# Patient Record
Sex: MALE | Race: BLACK OR AFRICAN AMERICAN | NOT HISPANIC OR LATINO | URBAN - METROPOLITAN AREA
[De-identification: names, ages, dates, MRNs, and addresses within clinical notes are randomized per-mention and may not be internally consistent; named-entity substitution may affect disease eponyms.]

---

## 2024-10-29 ENCOUNTER — HOSPITAL ENCOUNTER (INPATIENT)
Facility: HOSPITAL | Age: 69
LOS: 17 days | Discharge: NURSING, REHAB OR LONG-TERM CARE | DRG: 308 | End: 2024-11-15
Attending: SPECIALIST | Admitting: SPECIALIST
Payer: MEDICARE

## 2024-10-29 DIAGNOSIS — I48.91 ATRIAL FIBRILLATION WITH RVR: Primary | ICD-10-CM

## 2024-10-29 DIAGNOSIS — N17.9 AKI (ACUTE KIDNEY INJURY): ICD-10-CM

## 2024-10-29 DIAGNOSIS — I48.91 A-FIB: ICD-10-CM

## 2024-10-29 DIAGNOSIS — I73.9 PAD (PERIPHERAL ARTERY DISEASE): ICD-10-CM

## 2024-10-29 DIAGNOSIS — I50.20 HFREF (HEART FAILURE WITH REDUCED EJECTION FRACTION): ICD-10-CM

## 2024-10-29 DIAGNOSIS — E87.5 HYPERKALEMIA: ICD-10-CM

## 2024-10-29 DIAGNOSIS — R00.1 BRADYCARDIA: ICD-10-CM

## 2024-10-29 PROBLEM — I95.9 HYPOTENSION: Status: ACTIVE | Noted: 2024-10-29

## 2024-10-29 PROBLEM — J43.1 PANLOBULAR EMPHYSEMA: Status: ACTIVE | Noted: 2024-10-29

## 2024-10-29 LAB
ANION GAP SERPL CALC-SCNC: 14 MMOL/L (ref 8–16)
BASOPHILS # BLD AUTO: 0.02 K/UL (ref 0–0.2)
BASOPHILS NFR BLD: 0.2 % (ref 0–1.9)
BUN SERPL-MCNC: 151 MG/DL (ref 8–23)
CALCIUM SERPL-MCNC: 10.5 MG/DL (ref 8.7–10.5)
CHLORIDE SERPL-SCNC: 100 MMOL/L (ref 95–110)
CO2 SERPL-SCNC: 22 MMOL/L (ref 23–29)
CREAT SERPL-MCNC: 5.6 MG/DL (ref 0.5–1.4)
DIFFERENTIAL METHOD BLD: ABNORMAL
EOSINOPHIL # BLD AUTO: 0.1 K/UL (ref 0–0.5)
EOSINOPHIL NFR BLD: 0.6 % (ref 0–8)
ERYTHROCYTE [DISTWIDTH] IN BLOOD BY AUTOMATED COUNT: 13.6 % (ref 11.5–14.5)
EST. GFR  (NO RACE VARIABLE): 10 ML/MIN/1.73 M^2
GLUCOSE SERPL-MCNC: 118 MG/DL (ref 70–110)
HCT VFR BLD AUTO: 40.8 % (ref 40–54)
HGB BLD-MCNC: 13.8 G/DL (ref 14–18)
IMM GRANULOCYTES # BLD AUTO: 0.03 K/UL (ref 0–0.04)
IMM GRANULOCYTES NFR BLD AUTO: 0.3 % (ref 0–0.5)
LACTATE SERPL-SCNC: 1 MMOL/L (ref 0.5–2.2)
LYMPHOCYTES # BLD AUTO: 0.9 K/UL (ref 1–4.8)
LYMPHOCYTES NFR BLD: 9.6 % (ref 18–48)
MAGNESIUM SERPL-MCNC: 2.2 MG/DL (ref 1.6–2.6)
MCH RBC QN AUTO: 28 PG (ref 27–31)
MCHC RBC AUTO-ENTMCNC: 33.8 G/DL (ref 32–36)
MCV RBC AUTO: 83 FL (ref 82–98)
MONOCYTES # BLD AUTO: 0.5 K/UL (ref 0.3–1)
MONOCYTES NFR BLD: 5.9 % (ref 4–15)
NEUTROPHILS # BLD AUTO: 7.4 K/UL (ref 1.8–7.7)
NEUTROPHILS NFR BLD: 83.4 % (ref 38–73)
NRBC BLD-RTO: 0 /100 WBC
PHOSPHATE SERPL-MCNC: 5.3 MG/DL (ref 2.7–4.5)
PLATELET # BLD AUTO: 173 K/UL (ref 150–450)
PMV BLD AUTO: 9.3 FL (ref 9.2–12.9)
POCT GLUCOSE: 146 MG/DL (ref 70–110)
POTASSIUM SERPL-SCNC: 6 MMOL/L (ref 3.5–5.1)
RBC # BLD AUTO: 4.92 M/UL (ref 4.6–6.2)
SODIUM SERPL-SCNC: 136 MMOL/L (ref 136–145)
TROPONIN I SERPL DL<=0.01 NG/ML-MCNC: 0.04 NG/ML (ref 0–0.03)
WBC # BLD AUTO: 8.86 K/UL (ref 3.9–12.7)

## 2024-10-29 PROCEDURE — 84484 ASSAY OF TROPONIN QUANT: CPT | Performed by: NURSE PRACTITIONER

## 2024-10-29 PROCEDURE — 80048 BASIC METABOLIC PNL TOTAL CA: CPT | Performed by: NURSE PRACTITIONER

## 2024-10-29 PROCEDURE — 83605 ASSAY OF LACTIC ACID: CPT | Performed by: NURSE PRACTITIONER

## 2024-10-29 PROCEDURE — 93010 ELECTROCARDIOGRAM REPORT: CPT | Mod: ,,, | Performed by: INTERNAL MEDICINE

## 2024-10-29 PROCEDURE — 85025 COMPLETE CBC W/AUTO DIFF WBC: CPT | Performed by: NURSE PRACTITIONER

## 2024-10-29 PROCEDURE — 84100 ASSAY OF PHOSPHORUS: CPT | Performed by: NURSE PRACTITIONER

## 2024-10-29 PROCEDURE — 63600175 PHARM REV CODE 636 W HCPCS: Performed by: NURSE PRACTITIONER

## 2024-10-29 PROCEDURE — 85730 THROMBOPLASTIN TIME PARTIAL: CPT | Performed by: NURSE PRACTITIONER

## 2024-10-29 PROCEDURE — 20000000 HC ICU ROOM

## 2024-10-29 PROCEDURE — 83735 ASSAY OF MAGNESIUM: CPT | Performed by: NURSE PRACTITIONER

## 2024-10-29 PROCEDURE — 85610 PROTHROMBIN TIME: CPT | Performed by: NURSE PRACTITIONER

## 2024-10-29 PROCEDURE — 25000003 PHARM REV CODE 250: Performed by: NURSE PRACTITIONER

## 2024-10-29 PROCEDURE — 93005 ELECTROCARDIOGRAM TRACING: CPT

## 2024-10-29 RX ORDER — HEPARIN SODIUM,PORCINE/D5W 25000/250
0-40 INTRAVENOUS SOLUTION INTRAVENOUS CONTINUOUS
Status: DISCONTINUED | OUTPATIENT
Start: 2024-10-29 | End: 2024-10-31

## 2024-10-29 RX ORDER — INDOMETHACIN 25 MG/1
50 CAPSULE ORAL ONCE
Status: COMPLETED | OUTPATIENT
Start: 2024-10-29 | End: 2024-10-29

## 2024-10-29 RX ORDER — ACETAMINOPHEN 325 MG/1
650 TABLET ORAL EVERY 6 HOURS PRN
Status: DISCONTINUED | OUTPATIENT
Start: 2024-10-29 | End: 2024-11-15 | Stop reason: HOSPADM

## 2024-10-29 RX ORDER — MUPIROCIN 20 MG/G
OINTMENT TOPICAL 2 TIMES DAILY
Status: COMPLETED | OUTPATIENT
Start: 2024-10-29 | End: 2024-11-03

## 2024-10-29 RX ORDER — PHENYLEPHRINE HCL IN 0.9% NACL 20MG/250ML
0-5 PLASTIC BAG, INJECTION (ML) INTRAVENOUS CONTINUOUS
Status: DISCONTINUED | OUTPATIENT
Start: 2024-10-29 | End: 2024-10-31

## 2024-10-29 RX ORDER — BUDESONIDE 0.5 MG/2ML
0.5 INHALANT ORAL EVERY 12 HOURS
Status: DISCONTINUED | OUTPATIENT
Start: 2024-10-30 | End: 2024-11-15 | Stop reason: HOSPADM

## 2024-10-29 RX ORDER — ARFORMOTEROL TARTRATE 15 UG/2ML
15 SOLUTION RESPIRATORY (INHALATION) 2 TIMES DAILY
Status: DISCONTINUED | OUTPATIENT
Start: 2024-10-30 | End: 2024-11-15 | Stop reason: HOSPADM

## 2024-10-29 RX ORDER — ONDANSETRON HYDROCHLORIDE 2 MG/ML
8 INJECTION, SOLUTION INTRAVENOUS EVERY 6 HOURS PRN
Status: DISCONTINUED | OUTPATIENT
Start: 2024-10-29 | End: 2024-11-15 | Stop reason: HOSPADM

## 2024-10-29 RX ORDER — FAMOTIDINE 20 MG/1
20 TABLET, FILM COATED ORAL DAILY
Status: DISCONTINUED | OUTPATIENT
Start: 2024-10-30 | End: 2024-11-06

## 2024-10-29 RX ADMIN — SODIUM BICARBONATE 50 MEQ: 84 INJECTION, SOLUTION INTRAVENOUS at 11:10

## 2024-10-29 RX ADMIN — AMIODARONE HYDROCHLORIDE 150 MG: 1.5 INJECTION, SOLUTION INTRAVENOUS at 10:10

## 2024-10-29 RX ADMIN — HEPARIN SODIUM 12 UNITS/KG/HR: 10000 INJECTION, SOLUTION INTRAVENOUS at 11:10

## 2024-10-29 RX ADMIN — MUPIROCIN: 20 OINTMENT TOPICAL at 11:10

## 2024-10-29 RX ADMIN — SODIUM BICARBONATE: 84 INJECTION, SOLUTION INTRAVENOUS at 11:10

## 2024-10-29 RX ADMIN — SODIUM ZIRCONIUM CYCLOSILICATE 10 G: 5 POWDER, FOR SUSPENSION ORAL at 11:10

## 2024-10-29 RX ADMIN — DEXTROSE MONOHYDRATE 250 ML: 100 INJECTION, SOLUTION INTRAVENOUS at 11:10

## 2024-10-29 RX ADMIN — AMIODARONE HYDROCHLORIDE 1 MG/MIN: 1.8 INJECTION, SOLUTION INTRAVENOUS at 10:10

## 2024-10-29 NOTE — PROVIDER TRANSFER
(Physician in Lead of Transfers)  Outside Transfer Acceptance Note / [facility]    Upon patient arrival, please contact Critical Care Medicine on call.    Referring facility: [facility]   Referring provider: RIDGE FUENTES JR  Accepting facility: [facility]  Accepting provider: BRAEDEN ECHOLS  Admitting provider: IMELDA WRIGHT  Reason for transfer: Higher Level of Care   Transfer diagnosis: CARINA/Atrial fibrillation with RVR  Transfer specialty requested: Critical Care Medicine  Transfer specialty notified: Yes  Transfer level: NUMBER 1-5: 1  Bed type requested: ICU  Isolation status: No active isolations   Admission class or status: IP- Inpatient      Narrative     68-year-old male with a history of atrial fibrillation, coronary artery disease with stenting, hypertension, stroke with right-sided deficits, COPD, HCV, left peroneal DVT, Xarelto use, and lower extremity cellulitis presented to [facility] Emergency Department on October 29 with weakness that began on the morning of presentation.  By report he had been feeling well the day prior but did not feel well on October 29.  In the emergency department he had AFib with RVR and hypotension, but oxygenation was stable on room air.  Patient was drowsy but would wake and speak without difficulty.  He reported no chest pain or shortness of breath.  Labs were concerning for CARINA with  and creatinine 5.38.  Potassium was 7.  High sensitivity troponin was in the normal range at 40.  Chest x-ray had clear lungs but did show marked cardiomegaly.  ED attempted cardioversion with the AFib with RVR, but the patient did not cardiovert.  By report, EKGs did not show evidence of significant peak T-waves. He received calcium, dextrose/insulin, lactated Ringer's, IV metoprolol, IV bicarbonate, and Lokelma.  Levophed infusion was initiated.  Blood pressure improved with the Levophed, but tachycardia  persisted.  Patient has a PICC line in place.  He also had a Wilcox catheter placed and had a small amount of urine with catheter placement.  Case discussed with the ED doctor and with Critical Care Medicine at [facility].  ED will initiate bicarbonate infusion prior to transfer.  They will try to wean him off the Levophed if his blood pressure will tolerate.  ED felt patient was stable for transfer at present.  Will plan transfer to Critical Care Medicine at [facility] for further treatment of CARINA with hyperkalemia and atrial fibrillation with rapid ventricular response.    Lactic acid 1.9, sodium 132, potassium 7, chloride 101, CO2 21, , creatinine 5.38, glucose 120, calcium 10.5, albumin 4.5, AST 22, ALT 15, white blood cells 9, hemoglobin 14.5, hematocrit 44.8, platelets 203, digoxin level 0.1, magnesium 2.5, high sensitivity troponin 40 (reference range less than 45),   -urinalysis had 15 protein, 0-5 WBC, 0-1 RBC, 3-5 epithelial cell, moderate bacteria    Chest x-ray showed marked cardiomegaly.  Mediastinum normal.  Lungs are clear with no pleural effusion or pneumothorax.  Impression was prominent cardiomegaly with no other significant abnormality.    August 22: Sodium 141, potassium 4.4, chloride 111, CO2 25, BUN 15, creatinine 0.97, glucose 97    April 2024: RAUL showed EF 40-45%.  Moderate to severe RV dilation with reduced function.  Mild-to-moderate LA dilation intact interatrial septum.  No evidence of left atrial appendage thrombus.  Severe TR.      VS:  Temperature 97.6°, pulse 134, respirations 19, blood pressure 104/70, O2 sats 100%      Instructions    Admit to Critical Care Medicine      FABIANA Mckeon MD  [facility] Medicine Staff  Cell: [phone]

## 2024-10-30 LAB
ALBUMIN SERPL BCP-MCNC: 3.7 G/DL (ref 3.5–5.2)
ALP SERPL-CCNC: 48 U/L (ref 40–150)
ALT SERPL W/O P-5'-P-CCNC: 13 U/L (ref 10–44)
ANION GAP SERPL CALC-SCNC: 13 MMOL/L (ref 8–16)
ANION GAP SERPL CALC-SCNC: 13 MMOL/L (ref 8–16)
ANION GAP SERPL CALC-SCNC: 15 MMOL/L (ref 8–16)
AORTIC ROOT ANNULUS: 2.97 CM
APTT PPP: 111.2 SEC (ref 21–32)
APTT PPP: 29 SEC (ref 21–32)
APTT PPP: 40.6 SEC (ref 21–32)
APTT PPP: 46.7 SEC (ref 21–32)
ASCENDING AORTA: 2.85 CM
AST SERPL-CCNC: 20 U/L (ref 10–40)
AV INDEX (PROSTH): 0.88
AV MEAN GRADIENT: 3.5 MMHG
AV PEAK GRADIENT: 4.8 MMHG
AV VALVE AREA BY VELOCITY RATIO: 2.3 CM²
AV VALVE AREA: 2.8 CM²
AV VELOCITY RATIO: 0.73
BASOPHILS # BLD AUTO: 0.02 K/UL (ref 0–0.2)
BASOPHILS NFR BLD: 0.2 % (ref 0–1.9)
BILIRUB DIRECT SERPL-MCNC: 0.1 MG/DL (ref 0.1–0.3)
BILIRUB SERPL-MCNC: 0.3 MG/DL (ref 0.1–1)
BILIRUB UR QL STRIP: NEGATIVE
BSA FOR ECHO PROCEDURE: 2.01 M2
BUN SERPL-MCNC: 134 MG/DL (ref 8–23)
BUN SERPL-MCNC: 144 MG/DL (ref 8–23)
BUN SERPL-MCNC: 148 MG/DL (ref 8–23)
CALCIUM SERPL-MCNC: 9.7 MG/DL (ref 8.7–10.5)
CALCIUM SERPL-MCNC: 9.8 MG/DL (ref 8.7–10.5)
CALCIUM SERPL-MCNC: 9.9 MG/DL (ref 8.7–10.5)
CHLORIDE SERPL-SCNC: 95 MMOL/L (ref 95–110)
CHLORIDE SERPL-SCNC: 96 MMOL/L (ref 95–110)
CHLORIDE SERPL-SCNC: 97 MMOL/L (ref 95–110)
CLARITY UR: CLEAR
CO2 SERPL-SCNC: 28 MMOL/L (ref 23–29)
COLOR UR: COLORLESS
CREAT SERPL-MCNC: 4.7 MG/DL (ref 0.5–1.4)
CREAT SERPL-MCNC: 4.8 MG/DL (ref 0.5–1.4)
CREAT SERPL-MCNC: 5 MG/DL (ref 0.5–1.4)
CV ECHO LV RWT: 0.65 CM
DIFFERENTIAL METHOD BLD: ABNORMAL
DIGOXIN SERPL-MCNC: <0.1 NG/ML (ref 0.8–2)
DOP CALC AO PEAK VEL: 1.1 M/S
DOP CALC AO VTI: 14 CM
DOP CALC LVOT AREA: 3.1 CM2
DOP CALC LVOT DIAMETER: 2 CM
DOP CALC LVOT PEAK VEL: 0.8 M/S
DOP CALC LVOT STROKE VOLUME: 38.6 CM3
DOP CALC RVOT PEAK VEL: 0.67 M/S
DOP CALC RVOT VTI: 10.2 CM
DOP CALCLVOT PEAK VEL VTI: 12.3 CM
E WAVE DECELERATION TIME: 198.56 MSEC
E/A RATIO: 1.25
E/E' RATIO: 3.87 M/S
ECHO LV POSTERIOR WALL: 1.2 CM (ref 0.6–1.1)
EOSINOPHIL # BLD AUTO: 0.2 K/UL (ref 0–0.5)
EOSINOPHIL NFR BLD: 2.1 % (ref 0–8)
ERYTHROCYTE [DISTWIDTH] IN BLOOD BY AUTOMATED COUNT: 13.6 % (ref 11.5–14.5)
EST. GFR  (NO RACE VARIABLE): 12 ML/MIN/1.73 M^2
EST. GFR  (NO RACE VARIABLE): 13 ML/MIN/1.73 M^2
EST. GFR  (NO RACE VARIABLE): 13 ML/MIN/1.73 M^2
FRACTIONAL SHORTENING: 18.9 % (ref 28–44)
GLUCOSE SERPL-MCNC: 166 MG/DL (ref 70–110)
GLUCOSE SERPL-MCNC: 88 MG/DL (ref 70–110)
GLUCOSE SERPL-MCNC: 94 MG/DL (ref 70–110)
GLUCOSE UR QL STRIP: NEGATIVE
HCT VFR BLD AUTO: 40.8 % (ref 40–54)
HGB BLD-MCNC: 13.7 G/DL (ref 14–18)
HGB UR QL STRIP: ABNORMAL
IMM GRANULOCYTES # BLD AUTO: 0.02 K/UL (ref 0–0.04)
IMM GRANULOCYTES NFR BLD AUTO: 0.2 % (ref 0–0.5)
INR PPP: 1 (ref 0.8–1.2)
INTERVENTRICULAR SEPTUM: 1.2 CM (ref 0.6–1.1)
IVC DIAMETER: 2.85 CM
IVRT: 72.31 MSEC
KETONES UR QL STRIP: NEGATIVE
LA MAJOR: 5.77 CM
LA MINOR: 5.76 CM
LA WIDTH: 3.2 CM
LEFT ATRIUM SIZE: 4.26 CM
LEFT ATRIUM VOLUME INDEX: 33.6 ML/M2
LEFT ATRIUM VOLUME: 66.8 CM3
LEFT INTERNAL DIMENSION IN SYSTOLE: 3 CM (ref 2.1–4)
LEFT VENTRICLE DIASTOLIC VOLUME INDEX: 28.23 ML/M2
LEFT VENTRICLE DIASTOLIC VOLUME: 56.17 ML
LEFT VENTRICLE MASS INDEX: 74 G/M2
LEFT VENTRICLE SYSTOLIC VOLUME INDEX: 17.4 ML/M2
LEFT VENTRICLE SYSTOLIC VOLUME: 34.57 ML
LEFT VENTRICULAR INTERNAL DIMENSION IN DIASTOLE: 3.7 CM (ref 3.5–6)
LEFT VENTRICULAR MASS: 147.3 G
LEUKOCYTE ESTERASE UR QL STRIP: ABNORMAL
LV LATERAL E/E' RATIO: 4 M/S
LV SEPTAL E/E' RATIO: 3.75 M/S
LVED V (TEICH): 56.17 ML
LVES V (TEICH): 34.57 ML
LVOT MG: 1.7 MMHG
LVOT MV: 0.62 CM/S
LYMPHOCYTES # BLD AUTO: 1.3 K/UL (ref 1–4.8)
LYMPHOCYTES NFR BLD: 14.2 % (ref 18–48)
MAGNESIUM SERPL-MCNC: 2.1 MG/DL (ref 1.6–2.6)
MCH RBC QN AUTO: 27.7 PG (ref 27–31)
MCHC RBC AUTO-ENTMCNC: 33.6 G/DL (ref 32–36)
MCV RBC AUTO: 83 FL (ref 82–98)
MICROSCOPIC COMMENT: ABNORMAL
MONOCYTES # BLD AUTO: 0.7 K/UL (ref 0.3–1)
MONOCYTES NFR BLD: 7.8 % (ref 4–15)
MV PEAK A VEL: 0.48 M/S
MV PEAK E VEL: 0.6 M/S
MV STENOSIS PRESSURE HALF TIME: 57.58 MS
MV VALVE AREA P 1/2 METHOD: 3.82 CM2
NEUTROPHILS # BLD AUTO: 6.7 K/UL (ref 1.8–7.7)
NEUTROPHILS NFR BLD: 75.5 % (ref 38–73)
NITRITE UR QL STRIP: NEGATIVE
NRBC BLD-RTO: 0 /100 WBC
OSMOLALITY SERPL: 337 MOSM/KG (ref 280–300)
OSMOLALITY UR: 352 MOSM/KG (ref 50–1200)
PH UR STRIP: 7 [PH] (ref 5–8)
PHOSPHATE SERPL-MCNC: 6 MG/DL (ref 2.7–4.5)
PISA MRMAX VEL: 2.74 M/S
PISA TR MAX VEL: 3.31 M/S
PLATELET # BLD AUTO: 218 K/UL (ref 150–450)
PMV BLD AUTO: 9.9 FL (ref 9.2–12.9)
POCT GLUCOSE: 155 MG/DL (ref 70–110)
POCT GLUCOSE: 186 MG/DL (ref 70–110)
POTASSIUM SERPL-SCNC: 4.9 MMOL/L (ref 3.5–5.1)
POTASSIUM SERPL-SCNC: 5.1 MMOL/L (ref 3.5–5.1)
POTASSIUM SERPL-SCNC: 5.5 MMOL/L (ref 3.5–5.1)
PROT SERPL-MCNC: 7.1 G/DL (ref 6–8.4)
PROT UR QL STRIP: NEGATIVE
PROTHROMBIN TIME: 11.7 SEC (ref 9–12.5)
PV MEAN GRADIENT: 1 MMHG
RA MAJOR: 7.88 CM
RA PRESSURE ESTIMATED: 15 MMHG
RA WIDTH: 6.3 CM
RBC # BLD AUTO: 4.94 M/UL (ref 4.6–6.2)
RBC #/AREA URNS HPF: >100 /HPF (ref 0–4)
RIGHT VENTRICULAR END-DIASTOLIC DIMENSION: 4.42 CM
RV TB RVSP: 18 MMHG
SODIUM SERPL-SCNC: 137 MMOL/L (ref 136–145)
SODIUM SERPL-SCNC: 138 MMOL/L (ref 136–145)
SODIUM SERPL-SCNC: 138 MMOL/L (ref 136–145)
SODIUM UR-SCNC: 91 MMOL/L (ref 20–250)
SP GR UR STRIP: 1.01 (ref 1–1.03)
STJ: 3.08 CM
TDI LATERAL: 0.15 M/S
TDI SEPTAL: 0.16 M/S
TDI: 0.16 M/S
TR MAX PG: 44 MMHG
TRICUSPID ANNULAR PLANE SYSTOLIC EXCURSION: 1.66 CM
TV REST PULMONARY ARTERY PRESSURE: 59 MMHG
URN SPEC COLLECT METH UR: ABNORMAL
UROBILINOGEN UR STRIP-ACNC: NEGATIVE EU/DL
WBC # BLD AUTO: 8.87 K/UL (ref 3.9–12.7)
WBC #/AREA URNS HPF: 1 /HPF (ref 0–5)
Z-SCORE OF LEFT VENTRICULAR DIMENSION IN END DIASTOLE: -4.45
Z-SCORE OF LEFT VENTRICULAR DIMENSION IN END SYSTOLE: -1.32

## 2024-10-30 PROCEDURE — 80076 HEPATIC FUNCTION PANEL: CPT | Performed by: NURSE PRACTITIONER

## 2024-10-30 PROCEDURE — 25000242 PHARM REV CODE 250 ALT 637 W/ HCPCS: Performed by: NURSE PRACTITIONER

## 2024-10-30 PROCEDURE — 80162 ASSAY OF DIGOXIN TOTAL: CPT | Performed by: NURSE PRACTITIONER

## 2024-10-30 PROCEDURE — 85025 COMPLETE CBC W/AUTO DIFF WBC: CPT | Performed by: NURSE PRACTITIONER

## 2024-10-30 PROCEDURE — 87040 BLOOD CULTURE FOR BACTERIA: CPT | Performed by: NURSE PRACTITIONER

## 2024-10-30 PROCEDURE — 94640 AIRWAY INHALATION TREATMENT: CPT

## 2024-10-30 PROCEDURE — 25000003 PHARM REV CODE 250: Performed by: NURSE PRACTITIONER

## 2024-10-30 PROCEDURE — 94761 N-INVAS EAR/PLS OXIMETRY MLT: CPT

## 2024-10-30 PROCEDURE — 83935 ASSAY OF URINE OSMOLALITY: CPT | Performed by: NURSE PRACTITIONER

## 2024-10-30 PROCEDURE — 63600175 PHARM REV CODE 636 W HCPCS: Performed by: NURSE PRACTITIONER

## 2024-10-30 PROCEDURE — 84100 ASSAY OF PHOSPHORUS: CPT | Performed by: NURSE PRACTITIONER

## 2024-10-30 PROCEDURE — 84300 ASSAY OF URINE SODIUM: CPT | Performed by: NURSE PRACTITIONER

## 2024-10-30 PROCEDURE — 99232 SBSQ HOSP IP/OBS MODERATE 35: CPT | Mod: 25,,, | Performed by: INTERNAL MEDICINE

## 2024-10-30 PROCEDURE — 83930 ASSAY OF BLOOD OSMOLALITY: CPT | Performed by: NURSE PRACTITIONER

## 2024-10-30 PROCEDURE — 80048 BASIC METABOLIC PNL TOTAL CA: CPT | Performed by: NURSE PRACTITIONER

## 2024-10-30 PROCEDURE — 83735 ASSAY OF MAGNESIUM: CPT | Performed by: NURSE PRACTITIONER

## 2024-10-30 PROCEDURE — 85730 THROMBOPLASTIN TIME PARTIAL: CPT | Mod: 91 | Performed by: SPECIALIST

## 2024-10-30 PROCEDURE — 36415 COLL VENOUS BLD VENIPUNCTURE: CPT | Performed by: NURSE PRACTITIONER

## 2024-10-30 PROCEDURE — 81000 URINALYSIS NONAUTO W/SCOPE: CPT | Performed by: NURSE PRACTITIONER

## 2024-10-30 PROCEDURE — 51702 INSERT TEMP BLADDER CATH: CPT

## 2024-10-30 PROCEDURE — 20000000 HC ICU ROOM

## 2024-10-30 RX ORDER — SODIUM CHLORIDE, SODIUM LACTATE, POTASSIUM CHLORIDE, CALCIUM CHLORIDE 600; 310; 30; 20 MG/100ML; MG/100ML; MG/100ML; MG/100ML
INJECTION, SOLUTION INTRAVENOUS CONTINUOUS
Status: DISCONTINUED | OUTPATIENT
Start: 2024-10-30 | End: 2024-11-01

## 2024-10-30 RX ORDER — SODIUM CHLORIDE, SODIUM LACTATE, POTASSIUM CHLORIDE, CALCIUM CHLORIDE 600; 310; 30; 20 MG/100ML; MG/100ML; MG/100ML; MG/100ML
INJECTION, SOLUTION INTRAVENOUS CONTINUOUS
Status: DISCONTINUED | OUTPATIENT
Start: 2024-10-30 | End: 2024-10-30

## 2024-10-30 RX ADMIN — MUPIROCIN: 20 OINTMENT TOPICAL at 08:10

## 2024-10-30 RX ADMIN — MUPIROCIN: 20 OINTMENT TOPICAL at 09:10

## 2024-10-30 RX ADMIN — BUDESONIDE INHALATION 0.5 MG: 0.5 SUSPENSION RESPIRATORY (INHALATION) at 07:10

## 2024-10-30 RX ADMIN — AMIODARONE HYDROCHLORIDE 0.5 MG/MIN: 1.8 INJECTION, SOLUTION INTRAVENOUS at 03:10

## 2024-10-30 RX ADMIN — SODIUM CHLORIDE, POTASSIUM CHLORIDE, SODIUM LACTATE AND CALCIUM CHLORIDE 1000 ML: 600; 310; 30; 20 INJECTION, SOLUTION INTRAVENOUS at 07:10

## 2024-10-30 RX ADMIN — Medication 0.5 MCG/KG/MIN: at 01:10

## 2024-10-30 RX ADMIN — FAMOTIDINE 20 MG: 20 TABLET ORAL at 08:10

## 2024-10-30 RX ADMIN — SODIUM CHLORIDE, POTASSIUM CHLORIDE, SODIUM LACTATE AND CALCIUM CHLORIDE 500 ML: 600; 310; 30; 20 INJECTION, SOLUTION INTRAVENOUS at 11:10

## 2024-10-30 RX ADMIN — SODIUM CHLORIDE, POTASSIUM CHLORIDE, SODIUM LACTATE AND CALCIUM CHLORIDE: 600; 310; 30; 20 INJECTION, SOLUTION INTRAVENOUS at 09:10

## 2024-10-30 RX ADMIN — SODIUM ZIRCONIUM CYCLOSILICATE 10 G: 5 POWDER, FOR SUSPENSION ORAL at 08:10

## 2024-10-30 RX ADMIN — HUMAN INSULIN 8 UNITS: 100 INJECTION, SOLUTION SUBCUTANEOUS at 12:10

## 2024-10-30 RX ADMIN — ARFORMOTEROL TARTRATE 15 MCG: 15 SOLUTION RESPIRATORY (INHALATION) at 07:10

## 2024-10-30 RX ADMIN — SODIUM CHLORIDE, POTASSIUM CHLORIDE, SODIUM LACTATE AND CALCIUM CHLORIDE 125 ML/HR: 600; 310; 30; 20 INJECTION, SOLUTION INTRAVENOUS at 01:10

## 2024-10-30 RX ADMIN — SODIUM CHLORIDE, POTASSIUM CHLORIDE, SODIUM LACTATE AND CALCIUM CHLORIDE 500 ML: 600; 310; 30; 20 INJECTION, SOLUTION INTRAVENOUS at 06:10

## 2024-10-30 RX ADMIN — Medication 0.5 MCG/KG/MIN: at 11:10

## 2024-10-30 RX ADMIN — SODIUM CHLORIDE, POTASSIUM CHLORIDE, SODIUM LACTATE AND CALCIUM CHLORIDE: 600; 310; 30; 20 INJECTION, SOLUTION INTRAVENOUS at 11:10

## 2024-10-30 RX ADMIN — SODIUM CHLORIDE, POTASSIUM CHLORIDE, SODIUM LACTATE AND CALCIUM CHLORIDE: 600; 310; 30; 20 INJECTION, SOLUTION INTRAVENOUS at 03:10

## 2024-10-30 RX ADMIN — SODIUM ZIRCONIUM CYCLOSILICATE 10 G: 5 POWDER, FOR SUSPENSION ORAL at 03:10

## 2024-10-30 RX ADMIN — SODIUM ZIRCONIUM CYCLOSILICATE 10 G: 5 POWDER, FOR SUSPENSION ORAL at 09:10

## 2024-10-30 RX ADMIN — ACETAMINOPHEN 650 MG: 325 TABLET ORAL at 12:10

## 2024-10-30 NOTE — CONSULTS
[facility] - Intensive Care ([facility])  Wound Care    Patient Name:  Layton Abrams   MRN:  [id number]  Date: 10/30/2024  Diagnosis: Atrial fibrillation with RVR    History:     Past Medical History:   Diagnosis Date    Arthritis     CHF (congestive heart failure)     COPD (chronic obstructive pulmonary disease)     Hypertension     Liver disease     Stroke        Social History     Socioeconomic History    Marital status: Unknown   Tobacco Use    Smoking status: Former     Current packs/day: 0.50     Types: Cigarettes    Smokeless tobacco: Never    Tobacco comments:     Quit 2010   Substance and Sexual Activity    Alcohol use: Not Currently    Drug use: Not Currently     Social Drivers of Health     Financial Resource Strain: Low Risk  (10/29/2024)    Overall Financial Resource Strain (CARDIA)     Difficulty of Paying Living Expenses: Not very hard   Food Insecurity: No Food Insecurity (10/29/2024)    Hunger Vital Sign     Worried About Running Out of Food in the Last Year: Never true     Ran Out of Food in the Last Year: Never true   Transportation Needs: No Transportation Needs (10/29/2024)    TRANSPORTATION NEEDS     Transportation : No   Stress: No Stress Concern Present (10/29/2024)    Citizen of [place] [employer] - Occupational Stress Questionnaire     Feeling of Stress : Only a little   Housing Stability: Low Risk  (10/29/2024)    Housing Stability Vital Sign     Unable to Pay for Housing in the Last Year: No     Homeless in the Last Year: No       Precautions:     Allergies as of 10/29/2024    (No Known Allergies)       WO Assessment Details/Treatment     Consulted on Layton Abrams due to Present on admission altered skin integrity to Left heel, lumbar spine, and bilateral groin. Patient admitted on 10/29/2024 due to a-fib with RVR, PMH significant for HTN; CAD; HF; COPD; atrial fib plus DVT earlier this year on xarelto; HCV; prior CVA x 5   Skin assessed.   DTI noted to Left medial heel measuring  4x7 maroon/purple dry intact skin. Ulceration noted to left lateral heel measuring 1x0.3x0.3 wound bed is moist pink/red.Scant amount of serosanguinous drainage noted, skin around wound boggy upon palpation. Both areas cleansed with saline, patted dry painted with cavilon. Foam heel dressing applied to cover both areas. Right heel noted to have blanchable redness, heel foam replaced.   Left lateral aspect of lower leg noted to have an ulceration measuring 2x1x0.1 that may have been venous in origin, unsure of exact etiology. Wound bed pink/red moist with small amount of yellow adipose tissue visible. Scant amount of serosanguinous drainage noted. Cleansed with saline, patted dry periwound painted with cavilon and foam dressing applied.   Lumbar spine noted to have abscess measuring 3x3x0.1, appears as area of discolored maroon purple intact skin with small opening in the middle containing a small amount of serosanguineous drainage when probed. Skin around appears to have some redness but no warmth noted at this time. Cleansed with saline, patted dry painted periwound with cavilon and applied foam dressing. Recommend changing all wounds with foam dressings twice weekly on Tue/Fri and PRN for excess drainage. Recommend primary nurse notify physician if abscess on the lumbar spine appears more inflamed with excess drainage or develops warmth upon palpation.   Sacrum noted to have pink healed scar tissue but no current open wounds at this time.   Moisture associated breakdown noted to the bilateral groin. Partial thickness tissue loss that is pink/red and moist, left side is malodorous. Cleansed with bath wipe. Vashe wound cleanser compress applied to left side and allowed to dwell. Patted dry and applied interdry strip. Cleanse area daily with baths and PRN for incontinence and replace interdry strip. Recommend replacing with new strip every 5 days or PRN if soiled with urine, stool, or drainage.       Recommend  Pressure injury prevention interventions to include: turn q2 hours, float heels, moisture management. Wound care per orders.    Will f/u with patient later this week.                  10/30/24 1040   WOCN Assessment   WOCN Total Time (mins) 60   Visit Date 10/30/24   Visit Time 1040   Consult Type New   WOCN Speciality Wound   Wound deep tissue injury;moisture;At risk for pressure Injury   Number of Wounds 5   Intervention assessed;changed;applied;chart review;orders   Teaching on-going   Pressure Injury Prevention    Check Moisture Management Pad Done   Sacral Foam Dressing Peel back sacral foam dressing, assess skin and reapply   Heel protection technique Heel boot   Heel preventative measures Peel back dressing/boot, assess skin and reapply   Check Medical Devices Done        Wound 10/29/24 2204 Ulceration Left lateral Heel   Date First Assessed/Time First Assessed: 10/29/24 2204   Present on Original Admission: Yes  Primary Wound Type: Ulceration  Side: Left  Orientation: lateral  Location: Heel  Is this injury device related?: No   Wound Image    Dressing Appearance Moist drainage   Drainage Amount Scant   Drainage Characteristics/Odor Serosanguineous   Appearance Pink;Red;White;Yellow;Moist   Tissue loss description Full thickness   Red (%), Wound Tissue Color 60 %   Yellow (%), Wound Tissue Color 40 %   Periwound Area Dry;Redness  (boggy)   Wound Edges Callused   Wound Length (cm) 1 cm   Wound Width (cm) 0.3 cm   Wound Depth (cm) 0.3 cm   Wound Volume (cm^3) 0.09 cm^3   Wound Surface Area (cm^2) 0.3 cm^2   Care Cleansed with:;Wound cleanser   Dressing Applied;Foam   Periwound Care Skin barrier film applied   Dressing Change Due 11/01/24        Wound 10/29/24 2206 Moisture associated dermatitis Right anterior Groin   Date First Assessed/Time First Assessed: 10/29/24 2206   Present on Original Admission: Yes  Primary Wound Type: Moisture associated dermatitis  Side: (c) Right  Orientation: anterior  Location:  Groin  Is this injury device related?: No   Wound Image    Dressing Appearance Moist drainage   Drainage Amount Scant   Drainage Characteristics/Odor Malodorous;Serosanguineous   Appearance Pink;Red;Moist   Tissue loss description Partial thickness   Periwound Area Moist;Redness   Care Cleansed with:;Wound cleanser   Dressing Applied;Other (comment)  (interdy)   Dressing Change Due 10/31/24        Wound 10/29/24 2208 Moisture associated dermatitis Left anterior Groin   Date First Assessed/Time First Assessed: 10/29/24 2208   Present on Original Admission: Yes  Primary Wound Type: Moisture associated dermatitis  Side: Left  Orientation: anterior  Location: Groin  Is this injury device related?: No   Dressing Appearance Open to air   Drainage Amount None   Appearance Pink;Red;Moist   Tissue loss description Partial thickness   Periwound Area Moist   Care Cleansed with:;Wound cleanser        Wound 10/30/24 1000 Pressure Injury Left medial Heel   Date First Assessed/Time First Assessed: 10/30/24 1000   Present on Original Admission: Yes  Primary Wound Type: Pressure Injury  Side: Left  Orientation: medial  Location: Heel   Wound Image    Pressure Injury Stage DTPI   Dressing Appearance Open to air   Drainage Amount None   Appearance Maroon;Purple;Dry   Periwound Area Dry   Wound Length (cm) 4 cm   Wound Width (cm) 7 cm   Wound Surface Area (cm^2) 28 cm^2   Care Cleansed with:;Wound cleanser;Applied:;Skin Barrier   Dressing Applied;Foam   Dressing Change Due 11/01/24        Wound 10/30/24 1000 Abscess Lumbar spine   Date First Assessed/Time First Assessed: 10/30/24 1000   Present on Original Admission: Yes  Primary Wound Type: Abscess  Location: Lumbar spine   Wound Image    Dressing Appearance Moist drainage   Drainage Amount Small   Drainage Characteristics/Odor Serosanguineous   Appearance Red;Maroon;Black   Periwound Area Dry;Redness   Wound Length (cm) 3 cm   Wound Width (cm) 3 cm   Wound Depth (cm) 0.1 cm   Wound  Volume (cm^3) 0.9 cm^3   Wound Surface Area (cm^2) 9 cm^2   Care Cleansed with:;Wound cleanser;Applied:;Skin Barrier   Dressing Applied;Foam   Dressing Change Due 11/01/24        Wound 10/30/24 1000 Ulceration Left lateral;lower Leg   Date First Assessed/Time First Assessed: 10/30/24 1000   Present on Original Admission: Yes  Primary Wound Type: Ulceration  Side: Left  Orientation: lateral;lower  Location: Leg   Wound Image    Dressing Appearance Moist drainage   Drainage Amount Small   Drainage Characteristics/Odor Serosanguineous   Appearance Pink;Red;Yellow;Moist   Tissue loss description Full thickness   Red (%), Wound Tissue Color 90 %   Yellow (%), Wound Tissue Color 10 %   Periwound Area Dry   Wound Edges Callused   Wound Length (cm) 2 cm   Wound Width (cm) 1 cm   Wound Depth (cm) 0.1 cm   Wound Volume (cm^3) 0.2 cm^3   Wound Surface Area (cm^2) 2 cm^2   Care Cleansed with:;Wound cleanser;Applied:;Skin Barrier   Dressing Applied;Foam   Dressing Change Due 11/01/24       10/30/2024

## 2024-10-30 NOTE — ASSESSMENT & PLAN NOTE
Patient has long standing persistent (>12 months) atrial fibrillation. Patient is currently in atrial fibrillation. [id number] Score: 1. The patients heart rate in the last 24 hours is as follows:  Pulse  Min: 59  Max: 145     Antiarrhythmics  amiodarone 360 mg/200 mL (1.8 mg/mL) infusion, Continuous, Intravenous    Anticoagulants  heparin 25,000 units in dextrose 5% 250 mL (100 units/mL) infusion LOW INTENSITY nomogram - [facility], Continuous, Intravenous  heparin 25,000 units in dextrose 5% (100 units/ml) IV bolus from bag LOW INTENSITY nomogram - [facility], As needed (PRN), Intravenous  heparin 25,000 units in dextrose 5% (100 units/ml) IV bolus from bag LOW INTENSITY nomogram - [facility], As needed (PRN), Intravenous    Plan  - Replete lytes with a goal of K>4, Mg >2  - Patient is anticoagulated, home xarelto. Awaiting follow up labs to resume xarelto vs heparin infusion  - Patient's afib is currently uncontrolled. Will add amiodarone infusion - Continue  - hold home metoprolol given hypotension  - Cards consulted  - Heparin gtt for AC  - Cardiac monitoring

## 2024-10-30 NOTE — HPI
"68 year old male with known medical issues including HTN; CAD; HFrEF (4/2024 30-40%EF with ungraded diastolic dysfunction and "giant" rt heart; COPD; atrial fib plus DVT earlier this year on xarelto; HCV; prior CVA x 5    Presented to OSH ED on 10/29 with one day history of weakness  Eval revealed atrial fib with RVR; hypotension; CARINA with BUN/Cr 132/5.38 (baseline 0.97) and potassium 7  Treatment included attempted cardioversion (unsuccessful); calcium, dextrose/insulin, lactated Ringer's, IV metoprolol, IV bicarbonate, and Lokelma. Levophed infusion was initiated. PICC line and bustillo catheter were placed    [facility] was contacted for transfer for CARINA/hyperkalemia with potential need nephrology services along with atrial fib with RVR and hypotension    Seen and examined on arrival to [facility] ICU  AAOx3. Atrial fib on monitor with ventricular rate 130-140. Marginal BP with MAP 68  Complains only of intermittent severe RLE "cramping"  STAT follow up labs along with bicarb and amiodarone infusions ordered  "

## 2024-10-30 NOTE — PLAN OF CARE
Pt AAO to self. Afib on monitor rate controlled. Weaned Bashir gtt for MAP > 65. Heparin gtt continued per nomogram. LR gtt continued 125 mL/hr. Amio gtt continued @ 0.5 / hr. Wilcox exchanged. U/A sent to lab. Blood cultures obtained. Safety precautions maintained. Bed alarm in use. Call light within reach.

## 2024-10-30 NOTE — PLAN OF CARE
Patient remains stable for duration of shift. VSS, a fib on monitor. Oriented to self, reorientation provided. RA. Voids with bustillo catheter; output of 3.1 L since admission. Altered skin integrity observed; wound care consult ordered. POC reviewed with patient, demonstrated understanding. Reinforcement needed. Currently resting in bed with safety & fall prevention measures in place. Call light in reach. Care ongoing.

## 2024-10-30 NOTE — ASSESSMENT & PLAN NOTE
CARINA is likely due to acute tubular necrosis caused by ischemia related to hypotension . Baseline creatinine is  0.97 . Most recent creatinine and eGFR are listed below.    OSH reported BUN/Cr 132/5.38 on presentation     Plan  - CARINA is  follow up result pending  - Avoid nephrotoxins and renally dose meds for GFR listed above  - Monitor urine output, serial BMP, and adjust therapy as needed  - hydrate overnight with bicarb infusion  - low threshold for nephrology consult pending follow up labs/electrolytes

## 2024-10-30 NOTE — NURSING TRANSFER
Nursing Transfer Note      10/30/2024   12:00 AM    Nurse giving handoff: LUI Martinez  Nurse receiving handoff: LUI Munoz    Reason patient is being transferred:  Higher level of care    Transfer To: [facility] ICU  Transfer From: [facility]    Transfer via stretcher    Transfer with cardiac monitoring, continuous pulse ox monitoring; bustillo catheter with 675 mL clear yellow urine (per [facility], bustillo was emptied prior to transport; obtained 1 L of urine)    Transported by [facility]    Transfer Vital Signs:  Blood Pressure: 81/62 (MAP: 67)  Heart Rate: 144  O2: 93%  Temperature: 98.3  Respirations: 20    Telemetry: Telemetry  [facility] Portable Monitor  Order for Tele Monitor? Yes    Additional Lines: Bustillo Catheter    Medicines sent: Levophed at 0.08 (d/c'd upon admission per NP); sodium bicarbonate with 1000 mL left in bag    Any special needs or follow-up needed: n/a    Patient belongings transferred with patient:  only a dark metal bracelet, currently wearing    Chart send with patient: Yes    Notified: father    Patient reassessed at: 10/29 2200 (date, time)    Upon arrival to floor: cardiac monitor applied, patient oriented to room, call bell in reach, bed in lowest position, labs drawn and medications given

## 2024-10-30 NOTE — HPI
"Presented to OSH ED on 10/29 with one day history of weakness  Eval revealed atrial fib with RVR; hypotension; CARINA with BUN/Cr 132/5.38 (baseline 0.97) and potassium 7  Treatment included attempted cardioversion (unsuccessful); calcium, dextrose/insulin, lactated Ringer's, IV metoprolol, IV bicarbonate, and Lokelma. Levophed infusion was initiated. PICC line and bustillo catheter were placed     [facility] was contacted for transfer for CARINA/hyperkalemia with potential need nephrology services along with atrial fib with RVR and hypotension     Seen and examined on arrival to [facility] ICU  AAOx3. Atrial fib on monitor with ventricular rate 130-140. Marginal BP with MAP 68  Complains only of intermittent severe RLE "cramping"  STAT follow up labs along with bicarb and amiodarone infusions ordered     Cardiology consult for afib with RVR.  PMH chronic afib, HTN; CAD; HFrEF (4/2024 30-40%EF with ungraded diastolic dysfunction and "giant" rt heart; COPD; h/o DVT earlier this year on xarelto; HCV; prior CVA x 5. Prior cardiologist Dr. Cowan.  Admitted for ARF low BP and afib with RVR. Baseline Cr 0.8 and was 5 and improved to 4.7 after IVF  Multiple ulcers and abscess identified by wound care team  Now AFIB VR controlled, BP at 90/60 with nuke gtt   No active bleeding. On heparin gtt.   EKG AFIB RBBB  "

## 2024-10-30 NOTE — ASSESSMENT & PLAN NOTE
Hyperkalemia is likely due to CARINA.The patients most recent potassium results are listed below.  OSH reported 7.0 potassium at ED presentation    Plan  - s/p treatment with lokelma, bicarb, calcium, dextrose/insulin  - Monitor for arrhythmias with EKG and/or continuous telemetry.   - Monitor potassium: follow up pending now, then serial monitoring pending response to initial therapy

## 2024-10-30 NOTE — SUBJECTIVE & OBJECTIVE
Past Medical History:   Diagnosis Date    Arthritis     CHF (congestive heart failure)     COPD (chronic obstructive pulmonary disease)     Hypertension     Liver disease     Stroke        No past surgical history on file.    Review of patient's allergies indicates:  No Known Allergies    Family History    None       Tobacco Use    Smoking status: Former     Current packs/day: 0.50     Types: Cigarettes    Smokeless tobacco: Never    Tobacco comments:     Quit 2010   Substance and Sexual Activity    Alcohol use: Not Currently    Drug use: Not Currently    Sexual activity: Not on file         Review of Systems   Constitutional:  Negative for appetite change and fever.   Respiratory:  Negative for chest tightness and shortness of breath.    Cardiovascular:  Positive for leg swelling. Negative for chest pain.   Gastrointestinal: Negative.    Genitourinary:  Negative for decreased urine volume, difficulty urinating and dysuria.   Musculoskeletal:         LE cramping  Chronic LE swelling  Left hand arthralgia   Neurological:  Positive for weakness.   Psychiatric/Behavioral:  The patient is not nervous/anxious.      Objective:     Vital Signs (Most Recent):  Temp: 98.8 °F (37.1 °C) (10/29/24 2200)  Pulse: (!) 139 (10/29/24 2200)  Resp: (!) 28 (10/29/24 2200)  BP: (!) 88/60 (10/29/24 2200)  SpO2: (!) 93 % (10/29/24 2200) Vital Signs (24h Range):  Temp:  [97.6 °F (36.4 °C)-98.8 °F (37.1 °C)] 98.8 °F (37.1 °C)  Pulse:  [114-145] 139  Resp:  [19-28] 28  SpO2:  [93 %-97 %] 93 %  BP: ()/(53-62) 88/60     Weight: 84.7 kg (186 lb 11.7 oz)  Body mass index is 27.58 kg/m².    No intake or output data in the 24 hours ending 10/29/24 2222      amiodarone in dextrose 5%  1 mg/min Intravenous Continuous 33.3 mL/hr at 10/29/24 2220 1 mg/min at 10/29/24 2220    [START ON 10/30/2024] amiodarone in dextrose 5%  0.5 mg/min Intravenous Continuous        phenylephrine  0-5 mcg/kg/min Intravenous Continuous        sodium bicarbonate 150  mEq in D5W 1,150 mL infusion   Intravenous Continuous           Physical Exam  Vitals and nursing note reviewed.   Constitutional:       General: He is awake. He is not in acute distress.     Appearance: He is normal weight. He is ill-appearing. He is not toxic-appearing.   HENT:      Head: Atraumatic.   Eyes:      Conjunctiva/sclera: Conjunctivae normal.   Neck:      Vascular: No JVD.   Cardiovascular:      Rate and Rhythm: Tachycardia present. Rhythm irregularly irregular.      Pulses:           Radial pulses are 2+ on the right side and 2+ on the left side.        Dorsalis pedis pulses are 1+ on the right side and 1+ on the left side.      Comments: Chronic appearing edema with stasis skin changed to bilat LE  Pulmonary:      Effort: Pulmonary effort is normal.      Breath sounds: Normal breath sounds. No wheezing or rales.   Abdominal:      General: Bowel sounds are normal. There is no distension.      Palpations: Abdomen is soft.      Tenderness: There is no abdominal tenderness.   Skin:     General: Skin is warm and dry.      Capillary Refill: Capillary refill takes less than 2 seconds.   Neurological:      Mental Status: He is alert and oriented to person, place, and time.      GCS: GCS eye subscore is 4. GCS verbal subscore is 5. GCS motor subscore is 6.      Cranial Nerves: Cranial nerves 2-12 are intact.   Psychiatric:         Attention and Perception: Attention normal.         Mood and Affect: Mood normal.         Speech: Speech normal.         Behavior: Behavior normal. Behavior is cooperative.         Thought Content: Thought content normal.          Vents:       Lines/Drains/Airways       Peripherally Inserted Central Catheter Line  Duration             PICC Triple Lumen 10/29/24 1000 right basilic <1 day              Drain  Duration                  Urethral Catheter 10/29/24 1000 16 Fr. <1 day              Peripheral Intravenous Line  Duration                  Peripheral IV - Single Lumen 10/29/24  2210 <1 day                    Significant Labs:    OSH labs reviewed - Lactic acid 1.9, sodium 132, potassium 7, chloride 101, CO2 21, , creatinine 5.38, glucose 120, calcium 10.5, albumin 4.5, AST 22, ALT 15, white blood cells 9, hemoglobin 14.5, hematocrit 44.8, platelets 203, digoxin level 0.1, magnesium 2.5, high sensitivity troponin 40 (reference range less than 45),   -urinalysis had 15 protein, 0-5 WBC, 0-1 RBC, 3-5 epithelial cell, moderate bacteria     New labs ordered STAT    Significant Imaging:   I have reviewed all pertinent imaging results/findings within the past 24 hours.

## 2024-10-30 NOTE — PLAN OF CARE
[facility] (Hospital)  Initial Discharge Assessment       Primary Care Provider: Nellie Cadena MD    Admission Diagnosis: CARINA (acute kidney injury) [N17.9]    Admission Date: 10/29/2024  Expected Discharge Date:     Transition of Care Barriers: None    Payor: [employer] MANAGED MEDICARE / Plan: [employer] MEDICARE DUAL DSNP / Product Type: Medicare Advantage /     Extended Emergency Contact Information  Primary Emergency Contact: Bijan Abrams  Mobile Phone: [phone]  Relation: Father  Secondary Emergency Contact: Bijan Abrams   Mobile Phone: [phone]  Relation: Brother    Discharge Plan A: Home Health       No Pharmacies Listed    Initial Assessment (most recent)       Adult Discharge Assessment - 10/30/24 1433          Discharge Assessment    Assessment Type Discharge Planning Assessment     Confirmed/corrected address, phone number and insurance Yes     Confirmed Demographics Correct on Facesheet     Source of Information patient     Communicated WIL with patient/caregiver Date not available/Unable to determine     Reason For Admission A-fib w/ RVR     People in Home parent(s)     Facility Arrived From: home - [facility]     Do you expect to return to your current living situation? Yes     Do you have help at home or someone to help you manage your care at home? Yes     Who are your caregiver(s) and their phone number(s)? father and siblings     Prior to hospitilization cognitive status: Alert/Oriented     Current cognitive status: Alert/Oriented     Walking or Climbing Stairs Difficulty yes     Walking or Climbing Stairs ambulation difficulty, requires equipment     Mobility Management rollator     Dressing/Bathing Difficulty no     Home Accessibility wheelchair accessible     Home Layout Able to live on 1st floor     Equipment Currently Used at Home walker, rolling     Readmission within 30 days? No     Patient currently being followed by outpatient case management? No     Do you currently  have service(s) that help you manage your care at home? Yes     Name and Contact number of agency unsure of agency name     Is the pt/caregiver preference to resume services with current agency Yes     Do you take prescription medications? Yes     Do you have prescription coverage? Yes     Coverage MCR     Do you have any problems affording any of your prescribed medications? No     Is the patient taking medications as prescribed? yes     Who is going to help you get home at discharge? family     How do you get to doctors appointments? car, drives self     Are you on dialysis? No     Do you take coumadin? No     Discharge Plan A Home Health     DME Needed Upon Discharge  none     Discharge Plan discussed with: Patient     Transition of Care Barriers None                   Anticipated DC dispo: home health   Prior Level of Function: independent with ADLs, ambulates using a rollator   People in home:  father, Bijan     Comments:  CM met with patient and brother, Bijan, at bedside to introduce role and discuss discharge planning. Family will be help at home and can provide transport at time of discharge. Patient states he has home health but is unsure of the agency name. Confirmed demographics, insurance, and emergency contacts. CM discharge needs depends on hospital progress. CM will continue following to assist with other needs. Discharge plan has been determined by review of patient's clinical status, future medical and therapeutic needs, and coverage/benefits for post-acute care in coordination with multidisciplinary team members.

## 2024-10-30 NOTE — H&P
"  [facility] ([facility])  Critical Care Medicine  History & Physical    Patient Name: Layton Abrams  MRN: [id number]  Admission Date: 10/29/2024  Hospital Length of Stay: 0 days  Code Status: Full Code  Attending Physician: Joe Jennings MD   Primary Care Provider: No primary care provider on file.   Principal Problem: Atrial fibrillation with RVR    Subjective:     HPI:  68 year old male with known medical issues including HTN; CAD; HFrEF (4/2024 30-40%EF with ungraded diastolic dysfunction and "giant" rt heart; COPD; atrial fib plus DVT earlier this year on xarelto; HCV; prior CVA x 5    Presented to OSH ED on 10/29 with one day history of weakness  Eval revealed atrial fib with RVR; hypotension; CARINA with BUN/Cr 132/5.38 (baseline 0.97) and potassium 7  Treatment included attempted cardioversion (unsuccessful); calcium, dextrose/insulin, lactated Ringer's, IV metoprolol, IV bicarbonate, and Lokelma. Levophed infusion was initiated. PICC line and bustillo catheter were placed    [facility] was contacted for transfer for CARINA/hyperkalemia with potential need nephrology services along with atrial fib with RVR and hypotension    Seen and examined on arrival to [facility] ICU  AAOx3. Atrial fib on monitor with ventricular rate 130-140. Marginal BP with MAP 68  Complains only of intermittent severe RLE "cramping"  STAT follow up labs along with bicarb and amiodarone infusions ordered    Hospital/ICU Course:  No notes on file     Past Medical History:   Diagnosis Date    Arthritis     CHF (congestive heart failure)     COPD (chronic obstructive pulmonary disease)     Hypertension     Liver disease     Stroke        No past surgical history on file.    Review of patient's allergies indicates:  No Known Allergies    Family History    None       Tobacco Use    Smoking status: Former     Current packs/day: 0.50     Types: Cigarettes    Smokeless tobacco: Never    Tobacco comments:     Quit 2010   Substance and " Sexual Activity    Alcohol use: Not Currently    Drug use: Not Currently    Sexual activity: Not on file         Review of Systems   Constitutional:  Negative for appetite change and fever.   Respiratory:  Negative for chest tightness and shortness of breath.    Cardiovascular:  Positive for leg swelling. Negative for chest pain.   Gastrointestinal: Negative.    Genitourinary:  Negative for decreased urine volume, difficulty urinating and dysuria.   Musculoskeletal:         LE cramping  Chronic LE swelling  Left hand arthralgia   Neurological:  Positive for weakness.   Psychiatric/Behavioral:  The patient is not nervous/anxious.      Objective:     Vital Signs (Most Recent):  Temp: 98.8 °F (37.1 °C) (10/29/24 2200)  Pulse: (!) 139 (10/29/24 2200)  Resp: (!) 28 (10/29/24 2200)  BP: (!) 88/60 (10/29/24 2200)  SpO2: (!) 93 % (10/29/24 2200) Vital Signs (24h Range):  Temp:  [97.6 °F (36.4 °C)-98.8 °F (37.1 °C)] 98.8 °F (37.1 °C)  Pulse:  [114-145] 139  Resp:  [19-28] 28  SpO2:  [93 %-97 %] 93 %  BP: ()/(53-62) 88/60     Weight: 84.7 kg (186 lb 11.7 oz)  Body mass index is 27.58 kg/m².    No intake or output data in the 24 hours ending 10/29/24 2222      amiodarone in dextrose 5%  1 mg/min Intravenous Continuous 33.3 mL/hr at 10/29/24 2220 1 mg/min at 10/29/24 2220    [START ON 10/30/2024] amiodarone in dextrose 5%  0.5 mg/min Intravenous Continuous        phenylephrine  0-5 mcg/kg/min Intravenous Continuous        sodium bicarbonate 150 mEq in D5W 1,150 mL infusion   Intravenous Continuous           Physical Exam  Vitals and nursing note reviewed.   Constitutional:       General: He is awake. He is not in acute distress.     Appearance: He is normal weight. He is ill-appearing. He is not toxic-appearing.   HENT:      Head: Atraumatic.   Eyes:      Conjunctiva/sclera: Conjunctivae normal.   Neck:      Vascular: No JVD.   Cardiovascular:      Rate and Rhythm: Tachycardia present. Rhythm irregularly irregular.       Pulses:           Radial pulses are 2+ on the right side and 2+ on the left side.        Dorsalis pedis pulses are 1+ on the right side and 1+ on the left side.      Comments: Chronic appearing edema with stasis skin changed to bilat LE  Pulmonary:      Effort: Pulmonary effort is normal.      Breath sounds: Normal breath sounds. No wheezing or rales.   Abdominal:      General: Bowel sounds are normal. There is no distension.      Palpations: Abdomen is soft.      Tenderness: There is no abdominal tenderness.   Skin:     General: Skin is warm and dry.      Capillary Refill: Capillary refill takes less than 2 seconds.   Neurological:      Mental Status: He is alert and oriented to person, place, and time.      GCS: GCS eye subscore is 4. GCS verbal subscore is 5. GCS motor subscore is 6.      Cranial Nerves: Cranial nerves 2-12 are intact.   Psychiatric:         Attention and Perception: Attention normal.         Mood and Affect: Mood normal.         Speech: Speech normal.         Behavior: Behavior normal. Behavior is cooperative.         Thought Content: Thought content normal.          Vents:       Lines/Drains/Airways       Peripherally Inserted Central Catheter Line  Duration             PICC Triple Lumen 10/29/24 1000 right basilic <1 day              Drain  Duration                  Urethral Catheter 10/29/24 1000 16 Fr. <1 day              Peripheral Intravenous Line  Duration                  Peripheral IV - Single Lumen 10/29/24 2210 <1 day                    Significant Labs:    OSH labs reviewed - Lactic acid 1.9, sodium 132, potassium 7, chloride 101, CO2 21, , creatinine 5.38, glucose 120, calcium 10.5, albumin 4.5, AST 22, ALT 15, white blood cells 9, hemoglobin 14.5, hematocrit 44.8, platelets 203, digoxin level 0.1, magnesium 2.5, high sensitivity troponin 40 (reference range less than 45),   -urinalysis had 15 protein, 0-5 WBC, 0-1 RBC, 3-5 epithelial cell, moderate bacteria     New  "labs ordered STAT    Significant Imaging:   I have reviewed all pertinent imaging results/findings within the past 24 hours.  Assessment/Plan:     Pulmonary  Panlobular emphysema  Patient's COPD is controlled currently.  Patient is currently off COPD Pathway. Continue scheduled inhalers  and monitor respiratory status closely.     Cardiac/Vascular  * Atrial fibrillation with RVR  Patient has long standing persistent (>12 months) atrial fibrillation. Patient is currently in atrial fibrillation. [id number] Score: 1. The patients heart rate in the last 24 hours is as follows:  Pulse  Min: 114  Max: 145     Antiarrhythmics  amiodarone 360 mg/200 mL (1.8 mg/mL) infusion, Continuous, Intravenous  amiodarone 360 mg/200 mL (1.8 mg/mL) infusion, Continuous, Intravenous    Anticoagulants       Plan  - Replete lytes with a goal of K>4, Mg >2  - Patient is anticoagulated, home xarelto. Awaiting follow up labs to resume xarelto vs heparin infusion  - Patient's afib is currently uncontrolled. Will add amiodarone infusion  - hold home metoprolol given hypotension    HFrEF (heart failure with reduced ejection fraction)  EMR reveals outside TTE on 4/11/2024 with EF 30-40%, indeterminant diastolic dysfunction and "giant" right atrium and ventricle  RAUL on 4/12/2024 with EF 40-50%  - currently appears dry on exam with no supplemental oxygen requirement    Plan -  -follow up echo given hypotension  -strict I/O; daily weights  -IV hydration overnight  -continuous cardiac monitoring    Hypotension  Suspect multifactorial with IVVD and atrial fib with RVR  Plan  - optimize HR control  - IV hydration overnight  - hold home metoprolol  - will use phenylephrine infusion if needed to maintain MAP > 65 (avoiding levo s/t HR exacerbation)  - check echo in am    Renal/  Hyperkalemia  Hyperkalemia is likely due to CARINA.The patients most recent potassium results are listed below.  OSH reported 7.0 potassium at ED presentation    Plan  - s/p " treatment with lokelma, bicarb, calcium, dextrose/insulin  - Monitor for arrhythmias with EKG and/or continuous telemetry.   - Monitor potassium: follow up pending now, then serial monitoring pending response to initial therapy    CARINA (acute kidney injury)  CARINA is likely due to acute tubular necrosis caused by ischemia related to hypotension . Baseline creatinine is  0.97 . Most recent creatinine and eGFR are listed below.    OSH reported BUN/Cr 132/5.38 on presentation     Plan  - CARINA is  follow up result pending  - Avoid nephrotoxins and renally dose meds for GFR listed above  - Monitor urine output, serial BMP, and adjust therapy as needed  - hydrate overnight with bicarb infusion  - low threshold for nephrology consult pending follow up labs/electrolytes        Critical Care Daily Checklist:    A: Awake: RASS Goal/Actual Goal:    Actual:     B: Spontaneous Breathing Trial Performed?     C: SAT & SBT Coordinated?  N/a                      D: Delirium: CAM-ICU     E: Early Mobility Performed? Yes   F: Feeding Goal:    Status:     Current Diet Order   Procedures    Diet Renal      AS: Analgesia/Sedation prn   T: Thromboembolic Prophylaxis Heparin infusion   H: HOB > 300 Yes   U: Stress Ulcer Prophylaxis (if needed) pepcid   G: Glucose Control monitor   B: Bowel Function     I: Indwelling Catheter (Lines & Wilcox) Necessity reviewed   D: De-escalation of Antimicrobials/Pharmacotherapies reviewed    Plan for the day/ETD As above    Code Status:  Family/Goals of Care: Full Code  Goal home on discharge     Critical Care Time: 70 minutes  Critical secondary to Patient has a condition that poses threat to life and bodily function: Acute Renal Failure  Critical care was time spent personally by me on the following activities: development of treatment plan with patient or surrogate and bedside caregivers, discussions with consultants, evaluation of patient's response to treatment, examination of patient, ordering and  performing treatments and interventions, ordering and review of laboratory studies, ordering and review of radiographic studies, pulse oximetry, re-evaluation of patient's condition. This critical care time did not overlap with that of any other provider or involve time for any procedures.     ERICA Daniels-BC  Critical Care Medicine  [facility] ([facility])

## 2024-10-30 NOTE — CONSULTS
[facility] ([facility])  Nephrology  Consult Note    Patient Name: Layton Abrams  MRN: [id number]  Admission Date: 10/29/2024  Hospital Length of Stay: 1 days  Attending Provider: Joe Jennings MD   Primary Care Physician: Nellie Cadena MD  Principal Problem:Atrial fibrillation with RVR    Inpatient consult to Nephrology  Consult performed by: Justin Maradiaga MD  Consult ordered by: Makayla Dickey NP  Reason for consult: CARINA.  Assessment/Recommendations: See consult note.        Subjective:     HPI:  68-year-old gentleman with multiple medical problems including combined heart failure with left ventricular ejection fraction 40-45% per echocardiogram in May.  He had moderate to severe right ventricular dilation with reduced function then as well and had atrial dilation.  He does have CAD.  He also has history of atrial fibrillation and history of deep venous thrombosis and has been maintained on Xarelto.  He has no history of chronic kidney disease.  Creatinine value runs 0.7-0.8 as noted in June of this year.  He does not have any old urinalyses available in system here.  In June of this year urinalyses performed at [facility] revealed trace proteinuria no other concerning findings.  Review of all labs suggest patient inquired hepatitis-B infection and cleared that spontaneously.  He is noted to have had prior positive hepatitis-C virus antibody with viral load in 2022.  He was apparently remotely treated for hepatitis-C virus infection.    He was admitted here on October 29th (transfer from [facility]) with generalized weakness.  Found to have atrial fibrillation/RVR.  On admission BUN and creatinine were markedly above baseline at 1:32 a.m. and 5.4.  He was hyperkalemic.  He failed attempted cardioversion.  He received usual therapy to address hyperkalemia.  Wilcox catheter was placed.  He has required pressor (Bashir-Synephrine) infusion and admission to ICU.  He  was loaded on amiodarone.  Has remained in atrial fibrillation but rate controlled much better.    On day of admission here patient noted to have blood pressures in the 70s and 80s intermittently.  He had excellent urine output on day of admission here and has persisted with good urine output.  This has led to resolution of hyperkalemia and creatinine is improving.  Urinalysis at [facility] revealed trace proteinuria and a moderately concentrated specimen but no significant hematuria.  Renal ultrasound performed on October 30th unremarkable except for an 8 mm right mid pole renal cyst..    Patient currently family well and has no complaints.        Past Medical History:   Diagnosis Date    Arthritis     CHF (congestive heart failure)     COPD (chronic obstructive pulmonary disease)     Hypertension     Liver disease     Stroke        No past surgical history on file.    Review of patient's allergies indicates:  No Known Allergies  Current Facility-Administered Medications   Medication Frequency    acetaminophen tablet 650 mg Q6H PRN    amiodarone 360 mg/200 mL (1.8 mg/mL) infusion Continuous    arformoteroL nebulizer solution 15 mcg BID    budesonide nebulizer solution 0.5 mg Q12H    famotidine tablet 20 mg Daily    heparin 25,000 units in dextrose 5% (100 units/ml) IV bolus from bag LOW INTENSITY nomogram - [facility] PRN    heparin 25,000 units in dextrose 5% (100 units/ml) IV bolus from bag LOW INTENSITY nomogram - [facility] PRN    heparin 25,000 units in dextrose 5% 250 mL (100 units/mL) infusion LOW INTENSITY nomogram - [facility] Continuous    influenza (adjuvanted) (Fluad) 45 mcg/0.5 mL IM vaccine (> or = 66 yo) 0.5 mL Prior to discharge    lactated ringers infusion Continuous    mupirocin 2 % ointment BID    ondansetron injection 8 mg Q6H PRN    PHENYLephrine (GILBERTO-SYNEPHRINE) 20 mg in sodium chloride 0.9% 250ml (titrating) Continuous    pneumoc 20-haylee conj-dip cr(PF) (PREVNAR-20 (PF)) injection Syrg 0.5 mL vaccine x 1  dose    sodium zirconium cyclosilicate packet 10 g TID     Family History    None       Tobacco Use    Smoking status: Former     Current packs/day: 0.50     Types: Cigarettes    Smokeless tobacco: Never    Tobacco comments:     Quit 2010   Substance and Sexual Activity    Alcohol use: Not Currently    Drug use: Not Currently    Sexual activity: Not on file     Review of Systems    Significant for weakness as above.  This was acute and likely related to his rhythm abnormality.  Weight has been stable and energy level has been generally good.  He denied any chest pain with his arrhythmia.  He had some mild dyspnea but that has resolved.  No nausea or vomiting or diarrhea.  No black or bloody stools and no problems urinating.  Review of systems is otherwise negative for all other systems on 12 point review.    Objective:     Vital Signs (Most Recent):  Temp: 97.5 °F (36.4 °C) (10/30/24 0901)  Pulse: 93 (10/30/24 0901)  Resp: 20 (10/30/24 0901)  BP: 109/76 (10/30/24 0901)  SpO2: 95 % (10/30/24 0901) Vital Signs (24h Range):  Temp:  [96.8 °F (36 °C)-98.8 °F (37.1 °C)] 97.5 °F (36.4 °C)  Pulse:  [] 93  Resp:  [10-32] 20  SpO2:  [83 %-98 %] 95 %  BP: ()/(49-78) 109/76     Weight: 83 kg (183 lb) (10/30/24 0830)  Body mass index is 27.02 kg/m².  Body surface area is 2.01 meters squared.    I/O last 3 completed shifts:  In: 2141.2 [P.O.:240; I.V.:1354; IV Piggyback:547.1]  Out: 4525 [Urine:4525]    Physical Exam  Vitals reviewed.   Constitutional:       Appearance: Normal appearance.   HENT:      Head: Normocephalic and atraumatic.      Right Ear: External ear normal.      Left Ear: External ear normal.      Nose: Nose normal.      Mouth/Throat:      Mouth: Mucous membranes are dry.      Comments: Edentulous  Eyes:      General: No scleral icterus.  Cardiovascular:      Rate and Rhythm: Normal rate. Rhythm irregular.      Heart sounds:      No friction rub. No gallop.   Pulmonary:      Effort: Pulmonary effort is  normal.      Breath sounds: Normal breath sounds.   Abdominal:      General: Bowel sounds are normal.      Palpations: Abdomen is soft.      Tenderness: There is no abdominal tenderness.   Genitourinary:     Comments: Wilcox catheter in place with urine in receptacle.  Musculoskeletal:      Cervical back: Neck supple.   Lymphadenopathy:      Cervical: No cervical adenopathy.   Skin:     General: Skin is warm and dry.   Neurological:      General: No focal deficit present.      Mental Status: He is alert and oriented to person, place, and time.   Psychiatric:         Mood and Affect: Mood normal.         Behavior: Behavior normal.         Thought Content: Thought content normal.         Judgment: Judgment normal.         Significant Labs:  All labs within the past 24 hours have been reviewed.    Significant Imaging:  X-Ray: Reviewed  US: Reviewed    Assessment/Plan:     Active Diagnoses:    Diagnosis Date Noted POA    PRINCIPAL PROBLEM:  Atrial fibrillation with RVR [I48.91] 10/29/2024 Yes    CARINA (acute kidney injury) [N17.9] 10/29/2024 Yes    Hyperkalemia [E87.5] 10/29/2024 Yes    Hypotension [I95.9] 10/29/2024 Yes    Panlobular emphysema [J43.1] 10/29/2024 Yes    HFrEF (heart failure with reduced ejection fraction) [I50.20] 10/29/2024 Yes      Problems Resolved During this Admission:       Generalized weakness.  Likely due to atrial fibrillation with RVR.  Improving.    Atrial fibrillation/RVR.  Prior known history of such.  Maintained on Xarelto in the outpatient setting.  On amiodarone.  On heparin drip.  Outpatient Xarelto on hold.    Shock.  Required Bashir-Synephrine on admission.  Wean as able.    Acute kidney injury.  No known chronic kidney disease.  Nonoliguric and improving with supportive care.  Likely suffered some ATN in setting of renal hypoperfusion.  No other obvious causes.  Improving.  Continue fluids.    Resolved hyperkalemia.  Has renal restrictions on diet.  Also has thrice daily Lokelma  ordered.    Hyperphosphatemia of acute kidney injury.  Should improve with improving renal function.  No changes.    Combined heart failure with significant systolic dysfunction and severe right heart failure.     Reported CAD.    Multiple prior CVAs.      COPD.    Reported history of liver disease.    Thank you for your consult. I will follow-up with patient. Please contact us if you have any additional questions.    Justin Maradiaga MD  Nephrology  [facility] ([facility])

## 2024-10-30 NOTE — ASSESSMENT & PLAN NOTE
"EMR reveals outside TTE on 4/11/2024 with EF 30-40%, indeterminant diastolic dysfunction and "giant" right atrium and ventricle  RAUL on 4/12/2024 with EF 40-50%  - currently appears dry on exam with no supplemental oxygen requirement    Plan -  -follow up echo given hypotension  -strict I/O; daily weights  -IV hydration overnight  -continuous cardiac monitoring  "

## 2024-10-30 NOTE — SUBJECTIVE & OBJECTIVE
Past Medical History:   Diagnosis Date    Arthritis     CHF (congestive heart failure)     COPD (chronic obstructive pulmonary disease)     Hypertension     Liver disease     Stroke        No past surgical history on file.    Review of patient's allergies indicates:  No Known Allergies    Current Facility-Administered Medications on File Prior to Encounter   Medication    [DISCONTINUED] dextrose 50 % in water (D50W) injection    [DISCONTINUED] diltiaZEM injection    [DISCONTINUED] NORepinephrine 4 mg in dextrose 5% 250 mL infusion (premix)     No current outpatient medications on file prior to encounter.     Family History    None       Tobacco Use    Smoking status: Former     Current packs/day: 0.50     Types: Cigarettes    Smokeless tobacco: Never    Tobacco comments:     Quit 2010   Substance and Sexual Activity    Alcohol use: Not Currently    Drug use: Not Currently    Sexual activity: Not on file     Review of Systems   Constitutional: Positive for malaise/fatigue. Negative for decreased appetite, diaphoresis, fever and night sweats.   HENT:  Negative for nosebleeds.    Eyes:  Negative for blurred vision and double vision.   Cardiovascular:  Positive for irregular heartbeat and palpitations. Negative for chest pain, claudication, dyspnea on exertion, leg swelling, orthopnea, paroxysmal nocturnal dyspnea and syncope.   Respiratory:  Negative for cough, shortness of breath, sleep disturbances due to breathing, snoring, sputum production and wheezing.    Endocrine: Negative for cold intolerance and polyuria.   Hematologic/Lymphatic: Does not bruise/bleed easily.   Skin:  Negative for rash.   Musculoskeletal:  Negative for back pain, falls, joint pain, joint swelling and neck pain.   Gastrointestinal:  Negative for abdominal pain, heartburn, nausea and vomiting.   Genitourinary:  Negative for dysuria, frequency and hematuria.   Neurological:  Negative for difficulty with concentration, dizziness, focal weakness,  headaches, light-headedness, numbness, seizures and weakness.   Psychiatric/Behavioral:  Negative for depression, memory loss and substance abuse. The patient does not have insomnia.    Allergic/Immunologic: Negative for HIV exposure and hives.     Objective:     Vital Signs (Most Recent):  Temp: 97.2 °F (36.2 °C) (10/30/24 1245)  Pulse: 86 (10/30/24 1245)  Resp: (!) 25 (10/30/24 1245)  BP: 90/69 (10/30/24 1245)  SpO2: (!) 88 % (10/30/24 1245) Vital Signs (24h Range):  Temp:  [96.8 °F (36 °C)-98.8 °F (37.1 °C)] 97.2 °F (36.2 °C)  Pulse:  [] 86  Resp:  [10-32] 25  SpO2:  [73 %-98 %] 88 %  BP: ()/(49-78) 90/69     Weight: 83 kg (183 lb)  Body mass index is 27.02 kg/m².    SpO2: (!) 88 %         Intake/Output Summary (Last 24 hours) at 10/30/2024 1314  Last data filed at 10/30/2024 1202  Gross per 24 hour   Intake 3974.01 ml   Output 5775 ml   Net -1800.99 ml       Lines/Drains/Airways       Peripherally Inserted Central Catheter Line  Duration             PICC Triple Lumen 10/29/24 1000 right basilic 1 day              Drain  Duration                  Urethral Catheter 10/29/24 1000 16 Fr. 1 day              Peripheral Intravenous Line  Duration                  Peripheral IV - Single Lumen 10/29/24 20 G 1 1/4 in Anterior;Right Forearm 1 day         Peripheral IV - Single Lumen 10/29/24 2210 <1 day                     Physical Exam  HENT:      Head: Normocephalic.   Eyes:      Pupils: Pupils are equal, round, and reactive to light.   Neck:      Thyroid: No thyromegaly.      Vascular: Normal carotid pulses. No carotid bruit or JVD.   Cardiovascular:      Rate and Rhythm: Normal rate. Rhythm irregularly irregular. No extrasystoles are present.     Chest Wall: PMI is not displaced.      Pulses: Normal pulses.           Carotid pulses are 2+ on the right side and 2+ on the left side.     Heart sounds: Normal heart sounds. No murmur heard.     No gallop. No S3 sounds.   Pulmonary:      Effort: No respiratory  "distress.      Breath sounds: No stridor. Decreased breath sounds and rales present.   Abdominal:      General: Bowel sounds are normal.      Palpations: Abdomen is soft.      Tenderness: There is no abdominal tenderness. There is no rebound.   Skin:     Findings: No rash.   Neurological:      Mental Status: He is alert.          Significant Labs: ABG: No results for input(s): "PH", "PCO2", "HCO3", "POCSATURATED", "BE" in the last 48 hours., Blood Culture: No results for input(s): "LABBLOO" in the last 48 hours., BMP:   Recent Labs   Lab 10/29/24  2209 10/30/24  0159 10/30/24  0545 10/30/24  0908   * 88 94 166*    138 138 137   K 6.0* 5.5* 5.1 4.9    97 95 96   CO2 22* 28 28 28   * 148* 144* 134*   CREATININE 5.6* 5.0* 4.8* 4.7*   CALCIUM 10.5 9.8 9.9 9.7   MG 2.2  --  2.1  --    , CMP   Recent Labs   Lab 10/30/24  0159 10/30/24  0545 10/30/24  0908    138 137   K 5.5* 5.1 4.9   CL 97 95 96   CO2 28 28 28   GLU 88 94 166*   * 144* 134*   CREATININE 5.0* 4.8* 4.7*   CALCIUM 9.8 9.9 9.7   PROT  --  7.1  --    ALBUMIN  --  3.7  --    BILITOT  --  0.3  --    ALKPHOS  --  48  --    AST  --  20  --    ALT  --  13  --    ANIONGAP 13 15 13   , CBC   Recent Labs   Lab 10/29/24  2329 10/30/24  0545   WBC 8.86 8.87   HGB 13.8* 13.7*   HCT 40.8 40.8    218   , INR   Recent Labs   Lab 10/29/24  2329   INR 1.0   , Lipid Panel No results for input(s): "CHOL", "HDL", "LDLCALC", "TRIG", "CHOLHDL" in the last 48 hours., and Troponin   Recent Labs   Lab 10/29/24  2209   TROPONINI 0.042*       Significant Imaging: EKG:    "

## 2024-10-30 NOTE — EICU
EICU BRIEF INITIAL EVALUATION:       HISTORY:      68-year-old gentleman admitted to the ICU on amiodarone drip for atrial fibrillation with rapid ventricular rate.  Also noted to have acute kidney injury with hyperkalemia.    History of combined CHF, COPD, hypertension, liver disease, CVA, DVT    DVT prophylaxis on heparin drip   GI prophylaxis famotidine    BP 95/59 (71), 0 3, R 20, O2 sat,   Resting comfortably on room air    Chest x-ray with right arm PICC line with tip in good position at about the level of the [place].  No pneumothorax or consolidations.  Cardiomegaly      CAMERA ASSESSMENT: Yes      TELEMETRY: Reviewed      NOTES: Reviewed     LABS: Reviewed      IMAGING: Personally reviewed.      DISCUSSED with bedside nurse        ASSESSMENT AND PLAN:       Atrial fibrillation with RVR-rate controlled on amiodarone drip.  Continue amiodarone drip, anticoagulation, monitoring    History of CHF-does not appear volume overloaded at present-continue gentle hydration.  Monitor volume status.  Repeat echo pending    CARINA - avoid nephrotoxins, renally dose medications.  Monitor renal function and urine output.  Gentle hydration with bicarb drip as ordered    Hyperkalemia-has received treatment and potassium has improved.  Follow potassium      I have reviewed the plan of care for the patient and agree with the plan of care unless noted otherwise above.      NOE Hoyos MD  eICU Attending  [phone]    This report has been created through the use of M-Modal dictation software. Typographical and content errors may occur with this process. While efforts are made to detect and correct such errors, in some cases errors will persist. For this reason, wording in this document should be considered in the proper context and not strictly verbatim.

## 2024-10-30 NOTE — ASSESSMENT & PLAN NOTE
Patient has long standing persistent (>12 months) atrial fibrillation. Patient is currently in atrial fibrillation. [id number] Score: 1. The patients heart rate in the last 24 hours is as follows:  Pulse  Min: 114  Max: 145     Antiarrhythmics  amiodarone 360 mg/200 mL (1.8 mg/mL) infusion, Continuous, Intravenous  amiodarone 360 mg/200 mL (1.8 mg/mL) infusion, Continuous, Intravenous    Anticoagulants       Plan  - Replete lytes with a goal of K>4, Mg >2  - Patient is anticoagulated, home xarelto. Awaiting follow up labs to resume xarelto vs heparin infusion  - Patient's afib is currently uncontrolled. Will add amiodarone infusion  - hold home metoprolol given hypotension

## 2024-10-30 NOTE — HOSPITAL COURSE
10/30/2024: Patient with improving urine ouput, renal function on labs. Continuing fluids, additional bolus given. Requiring Amio and Bashir gtt. Nephro and Cardiology consulted.  10/31/2024: Continued improvement in renal function. RVR worsening, Amio converted to po and Dig ordered. Heparin gtt stopped, home xarelto restarted.

## 2024-10-30 NOTE — SUBJECTIVE & OBJECTIVE
Interval History: Patient reports that he feels better since admit. Denies pain.       Objective:     Vital Signs (Most Recent):  Temp: 97.3 °F (36.3 °C) (10/30/24 0830)  Pulse: 97 (10/30/24 0830)  Resp: (!) 28 (10/30/24 0830)  BP: 104/69 (10/30/24 0830)  SpO2: (!) 92 % (10/30/24 0830) Vital Signs (24h Range):  Temp:  [96.8 °F (36 °C)-98.8 °F (37.1 °C)] 97.3 °F (36.3 °C)  Pulse:  [] 97  Resp:  [10-32] 28  SpO2:  [83 %-98 %] 92 %  BP: ()/(49-78) 104/69     Weight: 83.1 kg (183 lb 3.2 oz)  Body mass index is 27.05 kg/m².      Intake/Output Summary (Last 24 hours) at 10/30/2024 1006  Last data filed at 10/30/2024 0846  Gross per 24 hour   Intake 3057.87 ml   Output 4775 ml   Net -1717.13 ml        Physical Exam  Constitutional:       General: He is not in acute distress.     Appearance: He is ill-appearing.   HENT:      Head: Normocephalic and atraumatic.      Nose: Nose normal.      Mouth/Throat:      Mouth: Mucous membranes are dry.   Eyes:      Pupils: Pupils are equal, round, and reactive to light.   Cardiovascular:      Rate and Rhythm: Normal rate. Rhythm irregular.      Pulses: Normal pulses.      Heart sounds: Normal heart sounds.   Pulmonary:      Effort: Pulmonary effort is normal. No respiratory distress.      Breath sounds: Normal breath sounds.   Abdominal:      General: Bowel sounds are normal. There is no distension.      Palpations: Abdomen is soft.   Musculoskeletal:         General: No swelling.      Cervical back: Normal range of motion and neck supple.   Skin:     General: Skin is warm and dry.   Neurological:      General: No focal deficit present.      Mental Status: He is oriented to person, place, and time.           Review of Systems   Respiratory:  Negative for cough, chest tightness and shortness of breath.    Cardiovascular:  Negative for chest pain, palpitations and leg swelling.   Gastrointestinal:  Negative for abdominal pain, constipation, diarrhea and nausea.           Lines/Drains/Airways       Peripherally Inserted Central Catheter Line  Duration             PICC Triple Lumen 10/29/24 1000 right basilic 1 day              Drain  Duration                  Urethral Catheter 10/29/24 1000 16 Fr. 1 day              Peripheral Intravenous Line  Duration                  Peripheral IV - Single Lumen 10/29/24 20 G 1 1/4 in Anterior;Right Forearm 1 day         Peripheral IV - Single Lumen 10/29/24 2210 <1 day                    Significant Labs:    CBC/Anemia Profile:  Recent Labs   Lab 10/29/24  2329 10/30/24  0545   WBC 8.86 8.87   HGB 13.8* 13.7*   HCT 40.8 40.8    218   MCV 83 83   RDW 13.6 13.6        Chemistries:  Recent Labs   Lab 10/29/24  2209 10/30/24  0159 10/30/24  0545 10/30/24  0908    138 138 137   K 6.0* 5.5* 5.1 4.9    97 95 96   CO2 22* 28 28 28   * 148* 144* 134*   CREATININE 5.6* 5.0* 4.8* 4.7*   CALCIUM 10.5 9.8 9.9 9.7   ALBUMIN  --   --  3.7  --    PROT  --   --  7.1  --    BILITOT  --   --  0.3  --    ALKPHOS  --   --  48  --    ALT  --   --  13  --    AST  --   --  20  --    MG 2.2  --  2.1  --    PHOS 5.3*  --  6.0*  --        All pertinent labs within the past 24 hours have been reviewed.    Significant Imaging:  I have reviewed all pertinent imaging results/findings within the past 24 hours.

## 2024-10-30 NOTE — ASSESSMENT & PLAN NOTE
CARINA is likely due to acute tubular necrosis caused by ischemia related to hypotension . Baseline creatinine is  0.97 . Most recent creatinine and eGFR are listed below.    OSH reported BUN/Cr 132/5.38 on presentation    Now 134/4.7     Plan  - Avoid nephrotoxins and renally dose meds for GFR listed above  - Monitor urine output, serial BMP, and adjust therapy as needed  - Continue LR maintenance fluids, bolus prn  - Nephro consulted  - US Kidneys pending  - Maintain Wilcox cath

## 2024-10-30 NOTE — ASSESSMENT & PLAN NOTE
AFIB now V controlled    Continue amio gtt for rate control  Continue heparin gtt  Ok for gentle hydratiob and maryjo gtt for ARF and ? Septal shock

## 2024-10-30 NOTE — ASSESSMENT & PLAN NOTE
Suspect multifactorial with IVVD and atrial fib with RVR  Plan  - optimize HR control  - IV hydration overnight  - hold home metoprolol  - will use phenylephrine infusion if needed to maintain MAP > 65 (avoiding levo s/t HR exacerbation)  - check echo in am

## 2024-10-30 NOTE — PROGRESS NOTES
"[facility] ([facility])  Critical Care Medicine  Progress Note    Patient Name: Layton Abrams  MRN: [id number]  Admission Date: 10/29/2024  Hospital Length of Stay: 1 days  Code Status: Full Code  Attending Provider: Joe Jennings MD  Primary Care Provider: No primary care provider on file.   Principal Problem: Atrial fibrillation with RVR    Subjective:     HPI:  68 year old male with known medical issues including HTN; CAD; HFrEF (4/2024 30-40%EF with ungraded diastolic dysfunction and "giant" rt heart; COPD; atrial fib plus DVT earlier this year on xarelto; HCV; prior CVA x 5    Presented to OSH ED on 10/29 with one day history of weakness  Eval revealed atrial fib with RVR; hypotension; CARINA with BUN/Cr 132/5.38 (baseline 0.97) and potassium 7  Treatment included attempted cardioversion (unsuccessful); calcium, dextrose/insulin, lactated Ringer's, IV metoprolol, IV bicarbonate, and Lokelma. Levophed infusion was initiated. PICC line and bustillo catheter were placed    [facility] was contacted for transfer for CARINA/hyperkalemia with potential need nephrology services along with atrial fib with RVR and hypotension    Seen and examined on arrival to [facility] ICU  AAOx3. Atrial fib on monitor with ventricular rate 130-140. Marginal BP with MAP 68  Complains only of intermittent severe RLE "cramping"  STAT follow up labs along with bicarb and amiodarone infusions ordered    Hospital/ICU Course:  10/30/2024: Patient with improving urine ouput, renal function on labs. Continuing fluids, additional bolus given. Requiring Amio and Bashir gtt. Nephro and Cardiology consulted.    Interval History: Patient reports that he feels better since admit. Denies pain.       Objective:     Vital Signs (Most Recent):  Temp: 97.3 °F (36.3 °C) (10/30/24 0830)  Pulse: 97 (10/30/24 0830)  Resp: (!) 28 (10/30/24 0830)  BP: 104/69 (10/30/24 0830)  SpO2: (!) 92 % (10/30/24 0830) Vital Signs (24h Range):  Temp:  [96.8 °F (36 °C)-98.8 " °F (37.1 °C)] 97.3 °F (36.3 °C)  Pulse:  [] 97  Resp:  [10-32] 28  SpO2:  [83 %-98 %] 92 %  BP: ()/(49-78) 104/69     Weight: 83.1 kg (183 lb 3.2 oz)  Body mass index is 27.05 kg/m².      Intake/Output Summary (Last 24 hours) at 10/30/2024 1006  Last data filed at 10/30/2024 0846  Gross per 24 hour   Intake 3057.87 ml   Output 4775 ml   Net -1717.13 ml        Physical Exam  Constitutional:       General: He is not in acute distress.     Appearance: He is ill-appearing.   HENT:      Head: Normocephalic and atraumatic.      Nose: Nose normal.      Mouth/Throat:      Mouth: Mucous membranes are dry.   Eyes:      Pupils: Pupils are equal, round, and reactive to light.   Cardiovascular:      Rate and Rhythm: Normal rate. Rhythm irregular.      Pulses: Normal pulses.      Heart sounds: Normal heart sounds.   Pulmonary:      Effort: Pulmonary effort is normal. No respiratory distress.      Breath sounds: Normal breath sounds.   Abdominal:      General: Bowel sounds are normal. There is no distension.      Palpations: Abdomen is soft.   Musculoskeletal:         General: No swelling.      Cervical back: Normal range of motion and neck supple.   Skin:     General: Skin is warm and dry.   Neurological:      General: No focal deficit present.      Mental Status: He is oriented to person, place, and time.           Review of Systems   Respiratory:  Negative for cough, chest tightness and shortness of breath.    Cardiovascular:  Negative for chest pain, palpitations and leg swelling.   Gastrointestinal:  Negative for abdominal pain, constipation, diarrhea and nausea.          Lines/Drains/Airways       Peripherally Inserted Central Catheter Line  Duration             PICC Triple Lumen 10/29/24 1000 right basilic 1 day              Drain  Duration                  Urethral Catheter 10/29/24 1000 16 Fr. 1 day              Peripheral Intravenous Line  Duration                  Peripheral IV - Single Lumen 10/29/24 20 G 1  "1/4 in Anterior;Right Forearm 1 day         Peripheral IV - Single Lumen 10/29/24 2210 <1 day                    Significant Labs:    CBC/Anemia Profile:  Recent Labs   Lab 10/29/24  2329 10/30/24  0545   WBC 8.86 8.87   HGB 13.8* 13.7*   HCT 40.8 40.8    218   MCV 83 83   RDW 13.6 13.6        Chemistries:  Recent Labs   Lab 10/29/24  2209 10/30/24  0159 10/30/24  0545 10/30/24  0908    138 138 137   K 6.0* 5.5* 5.1 4.9    97 95 96   CO2 22* 28 28 28   * 148* 144* 134*   CREATININE 5.6* 5.0* 4.8* 4.7*   CALCIUM 10.5 9.8 9.9 9.7   ALBUMIN  --   --  3.7  --    PROT  --   --  7.1  --    BILITOT  --   --  0.3  --    ALKPHOS  --   --  48  --    ALT  --   --  13  --    AST  --   --  20  --    MG 2.2  --  2.1  --    PHOS 5.3*  --  6.0*  --        All pertinent labs within the past 24 hours have been reviewed.    Significant Imaging:  I have reviewed all pertinent imaging results/findings within the past 24 hours.    ABG  No results for input(s): "PH", "PO2", "PCO2", "HCO3", "BE" in the last 168 hours.  Assessment/Plan:     Pulmonary  Panlobular emphysema  Patient's COPD is controlled currently.  Patient is currently off COPD Pathway. Continue scheduled inhalers  and monitor respiratory status closely.     Cardiac/Vascular  * Atrial fibrillation with RVR  Patient has long standing persistent (>12 months) atrial fibrillation. Patient is currently in atrial fibrillation. [id number] Score: 1. The patients heart rate in the last 24 hours is as follows:  Pulse  Min: 59  Max: 145     Antiarrhythmics  amiodarone 360 mg/200 mL (1.8 mg/mL) infusion, Continuous, Intravenous    Anticoagulants  heparin 25,000 units in dextrose 5% 250 mL (100 units/mL) infusion LOW INTENSITY nomogram - [facility], Continuous, Intravenous  heparin 25,000 units in dextrose 5% (100 units/ml) IV bolus from bag LOW INTENSITY nomogram - [facility], As needed (PRN), Intravenous  heparin 25,000 units in dextrose 5% (100 units/ml) IV bolus from bag " "LOW INTENSITY nomogram - [facility], As needed (PRN), Intravenous    Plan  - Replete lytes with a goal of K>4, Mg >2  - Patient is anticoagulated, home xarelto. Awaiting follow up labs to resume xarelto vs heparin infusion  - Patient's afib is currently uncontrolled. Will add amiodarone infusion - Continue  - hold home metoprolol given hypotension  - Cards consulted  - Heparin gtt for AC  - Cardiac monitoring    HFrEF (heart failure with reduced ejection fraction)  EMR reveals outside TTE on 4/11/2024 with EF 30-40%, indeterminant diastolic dysfunction and "giant" right atrium and ventricle  RAUL on 4/12/2024 with EF 40-50%  - currently appears dry on exam with no supplemental oxygen requirement    Plan -  -follow up echo given hypotension  -strict I/O; daily weights  -IV hydration overnight  -continuous cardiac monitoring    Hypotension  Suspect multifactorial with IVVD and atrial fib with RVR  Plan  - optimize HR control  - IV hydration overnight  - hold home metoprolol  - will use phenylephrine infusion if needed to maintain MAP > 65 (avoiding levo s/t HR exacerbation)  - check echo in am    Renal/  Hyperkalemia  Hyperkalemia is likely due to CARINA.The patients most recent potassium results are listed below.  OSH reported 7.0 potassium at ED presentation    Plan  - s/p treatment with lokelma, bicarb, calcium, dextrose/insulin  - Monitor for arrhythmias with EKG and/or continuous telemetry.   - Monitor potassium: follow up pending now, then serial monitoring pending response to initial therapy    CARINA (acute kidney injury)  CARINA is likely due to acute tubular necrosis caused by ischemia related to hypotension . Baseline creatinine is  0.97 . Most recent creatinine and eGFR are listed below.    OSH reported BUN/Cr 132/5.38 on presentation    Now 134/4.7     Plan  - Avoid nephrotoxins and renally dose meds for GFR listed above  - Monitor urine output, serial BMP, and adjust therapy as needed  - Continue LR maintenance fluids, " bolus prn  - Nephro consulted  - US Kidneys pending  - Maintain Wilcox cath      PUP: Famotidine  DVT ppx: Heparin gtt    Critical Care Time: 40 minutes  Critical secondary to Patient has a condition that poses threat to life and bodily function: Acute Renal Failure      Critical care was time spent personally by me on the following activities: development of treatment plan with patient or surrogate and bedside caregivers, discussions with consultants, evaluation of patient's response to treatment, examination of patient, ordering and performing treatments and interventions, ordering and review of laboratory studies, ordering and review of radiographic studies, pulse oximetry, re-evaluation of patient's condition. This critical care time did not overlap with that of any other provider or involve time for any procedures.     Makayla Dickey NP  Critical Care Medicine  [facility] ([facility])

## 2024-10-30 NOTE — CONSULTS
"[facility] - Intensive Care (Hospital)  Cardiology  Consult Note    Patient Name: Layton Abrams  MRN: [id number]  Admission Date: 10/29/2024  Hospital Length of Stay: 1 days  Code Status: Full Code   Attending Provider: Joe Jennings MD   Consulting Provider: Yosef Ocasio MD  Primary Care Physician: Nellie Cadena MD  Principal Problem:Atrial fibrillation with RVR    Patient information was obtained from patient and ER records.     Inpatient consult to Cardiology  Consult performed by: Yosef Ocasio MD  Consult ordered by: Makayla Dickey NP        Subjective:       HPI:   Presented to [facility] ED on 10/29 with one day history of weakness  Eval revealed atrial fib with RVR; hypotension; CARINA with BUN/Cr 132/5.38 (baseline 0.97) and potassium 7  Treatment included attempted cardioversion (unsuccessful); calcium, dextrose/insulin, lactated Ringer's, IV metoprolol, IV bicarbonate, and Lokelma. Levophed infusion was initiated. PICC line and bustillo catheter were placed     [facility] was contacted for transfer for CARINA/hyperkalemia with potential need nephrology services along with atrial fib with RVR and hypotension     Seen and examined on arrival to [facility] ICU  AAOx3. Atrial fib on monitor with ventricular rate 130-140. Marginal BP with MAP 68  Complains only of intermittent severe RLE "cramping"  STAT follow up labs along with bicarb and amiodarone infusions ordered     Cardiology consult for afib with RVR.  PMH chronic afib, HTN; CAD; HFrEF (4/2024 30-40%EF with ungraded diastolic dysfunction and "giant" rt heart; COPD; h/o DVT earlier this year on xarelto; HCV; prior CVA x 5. Prior cardiologist Dr. Cowan.  Admitted for ARF low BP and afib with RVR. Baseline Cr 0.8 and was 5 and improved to 4.7 after IVF  Multiple ulcers and abscess identified by wound care team  Now AFIB VR controlled, BP at 90/60 with nuke gtt   No active bleeding. On heparin gtt.   EKG AFIB RBBB    Past Medical History:   Diagnosis Date    " Arthritis     CHF (congestive heart failure)     COPD (chronic obstructive pulmonary disease)     Hypertension     Liver disease     Stroke        No past surgical history on file.    Review of patient's allergies indicates:  No Known Allergies    Current Facility-Administered Medications on File Prior to Encounter   Medication    [DISCONTINUED] dextrose 50 % in water (D50W) injection    [DISCONTINUED] diltiaZEM injection    [DISCONTINUED] NORepinephrine 4 mg in dextrose 5% 250 mL infusion (premix)     No current outpatient medications on file prior to encounter.     Family History    None       Tobacco Use    Smoking status: Former     Current packs/day: 0.50     Types: Cigarettes    Smokeless tobacco: Never    Tobacco comments:     Quit 2010   Substance and Sexual Activity    Alcohol use: Not Currently    Drug use: Not Currently    Sexual activity: Not on file     Review of Systems   Constitutional: Positive for malaise/fatigue. Negative for decreased appetite, diaphoresis, fever and night sweats.   HENT:  Negative for nosebleeds.    Eyes:  Negative for blurred vision and double vision.   Cardiovascular:  Positive for irregular heartbeat and palpitations. Negative for chest pain, claudication, dyspnea on exertion, leg swelling, orthopnea, paroxysmal nocturnal dyspnea and syncope.   Respiratory:  Negative for cough, shortness of breath, sleep disturbances due to breathing, snoring, sputum production and wheezing.    Endocrine: Negative for cold intolerance and polyuria.   Hematologic/Lymphatic: Does not bruise/bleed easily.   Skin:  Negative for rash.   Musculoskeletal:  Negative for back pain, falls, joint pain, joint swelling and neck pain.   Gastrointestinal:  Negative for abdominal pain, heartburn, nausea and vomiting.   Genitourinary:  Negative for dysuria, frequency and hematuria.   Neurological:  Negative for difficulty with concentration, dizziness, focal weakness, headaches, light-headedness, numbness,  seizures and weakness.   Psychiatric/Behavioral:  Negative for depression, memory loss and substance abuse. The patient does not have insomnia.    Allergic/Immunologic: Negative for HIV exposure and hives.     Objective:     Vital Signs (Most Recent):  Temp: 97.2 °F (36.2 °C) (10/30/24 1245)  Pulse: 86 (10/30/24 1245)  Resp: (!) 25 (10/30/24 1245)  BP: 90/69 (10/30/24 1245)  SpO2: (!) 88 % (10/30/24 1245) Vital Signs (24h Range):  Temp:  [96.8 °F (36 °C)-98.8 °F (37.1 °C)] 97.2 °F (36.2 °C)  Pulse:  [] 86  Resp:  [10-32] 25  SpO2:  [73 %-98 %] 88 %  BP: ()/(49-78) 90/69     Weight: 83 kg (183 lb)  Body mass index is 27.02 kg/m².    SpO2: (!) 88 %         Intake/Output Summary (Last 24 hours) at 10/30/2024 1314  Last data filed at 10/30/2024 1202  Gross per 24 hour   Intake 3974.01 ml   Output 5775 ml   Net -1800.99 ml       Lines/Drains/Airways       Peripherally Inserted Central Catheter Line  Duration             PICC Triple Lumen 10/29/24 1000 right basilic 1 day              Drain  Duration                  Urethral Catheter 10/29/24 1000 16 Fr. 1 day              Peripheral Intravenous Line  Duration                  Peripheral IV - Single Lumen 10/29/24 20 G 1 1/4 in Anterior;Right Forearm 1 day         Peripheral IV - Single Lumen 10/29/24 2210 <1 day                     Physical Exam  HENT:      Head: Normocephalic.   Eyes:      Pupils: Pupils are equal, round, and reactive to light.   Neck:      Thyroid: No thyromegaly.      Vascular: Normal carotid pulses. No carotid bruit or JVD.   Cardiovascular:      Rate and Rhythm: Normal rate. Rhythm irregularly irregular. No extrasystoles are present.     Chest Wall: PMI is not displaced.      Pulses: Normal pulses.           Carotid pulses are 2+ on the right side and 2+ on the left side.     Heart sounds: Normal heart sounds. No murmur heard.     No gallop. No S3 sounds.   Pulmonary:      Effort: No respiratory distress.      Breath sounds: No stridor.  "Decreased breath sounds and rales present.   Abdominal:      General: Bowel sounds are normal.      Palpations: Abdomen is soft.      Tenderness: There is no abdominal tenderness. There is no rebound.   Skin:     Findings: No rash.   Neurological:      Mental Status: He is alert.          Significant Labs: ABG: No results for input(s): "PH", "PCO2", "HCO3", "POCSATURATED", "BE" in the last 48 hours., Blood Culture: No results for input(s): "LABBLOO" in the last 48 hours., BMP:   Recent Labs   Lab 10/29/24  2209 10/30/24  0159 10/30/24  0545 10/30/24  0908   * 88 94 166*    138 138 137   K 6.0* 5.5* 5.1 4.9    97 95 96   CO2 22* 28 28 28   * 148* 144* 134*   CREATININE 5.6* 5.0* 4.8* 4.7*   CALCIUM 10.5 9.8 9.9 9.7   MG 2.2  --  2.1  --    , CMP   Recent Labs   Lab 10/30/24  0159 10/30/24  0545 10/30/24  0908    138 137   K 5.5* 5.1 4.9   CL 97 95 96   CO2 28 28 28   GLU 88 94 166*   * 144* 134*   CREATININE 5.0* 4.8* 4.7*   CALCIUM 9.8 9.9 9.7   PROT  --  7.1  --    ALBUMIN  --  3.7  --    BILITOT  --  0.3  --    ALKPHOS  --  48  --    AST  --  20  --    ALT  --  13  --    ANIONGAP 13 15 13   , CBC   Recent Labs   Lab 10/29/24  2329 10/30/24  0545   WBC 8.86 8.87   HGB 13.8* 13.7*   HCT 40.8 40.8    218   , INR   Recent Labs   Lab 10/29/24  2329   INR 1.0   , Lipid Panel No results for input(s): "CHOL", "HDL", "LDLCALC", "TRIG", "CHOLHDL" in the last 48 hours., and Troponin   Recent Labs   Lab 10/29/24  2209   TROPONINI 0.042*       Significant Imaging: EKG:    Assessment and Plan:     * Atrial fibrillation with RVR  AFIB now V controlled    Continue amio gtt for rate control  Continue heparin gtt  Ok for gentle hydratiob and maryjo gtt for ARF and ? Septal shock       HFrEF (heart failure with reduced ejection fraction)  Advise to check CVP     Ok to hold ToprolXl and aldactone due to low BP       Hypotension  Continue IVF      Hyperkalemia  Now K normalized    CARINA (acute " kidney injury)  On IVF        VTE Risk Mitigation (From admission, onward)           Ordered     heparin 25,000 units in dextrose 5% (100 units/ml) IV bolus from bag LOW INTENSITY nomogram - [facility]  As needed (PRN)        Question:  Heparin Infusion Adjustment (DO NOT MODIFY ANSWER)  Answer:  [email or web address]\epic\Images\Pharmacy\HeparinInfusions\heparin LOW INTENSITY nomogram for [facility] [id number]    10/29/24 2306     heparin 25,000 units in dextrose 5% (100 units/ml) IV bolus from bag LOW INTENSITY nomogram - [facility]  As needed (PRN)        Question:  Heparin Infusion Adjustment (DO NOT MODIFY ANSWER)  Answer:  [email or web address]\epic\Images\Pharmacy\HeparinInfusions\heparin LOW INTENSITY nomogram for [facility] [id number]    10/29/24 2306     heparin 25,000 units in dextrose 5% 250 mL (100 units/mL) infusion LOW INTENSITY nomogram - [facility]  Continuous        Question:  Begin at (units/kg/hr)  Answer:  12    10/29/24 2306                    Thank you for your consult. I will follow-up with patient. Please contact us if you have any additional questions.    Yosef Ocasio MD  Cardiology   [facility] ([facility])

## 2024-10-31 LAB
ALBUMIN SERPL BCP-MCNC: 2.7 G/DL (ref 3.5–5.2)
ALBUMIN SERPL BCP-MCNC: 2.7 G/DL (ref 3.5–5.2)
ALP SERPL-CCNC: 32 U/L (ref 40–150)
ALT SERPL W/O P-5'-P-CCNC: 8 U/L (ref 10–44)
ANION GAP SERPL CALC-SCNC: 13 MMOL/L (ref 8–16)
ANISOCYTOSIS BLD QL SMEAR: SLIGHT
APTT PPP: 36 SEC (ref 21–32)
APTT PPP: 41.6 SEC (ref 21–32)
AST SERPL-CCNC: 16 U/L (ref 10–40)
BASOPHILS # BLD AUTO: 0.01 K/UL (ref 0–0.2)
BASOPHILS NFR BLD: 0.2 % (ref 0–1.9)
BILIRUB DIRECT SERPL-MCNC: <0.1 MG/DL (ref 0.1–0.3)
BILIRUB SERPL-MCNC: 0.2 MG/DL (ref 0.1–1)
BUN SERPL-MCNC: 93 MG/DL (ref 8–23)
CALCIUM SERPL-MCNC: 8.2 MG/DL (ref 8.7–10.5)
CHLORIDE SERPL-SCNC: 103 MMOL/L (ref 95–110)
CO2 SERPL-SCNC: 21 MMOL/L (ref 23–29)
CREAT SERPL-MCNC: 2.5 MG/DL (ref 0.5–1.4)
DACRYOCYTES BLD QL SMEAR: ABNORMAL
DIFFERENTIAL METHOD BLD: ABNORMAL
EOSINOPHIL # BLD AUTO: 0.2 K/UL (ref 0–0.5)
EOSINOPHIL NFR BLD: 4.1 % (ref 0–8)
ERYTHROCYTE [DISTWIDTH] IN BLOOD BY AUTOMATED COUNT: 13.6 % (ref 11.5–14.5)
EST. GFR  (NO RACE VARIABLE): 27 ML/MIN/1.73 M^2
GLUCOSE SERPL-MCNC: 113 MG/DL (ref 70–110)
HCT VFR BLD AUTO: 30.6 % (ref 40–54)
HGB BLD-MCNC: 10 G/DL (ref 14–18)
IMM GRANULOCYTES # BLD AUTO: 0.01 K/UL (ref 0–0.04)
IMM GRANULOCYTES NFR BLD AUTO: 0.2 % (ref 0–0.5)
LYMPHOCYTES # BLD AUTO: 1 K/UL (ref 1–4.8)
LYMPHOCYTES NFR BLD: 17.4 % (ref 18–48)
MAGNESIUM SERPL-MCNC: 1.3 MG/DL (ref 1.6–2.6)
MAGNESIUM SERPL-MCNC: 2.4 MG/DL (ref 1.6–2.6)
MCH RBC QN AUTO: 28.2 PG (ref 27–31)
MCHC RBC AUTO-ENTMCNC: 32.7 G/DL (ref 32–36)
MCV RBC AUTO: 86 FL (ref 82–98)
MONOCYTES # BLD AUTO: 0.5 K/UL (ref 0.3–1)
MONOCYTES NFR BLD: 7.7 % (ref 4–15)
NEUTROPHILS # BLD AUTO: 4.1 K/UL (ref 1.8–7.7)
NEUTROPHILS NFR BLD: 70.4 % (ref 38–73)
NRBC BLD-RTO: 0 /100 WBC
OHS QRS DURATION: 118 MS
OHS QTC CALCULATION: 507 MS
OVALOCYTES BLD QL SMEAR: ABNORMAL
PHOSPHATE SERPL-MCNC: 3.3 MG/DL (ref 2.7–4.5)
PHOSPHATE SERPL-MCNC: 3.3 MG/DL (ref 2.7–4.5)
PLATELET # BLD AUTO: 122 K/UL (ref 150–450)
PLATELET BLD QL SMEAR: ABNORMAL
PMV BLD AUTO: 9.9 FL (ref 9.2–12.9)
POIKILOCYTOSIS BLD QL SMEAR: SLIGHT
POTASSIUM SERPL-SCNC: 4.3 MMOL/L (ref 3.5–5.1)
PROT SERPL-MCNC: 5 G/DL (ref 6–8.4)
RBC # BLD AUTO: 3.55 M/UL (ref 4.6–6.2)
SODIUM SERPL-SCNC: 137 MMOL/L (ref 136–145)
WBC # BLD AUTO: 5.81 K/UL (ref 3.9–12.7)

## 2024-10-31 PROCEDURE — 25000003 PHARM REV CODE 250: Performed by: NURSE PRACTITIONER

## 2024-10-31 PROCEDURE — 93005 ELECTROCARDIOGRAM TRACING: CPT

## 2024-10-31 PROCEDURE — 25000003 PHARM REV CODE 250: Performed by: PHYSICIAN ASSISTANT

## 2024-10-31 PROCEDURE — 63600175 PHARM REV CODE 636 W HCPCS: Performed by: INTERNAL MEDICINE

## 2024-10-31 PROCEDURE — 02HV33Z INSERTION OF INFUSION DEVICE INTO SUPERIOR VENA CAVA, PERCUTANEOUS APPROACH: ICD-10-PCS | Performed by: SPECIALIST

## 2024-10-31 PROCEDURE — 94761 N-INVAS EAR/PLS OXIMETRY MLT: CPT

## 2024-10-31 PROCEDURE — 99233 SBSQ HOSP IP/OBS HIGH 50: CPT | Mod: ,,, | Performed by: INTERNAL MEDICINE

## 2024-10-31 PROCEDURE — 21400001 HC TELEMETRY ROOM

## 2024-10-31 PROCEDURE — 85730 THROMBOPLASTIN TIME PARTIAL: CPT | Performed by: SPECIALIST

## 2024-10-31 PROCEDURE — 83735 ASSAY OF MAGNESIUM: CPT | Performed by: NURSE PRACTITIONER

## 2024-10-31 PROCEDURE — 63600175 PHARM REV CODE 636 W HCPCS: Performed by: NURSE PRACTITIONER

## 2024-10-31 PROCEDURE — 25000003 PHARM REV CODE 250: Performed by: SPECIALIST

## 2024-10-31 PROCEDURE — 36569 INSJ PICC 5 YR+ W/O IMAGING: CPT

## 2024-10-31 PROCEDURE — 84460 ALANINE AMINO (ALT) (SGPT): CPT | Performed by: NURSE PRACTITIONER

## 2024-10-31 PROCEDURE — 80069 RENAL FUNCTION PANEL: CPT | Performed by: NURSE PRACTITIONER

## 2024-10-31 PROCEDURE — 94640 AIRWAY INHALATION TREATMENT: CPT

## 2024-10-31 PROCEDURE — 25000242 PHARM REV CODE 250 ALT 637 W/ HCPCS: Performed by: NURSE PRACTITIONER

## 2024-10-31 PROCEDURE — 85025 COMPLETE CBC W/AUTO DIFF WBC: CPT | Performed by: NURSE PRACTITIONER

## 2024-10-31 PROCEDURE — 93010 ELECTROCARDIOGRAM REPORT: CPT | Mod: ,,, | Performed by: INTERNAL MEDICINE

## 2024-10-31 PROCEDURE — 51702 INSERT TEMP BLADDER CATH: CPT

## 2024-10-31 PROCEDURE — 83735 ASSAY OF MAGNESIUM: CPT | Mod: 91 | Performed by: SPECIALIST

## 2024-10-31 RX ORDER — DIGOXIN 0.25 MG/ML
500 INJECTION INTRAMUSCULAR; INTRAVENOUS ONCE
Status: COMPLETED | OUTPATIENT
Start: 2024-10-31 | End: 2024-10-31

## 2024-10-31 RX ORDER — MAGNESIUM SULFATE HEPTAHYDRATE 40 MG/ML
4 INJECTION, SOLUTION INTRAVENOUS ONCE
Status: COMPLETED | OUTPATIENT
Start: 2024-10-31 | End: 2024-10-31

## 2024-10-31 RX ORDER — METOPROLOL TARTRATE 25 MG/1
25 TABLET, FILM COATED ORAL 2 TIMES DAILY
Status: DISCONTINUED | OUTPATIENT
Start: 2024-10-31 | End: 2024-10-31

## 2024-10-31 RX ORDER — METOPROLOL TARTRATE 25 MG/1
25 TABLET, FILM COATED ORAL EVERY 6 HOURS
Status: DISCONTINUED | OUTPATIENT
Start: 2024-10-31 | End: 2024-11-03

## 2024-10-31 RX ORDER — AMIODARONE HYDROCHLORIDE 200 MG/1
200 TABLET ORAL 2 TIMES DAILY
Status: COMPLETED | OUTPATIENT
Start: 2024-10-31 | End: 2024-10-31

## 2024-10-31 RX ORDER — AMIODARONE HYDROCHLORIDE 200 MG/1
200 TABLET ORAL DAILY
Status: DISCONTINUED | OUTPATIENT
Start: 2024-11-01 | End: 2024-11-15 | Stop reason: HOSPADM

## 2024-10-31 RX ORDER — DIGOXIN 0.25 MG/ML
250 INJECTION INTRAMUSCULAR; INTRAVENOUS DAILY
Status: DISCONTINUED | OUTPATIENT
Start: 2024-11-01 | End: 2024-10-31

## 2024-10-31 RX ORDER — AMOXICILLIN 250 MG
1 CAPSULE ORAL 2 TIMES DAILY
Status: DISCONTINUED | OUTPATIENT
Start: 2024-10-31 | End: 2024-11-15 | Stop reason: HOSPADM

## 2024-10-31 RX ORDER — DIGOXIN 0.25 MG/ML
125 INJECTION INTRAMUSCULAR; INTRAVENOUS DAILY
Status: DISCONTINUED | OUTPATIENT
Start: 2024-11-01 | End: 2024-10-31

## 2024-10-31 RX ADMIN — FAMOTIDINE 20 MG: 20 TABLET ORAL at 08:10

## 2024-10-31 RX ADMIN — SODIUM CHLORIDE, POTASSIUM CHLORIDE, SODIUM LACTATE AND CALCIUM CHLORIDE: 600; 310; 30; 20 INJECTION, SOLUTION INTRAVENOUS at 10:10

## 2024-10-31 RX ADMIN — SODIUM ZIRCONIUM CYCLOSILICATE 10 G: 5 POWDER, FOR SUSPENSION ORAL at 08:10

## 2024-10-31 RX ADMIN — ARFORMOTEROL TARTRATE 15 MCG: 15 SOLUTION RESPIRATORY (INHALATION) at 08:10

## 2024-10-31 RX ADMIN — ARFORMOTEROL TARTRATE 15 MCG: 15 SOLUTION RESPIRATORY (INHALATION) at 07:10

## 2024-10-31 RX ADMIN — SODIUM CHLORIDE, POTASSIUM CHLORIDE, SODIUM LACTATE AND CALCIUM CHLORIDE 1000 ML: 600; 310; 30; 20 INJECTION, SOLUTION INTRAVENOUS at 08:10

## 2024-10-31 RX ADMIN — SENNOSIDES AND DOCUSATE SODIUM 1 TABLET: 50; 8.6 TABLET ORAL at 11:10

## 2024-10-31 RX ADMIN — SODIUM CHLORIDE, POTASSIUM CHLORIDE, SODIUM LACTATE AND CALCIUM CHLORIDE: 600; 310; 30; 20 INJECTION, SOLUTION INTRAVENOUS at 01:10

## 2024-10-31 RX ADMIN — Medication 0.25 MCG/KG/MIN: at 12:10

## 2024-10-31 RX ADMIN — SODIUM CHLORIDE, POTASSIUM CHLORIDE, SODIUM LACTATE AND CALCIUM CHLORIDE: 600; 310; 30; 20 INJECTION, SOLUTION INTRAVENOUS at 08:10

## 2024-10-31 RX ADMIN — SODIUM CHLORIDE, POTASSIUM CHLORIDE, SODIUM LACTATE AND CALCIUM CHLORIDE 250 ML: 600; 310; 30; 20 INJECTION, SOLUTION INTRAVENOUS at 01:10

## 2024-10-31 RX ADMIN — HEPARIN SODIUM 8 UNITS/KG/HR: 10000 INJECTION, SOLUTION INTRAVENOUS at 05:10

## 2024-10-31 RX ADMIN — AMIODARONE HYDROCHLORIDE 200 MG: 200 TABLET ORAL at 08:10

## 2024-10-31 RX ADMIN — METOPROLOL TARTRATE 25 MG: 25 TABLET, FILM COATED ORAL at 11:10

## 2024-10-31 RX ADMIN — DIGOXIN 500 MCG: 250 INJECTION, SOLUTION INTRAMUSCULAR; INTRAVENOUS at 08:10

## 2024-10-31 RX ADMIN — BUDESONIDE INHALATION 0.5 MG: 0.5 SUSPENSION RESPIRATORY (INHALATION) at 07:10

## 2024-10-31 RX ADMIN — METOPROLOL TARTRATE 25 MG: 25 TABLET, FILM COATED ORAL at 12:10

## 2024-10-31 RX ADMIN — MAGNESIUM SULFATE HEPTAHYDRATE 4 G: 40 INJECTION, SOLUTION INTRAVENOUS at 06:10

## 2024-10-31 RX ADMIN — AMIODARONE HYDROCHLORIDE 0.5 MG/MIN: 1.8 INJECTION, SOLUTION INTRAVENOUS at 05:10

## 2024-10-31 RX ADMIN — MUPIROCIN: 20 OINTMENT TOPICAL at 08:10

## 2024-10-31 RX ADMIN — AMIODARONE HYDROCHLORIDE 200 MG: 200 TABLET ORAL at 10:10

## 2024-10-31 RX ADMIN — BUDESONIDE INHALATION 0.5 MG: 0.5 SUSPENSION RESPIRATORY (INHALATION) at 08:10

## 2024-10-31 RX ADMIN — METOPROLOL TARTRATE 25 MG: 25 TABLET, FILM COATED ORAL at 06:10

## 2024-10-31 RX ADMIN — RIVAROXABAN 15 MG: 15 TABLET, FILM COATED ORAL at 10:10

## 2024-10-31 RX ADMIN — SENNOSIDES AND DOCUSATE SODIUM 1 TABLET: 50; 8.6 TABLET ORAL at 08:10

## 2024-10-31 NOTE — ASSESSMENT & PLAN NOTE
"EMR reveals outside TTE on 4/11/2024 with EF 30-40%, indeterminant diastolic dysfunction and "giant" right atrium and ventricle  RAUL on 4/12/2024 with EF 40-50%  - currently appears dry on exam with no supplemental oxygen requirement    New Echo with 60-65% EF    Plan -  -strict I/O; daily weights  -IV hydration  -continuous cardiac monitoring  "

## 2024-10-31 NOTE — PLAN OF CARE
10/31/24 0910   Post-Acute Status   Post-Acute Authorization Placement   Post-Acute Placement Status Referrals Sent   Discharge Plan   Discharge Plan A Long-term acute care facility (LTAC)     LTAC referral sent to [facility] in [place] for review.     1315: Clinical liaison at bedside for assessment. Cardiac meds changed to PO. Does not meet LTAC criteria.

## 2024-10-31 NOTE — CONSULTS
[facility] Medicine  Consult Note    Patient Name: Layton Abrams  MRN: [id number]  Admission Date: 10/29/2024  Hospital Length of Stay: 2 days  Attending Physician:  Dr. Jennings  Primary Care Provider: Nellie Cadena MD           Patient information was obtained from patient, past medical records, and ER records.     Consults  Subjective:     Principal Problem: Atrial fibrillation with RVR    Chief Complaint: No chief complaint on file.       HPI: The patient is 68-year-old male with a history of atrial fibrillation, coronary artery disease with stenting, hypertension, stroke with right-sided deficits, COPD, HCV, left peroneal DVT, Xarelto use, and lower extremity cellulitis presented to [facility] Emergency Department on October 29 with weakness that began on the morning of presentation.  By report he had been feeling well the day prior but did not feel well on October 29.  In the emergency department he had AFib with RVR and hypotension, but oxygenation was stable on room air.  Patient was drowsy but would wake and speak without difficulty.  He reported no chest pain or shortness of breath.  Labs were concerning for CARINA with  and creatinine 5.38.  Potassium was 7.  High sensitivity troponin was in the normal range at 40.  Chest x-ray had clear lungs but did show marked cardiomegaly.  ED attempted cardioversion with the AFib with RVR, but the patient did not cardiovert.  By report, EKGs did not show evidence of significant peak T-waves. He received calcium, dextrose/insulin, lactated Ringer's, IV metoprolol, IV bicarbonate, and Lokelma.  Levophed infusion was initiated.  Blood pressure improved with the Levophed, but tachycardia persisted.  Patient has a PICC line in place.  He also had a Wilcox catheter placed and had a small amount of urine with catheter placement.  Case discussed with the ED doctor and with Critical Care Medicine at [facility].   Patient transfer to [facility] and admitted to the ICU. He was placed on bicarb and amiodarone infusions.        Past Medical History:   Diagnosis Date    Arthritis     CHF (congestive heart failure)     COPD (chronic obstructive pulmonary disease)     Hypertension     Liver disease     Stroke        No past surgical history on file.    Review of patient's allergies indicates:  No Known Allergies    Current Facility-Administered Medications on File Prior to Encounter   Medication    [DISCONTINUED] GENERIC EXTERNAL MEDICATION     No current outpatient medications on file prior to encounter.     Family History    None       Tobacco Use    Smoking status: Former     Current packs/day: 0.50     Types: Cigarettes    Smokeless tobacco: Never    Tobacco comments:     Quit 2010   Substance and Sexual Activity    Alcohol use: Not Currently    Drug use: Not Currently    Sexual activity: Not on file     Review of Systems   Constitutional:  Positive for activity change and fatigue.   Cardiovascular:  Positive for palpitations.   Neurological:  Positive for weakness.     Objective:     Vital Signs (Most Recent):  Temp: 98.4 °F (36.9 °C) (10/31/24 1636)  Pulse: (!) 115 (10/31/24 1746)  Resp: 18 (10/31/24 1636)  BP: (!) 113/56 (10/31/24 1636)  SpO2: 97 % (10/31/24 1636) Vital Signs (24h Range):  Temp:  [97.4 °F (36.3 °C)-98.8 °F (37.1 °C)] 98.4 °F (36.9 °C)  Pulse:  [] 115  Resp:  [9-34] 18  SpO2:  [87 %-100 %] 97 %  BP: ()/(50-87) 113/56     Weight: 86 kg (189 lb 9.5 oz)  Body mass index is 28 kg/m².     Physical Exam  Constitutional:       Appearance: He is ill-appearing.   HENT:      Head: Normocephalic and atraumatic.   Neck:      Vascular: JVD present.   Cardiovascular:      Rate and Rhythm: Tachycardia present. Rhythm irregular.      Heart sounds: No murmur heard.  Pulmonary:      Effort: Pulmonary effort is normal. No respiratory distress.      Breath sounds: Normal breath sounds. No wheezing.    Abdominal:      General: Bowel sounds are normal. There is no distension.      Palpations: Abdomen is soft.      Tenderness: There is no abdominal tenderness.   Musculoskeletal:         General: No swelling.   Skin:     General: Skin is warm and dry.   Neurological:      Mental Status: He is alert and oriented to person, place, and time. Mental status is at baseline.          Significant Labs: All pertinent labs within the past 24 hours have been reviewed.  Recent Lab Results         10/31/24  1049   10/31/24  0613   10/31/24  0436   10/31/24  0027        Albumin     2.7              2.7         ALP     32         ALT     8         Anion Gap     13         Aniso     Slight         PTT   36.0  Comment: Refer to local heparin nomogram for intensity/dose specific   therapeutic   range.       41.6  Comment: Refer to local heparin nomogram for intensity/dose specific   therapeutic   range.         AST     16         Baso #     0.01         Basophil %     0.2         Bilirubin Direct     <0.1         BILIRUBIN TOTAL     0.2  Comment: For infants and newborns, interpretation of results should be based  on gestational age, weight and in agreement with clinical  observations.    Premature Infant recommended reference ranges:  Up to 24 hours.............<8.0 mg/dL  Up to 48 hours............<12.0 mg/dL  3-5 days..................<15.0 mg/dL  6-29 days.................<15.0 mg/dL           BUN     93         Calcium     8.2         Chloride     103         CO2     21         Creatinine     2.5         Differential Method     Automated         eGFR     27         Eos #     0.2         Eos %     4.1         Glucose     113         Gran # (ANC)     4.1         Gran %     70.4         Hematocrit     30.6         Hemoglobin     10.0         Immature Grans (Abs)     0.01  Comment: Mild elevation in immature granulocytes is non specific and   can be seen in a variety of conditions including stress response,   acute inflammation,  trauma and pregnancy. Correlation with other   laboratory and clinical findings is essential.           Immature Granulocytes     0.2         Lymph #     1.0         Lymph %     17.4         Magnesium  2.4     1.3         MCH     28.2         MCHC     32.7         MCV     86         Mono #     0.5         Mono %     7.7         MPV     9.9         nRBC     0         Ovalocytes     Occasional         Phosphorus Level     3.3              3.3         Platelet Estimate     Decreased         Platelet Count     122         Poikilocytosis     Slight         Potassium     4.3         PROTEIN TOTAL     5.0         RBC     3.55         RDW     13.6         Sodium     137         Teardrop Cells     Occasional         WBC     5.81                 Significant Imaging: I have reviewed all pertinent imaging results/findings within the past 24 hours.  Assessment/Plan:     * Atrial fibrillation with RVR  Patient has paroxysmal (<7 days) atrial fibrillation. Patient is currently in atrial fibrillation. [id number] Score: 1. The patients heart rate in the last 24 hours is as follows:  Pulse  Min: 56  Max: 159     Antiarrhythmics  amiodarone tablet 200 mg, 2 times daily, Oral  amiodarone tablet 200 mg, Daily, Oral  metoprolol tartrate (LOPRESSOR) tablet 25 mg, Every 6 hours, Oral    Anticoagulants  rivaroxaban tablet 15 mg, With dinner, Oral    Plan  - Replete lytes with a goal of K>4, Mg >2  - Patient is anticoagulated, see medications listed above.  - Patient's afib is currently controlled  - cardiology following        HFrEF (heart failure with reduced ejection fraction)  Patient has Systolic (HFrEF) heart failure that is Acute on chronic. On presentation their CHF was decompensated. Evidence of decompensated CHF on presentation includes: elevated JVD and shortness of breath. The etiology of their decompensation is likely atrial fibrillation . Most recent BNP and echo results are listed below.  Recent Labs     10/29/24  4254   BNP  191*     Latest ECHO  Results for orders placed during the hospital encounter of 10/29/24    Echo    Interpretation Summary    Left Ventricle: The left ventricle is normal in size. Ventricular mass is normal. Mildly increased wall thickness. There is concentric remodeling. There is normal systolic function with a visually estimated ejection fraction of 60 - 65%. Unable to assess diastolic function due to atrial fibrillation.    Right Ventricle: Severe right ventricular enlargement. Wall thickness is normal. Systolic function is moderately reduced.    Right Atrium: Right atrium is severely dilated.    Tricuspid Valve: There is severe regurgitation.    Pulmonic Valve: There is mild regurgitation.    Pulmonary Artery: There is moderate pulmonary hypertension. The estimated pulmonary artery systolic pressure is 59 mmHg.    IVC/SVC: Elevated venous pressure at 15 mmHg.    Current Heart Failure Medications       Plan  - Monitor strict I&Os and daily weights.    - Place on telemetry  - Low sodium diet  - Cardiology has been consulted  - The patient's volume status is improving but not at their baseline as indicated by elevated JVD            Hyperkalemia  Hyperkalemia is likely due to CARINA.The patients most recent potassium results are listed below.  Recent Labs     10/30/24  0545 10/30/24  0908 10/31/24  0436   K 5.1 4.9 4.3     Plan  - Monitor for arrhythmias with EKG and/or continuous telemetry.   - resolved              CARINA (acute kidney injury)  CARINA is likely due to  multiple factors, HF, afib, hypotension . Baseline creatinine is  0.7-1 . Most recent creatinine and eGFR are listed below.  Recent Labs     10/30/24  0545 10/30/24  0908 10/31/24  0436   CREATININE 4.8* 4.7* 2.5*   EGFRNORACEVR 13* 13* 27*      Plan  - CARINA is improving  - Avoid nephrotoxins and renally dose meds for GFR listed above  - Monitor urine output, serial BMP, and adjust therapy as needed  - appreciate nephrology      VTE Risk Mitigation (From  admission, onward)           Ordered     rivaroxaban tablet 15 mg  With dinner         10/31/24 0912                        Thank you for your consult. I will follow-up with patient. Please contact us if you have any additional questions.    Johnna Irizarry MD  Department of Hospital Medicine   [facility] Tele[facility] ([facility])

## 2024-10-31 NOTE — PROGRESS NOTES
"[facility] ([facility])  Critical Care Medicine  Progress Note    Patient Name: Layton Abrams  MRN: [id number]  Admission Date: 10/29/2024  Hospital Length of Stay: 2 days  Code Status: Full Code  Attending Provider: Joe Jennings MD  Primary Care Provider: Nellie Cadena MD   Principal Problem: Atrial fibrillation with RVR    Subjective:     HPI:  68 year old male with known medical issues including HTN; CAD; HFrEF (4/2024 30-40%EF with ungraded diastolic dysfunction and "giant" rt heart; COPD; atrial fib plus DVT earlier this year on xarelto; HCV; prior CVA x 5    Presented to OSH ED on 10/29 with one day history of weakness  Eval revealed atrial fib with RVR; hypotension; CARINA with BUN/Cr 132/5.38 (baseline 0.97) and potassium 7  Treatment included attempted cardioversion (unsuccessful); calcium, dextrose/insulin, lactated Ringer's, IV metoprolol, IV bicarbonate, and Lokelma. Levophed infusion was initiated. PICC line and bustillo catheter were placed    [facility] was contacted for transfer for CARINA/hyperkalemia with potential need nephrology services along with atrial fib with RVR and hypotension    Seen and examined on arrival to [facility] ICU  AAOx3. Atrial fib on monitor with ventricular rate 130-140. Marginal BP with MAP 68  Complains only of intermittent severe RLE "cramping"  STAT follow up labs along with bicarb and amiodarone infusions ordered    Hospital/ICU Course:  10/30/2024: Patient with improving urine ouput, renal function on labs. Continuing fluids, additional bolus given. Requiring Amio and Bashir gtt. Nephro and Cardiology consulted.  10/31/2024: Continued improvement in renal function. RVR worsening, Amio converted to po and Dig ordered. Heparin gtt stopped, home xarelto restarted.    Interval History: RVR on the monitor. Patient denies palpitations, dizziness, chest pain, SOB.    Objective:     Vital Signs (Most Recent):  Temp: 98.1 °F (36.7 °C) (10/31/24 0828)  Pulse: (!) 112 " (10/31/24 0848)  Resp: 16 (10/31/24 0828)  BP: (!) 112/58 (10/31/24 0800)  SpO2: (!) 94 % (10/31/24 0828) Vital Signs (24h Range):  Temp:  [97.2 °F (36.2 °C)-98.8 °F (37.1 °C)] 98.1 °F (36.7 °C)  Pulse:  [] 112  Resp:  [9-34] 16  SpO2:  [73 %-100 %] 94 %  BP: ()/(50-87) 112/58     Weight: 86 kg (189 lb 9.5 oz)  Body mass index is 28 kg/m².      Intake/Output Summary (Last 24 hours) at 10/31/2024 0921  Last data filed at 10/31/2024 0600  Gross per 24 hour   Intake 6217.1 ml   Output 4420 ml   Net 1797.1 ml        Physical Exam  Constitutional:       General: He is not in acute distress.     Appearance: He is ill-appearing.   HENT:      Head: Normocephalic and atraumatic.      Nose: Nose normal.      Mouth/Throat:      Mouth: Mucous membranes are dry.   Eyes:      Pupils: Pupils are equal, round, and reactive to light.   Cardiovascular:      Rate and Rhythm: Tachycardia present. Rhythm irregular.      Pulses: Normal pulses.      Heart sounds: Normal heart sounds.   Pulmonary:      Effort: Pulmonary effort is normal. No respiratory distress.      Breath sounds: Normal breath sounds.   Abdominal:      General: Bowel sounds are normal. There is no distension.      Palpations: Abdomen is soft.   Musculoskeletal:         General: No swelling.      Cervical back: Normal range of motion and neck supple.   Skin:     General: Skin is warm and dry.   Neurological:      General: No focal deficit present.      Mental Status: He is oriented to person, place, and time.           Review of Systems   Respiratory:  Negative for cough, chest tightness and shortness of breath.    Cardiovascular:  Negative for chest pain, palpitations and leg swelling.   Gastrointestinal:  Negative for abdominal pain, constipation, diarrhea and nausea.          Lines/Drains/Airways       Peripherally Inserted Central Catheter Line  Duration             PICC Triple Lumen 10/29/24 1000 right basilic 1 day              Drain  Duration           "        Urethral Catheter 10/30/24 1710 Non-latex;Temperature probe 16 Fr. <1 day              Peripheral Intravenous Line  Duration                  Peripheral IV - Single Lumen 10/29/24 20 G 1 1/4 in Anterior;Left Forearm 2 days         Peripheral IV - Single Lumen 10/29/24 2210 1 day                    Significant Labs:    CBC/Anemia Profile:  Recent Labs   Lab 10/29/24  2329 10/30/24  0545 10/31/24  0436   WBC 8.86 8.87 5.81   HGB 13.8* 13.7* 10.0*   HCT 40.8 40.8 30.6*    218 122*   MCV 83 83 86   RDW 13.6 13.6 13.6        Chemistries:  Recent Labs   Lab 10/29/24  2209 10/30/24  0159 10/30/24  0545 10/30/24  0908 10/31/24  0436      < > 138 137 137   K 6.0*   < > 5.1 4.9 4.3      < > 95 96 103   CO2 22*   < > 28 28 21*   *   < > 144* 134* 93*   CREATININE 5.6*   < > 4.8* 4.7* 2.5*   CALCIUM 10.5   < > 9.9 9.7 8.2*   ALBUMIN  --   --  3.7  --  2.7*  2.7*   PROT  --   --  7.1  --  5.0*   BILITOT  --   --  0.3  --  0.2   ALKPHOS  --   --  48  --  32*   ALT  --   --  13  --  8*   AST  --   --  20  --  16   MG 2.2  --  2.1  --  1.3*   PHOS 5.3*  --  6.0*  --  3.3  3.3    < > = values in this interval not displayed.       All pertinent labs within the past 24 hours have been reviewed.    Significant Imaging:  I have reviewed all pertinent imaging results/findings within the past 24 hours.    ABG  No results for input(s): "PH", "PO2", "PCO2", "HCO3", "BE" in the last 168 hours.  Assessment/Plan:     Pulmonary  Panlobular emphysema  Patient's COPD is controlled currently.  Patient is currently off COPD Pathway. Continue scheduled inhalers  and monitor respiratory status closely.     Cardiac/Vascular  * Atrial fibrillation with RVR  Patient has long standing persistent (>12 months) atrial fibrillation. Patient is currently in atrial fibrillation. [id number] Score: 1. The patients heart rate in the last 24 hours is as follows:  Pulse  Min: 74  Max: 159     Antiarrhythmics  amiodarone tablet " "200 mg, 2 times daily, Oral  amiodarone tablet 200 mg, Daily, Oral    Anticoagulants  rivaroxaban tablet 15 mg, With dinner, Oral    Plan  - Replete lytes with a goal of K>4, Mg >2  - Patient is anticoagulated, home xarelto. Heparin stopped, xarelto restarted.  - Patient's afib is currently uncontrolled. Will add Digoxin  - Cards consulted  - Cardiac monitoring    HFrEF (heart failure with reduced ejection fraction)  EMR reveals outside TTE on 4/11/2024 with EF 30-40%, indeterminant diastolic dysfunction and "giant" right atrium and ventricle  RAUL on 4/12/2024 with EF 40-50%  - currently appears dry on exam with no supplemental oxygen requirement    New Echo with 60-65% EF    Plan -  -strict I/O; daily weights  -IV hydration  -continuous cardiac monitoring    Hypotension  Suspect multifactorial with IVVD and atrial fib with RVR  Plan  - optimize HR control  - IV hydration  - Echo: EF 60-65%    Right Ventricle: Severe right ventricular enlargement. Wall thickness is normal. Systolic function is moderately reduced.    Right Atrium: Right atrium is severely dilated.    Tricuspid Valve: There is severe regurgitation.    Pulmonic Valve: There is mild regurgitation.    Pulmonary Artery: There is moderate pulmonary hypertension. The estimated pulmonary artery systolic pressure is 59 mmHg.    IVC/SVC: Elevated venous pressure at 15 mmHg.  - Hypotension resolved, off Bashir    Renal/  Hyperkalemia  Hyperkalemia is likely due to CARINA.The patients most recent potassium results are listed below.  OSH reported 7.0 potassium at ED presentation    Plan  - s/p treatment with lokelma, bicarb, calcium, dextrose/insulin  - Monitor for arrhythmias with EKG and/or continuous telemetry.   - Monitor potassium: follow up pending now, then serial monitoring pending response to initial therapy    Resolved    CARINA (acute kidney injury)  CARINA is likely due to acute tubular necrosis caused by ischemia related to hypotension . Baseline creatinine is "  0.97 . Most recent creatinine and eGFR are listed below.    OSH reported BUN/Cr 132/5.38 on presentation     Plan  - Avoid nephrotoxins and renally dose meds for GFR listed above  - Monitor urine output, serial BMP, and adjust therapy as needed  - Continue LR maintenance fluids, bolus prn  - Nephro consulted  - US Kidneys with no acute finding  - Maintain Wilcox cath      PUP: Famotidine  DVT ppx: Xarelto    Level III     Critical care was time spent personally by me on the following activities: development of treatment plan with patient or surrogate and bedside caregivers, discussions with consultants, evaluation of patient's response to treatment, examination of patient, ordering and performing treatments and interventions, ordering and review of laboratory studies, ordering and review of radiographic studies, pulse oximetry, re-evaluation of patient's condition. This critical care time did not overlap with that of any other provider or involve time for any procedures.     Makayla Dickey NP  Critical Care Medicine  [facility] ([facility])

## 2024-10-31 NOTE — PROGRESS NOTES
Nephrology Progress Note     History of Present Illness   68-year-old gentleman with multiple medical problems including combined heart failure with left ventricular ejection fraction 40-45% per echocardiogram in May.  He had moderate to severe right ventricular dilation with reduced function then as well and had atrial dilation.  He does have CAD.  He also has history of atrial fibrillation and history of deep venous thrombosis and has been maintained on Xarelto.  He has no history of chronic kidney disease.  Creatinine value runs 0.7-0.8 as noted in June of this year.  He does not have any old urinalyses available in system here.  In June of this year urinalyses performed at [facility] revealed trace proteinuria no other concerning findings.  Review of all labs suggest patient inquired hepatitis-B infection and cleared that spontaneously.  He is noted to have had prior positive hepatitis-C virus antibody with viral load in 2022.  He was apparently remotely treated for hepatitis-C virus infection.     He was admitted here on October 29th (transfer from [facility]) with generalized weakness.  Found to have atrial fibrillation/RVR.  On admission BUN and creatinine were markedly above baseline at 1:32 a.m. and 5.4.  He was hyperkalemic.  He failed attempted cardioversion.  He received usual therapy to address hyperkalemia.  Wilcox catheter was placed.  He has required pressor (Bashir-Synephrine) infusion and admission to ICU.  He was loaded on amiodarone.  Has remained in atrial fibrillation but rate controlled much better.     On day of admission here patient noted to have blood pressures in the 70s and 80s intermittently.  He had excellent urine output on day of admission here and has persisted with good urine output.  This has led to resolution of hyperkalemia and creatinine is improving.  Urinalysis at [facility] revealed trace proteinuria and a moderately concentrated specimen but no  significant hematuria.  Renal ultrasound performed on October 30th unremarkable except for an 8 mm right mid pole renal cyst..         Interval History     Overnight/currently: No acute events overnight. Patient resting comfortably in bed on room air, no shortness of breath or chest pain. Is awake and oriented. Patient no longer on heparin drip, is back on his PO xarelto. He was switched from amiodarone to digoxin which he received this morning however cardiology prefers the patient to be on metoprolol for his RVR. This is appropriate being that he is being treated for CARINA at this time. Creatinine continues to improve, currently 2.5. Patient continues to remain non-oliguric, currently has bustillo catheter. Catheter was exchanged yesterday, urine noted to be pink-tinged. IV fluids still infusing. Blood cultures negative to date.     Health Status   Allergies:    has No Known Allergies.    Current medications:     Current Facility-Administered Medications:     acetaminophen tablet 650 mg, 650 mg, Oral, Q6H PRN, Pili Barcenas, ACNP-BC, 650 mg at 10/30/24 0009    amiodarone tablet 200 mg, 200 mg, Oral, BID, 200 mg at 10/31/24 1027 **FOLLOWED BY** [START ON 11/1/2024] amiodarone tablet 200 mg, 200 mg, Oral, Daily, Joe Jennings MD    arformoteroL nebulizer solution 15 mcg, 15 mcg, Nebulization, BID, 15 mcg at 10/31/24 0828 **AND** Inhalation Treatment BID, , , BID, Pili Barcenas, ACNP-BC    budesonide nebulizer solution 0.5 mg, 0.5 mg, Nebulization, Q12H, Pili Barcenas ACNP-BC, 0.5 mg at 10/31/24 0828    [START ON 11/1/2024] digoxin injection 125 mcg, 125 mcg, Intravenous, Daily, Makayla Dickey NP    famotidine tablet 20 mg, 20 mg, Oral, Daily, Pili Barcenas ACNP-BC, 20 mg at 10/31/24 0847    influenza (adjuvanted) (Fluad) 45 mcg/0.5 mL IM vaccine (> or = 66 yo) 0.5 mL, 0.5 mL, Intramuscular, Prior to discharge, Joe Jennings MD    lactated ringers infusion, , Intravenous, Continuous, Narinder,  "RANJAN Alejandre, Last Rate: 175 mL/hr at 10/31/24 0900, Rate Verify at 10/31/24 0900    mupirocin 2 % ointment, , Nasal, BID, Pili Barcenas ACNP-BC, Given at 10/31/24 0847    ondansetron injection 8 mg, 8 mg, Intravenous, Q6H PRN, Pili Barcenas ACNP-BC    pneumoc 20-haylee conj-dip cr(PF) (PREVNAR-20 (PF)) injection Syrg 0.5 mL, 0.5 mL, Intramuscular, vaccine x 1 dose, Joe Jennings MD    rivaroxaban tablet 15 mg, 15 mg, Oral, Daily with dinner, Joe Jennings MD, 15 mg at 10/31/24 1027    senna-docusate 8.6-50 mg per tablet 1 tablet, 1 tablet, Oral, BID, Makayla Dickey NP     Physical Examination   VS/Measurements   BP (!) 115/58   Pulse (!) 122   Temp 98.1 °F (36.7 °C)   Resp 18   Ht 5' 9" (1.753 m)   Wt 86 kg (189 lb 9.5 oz)   SpO2 (!) 93%   BMI 28.00 kg/m²      General:  Alert and oriented X3, No acute distress.     Neck:  Supple, No lymphadenopathy.     Respiratory:  Lungs are clear to auscultation, Respirations are non-labored, Symmetrical chest wall expansion.    Cardiovascular:  Tachycardia, rhythm irregular. Trace edema BLE  Gastrointestinal:  Soft, Non-tender, Normal bowel sounds.   Genitourinary: Wilcox catheter in place with pink-tinged urine  Integumentary:  Warm, Dry.   Psychiatric:  Cooperative, Appropriate mood & affect.        Review / Management   Laboratory Results   Today's Lab Results :    Recent Results (from the past 24 hours)   Blood culture    Collection Time: 10/30/24  1:57 PM    Specimen: Peripheral, Antecubital, Right; Blood   Result Value Ref Range    Blood Culture, Routine No Growth to date    Blood culture    Collection Time: 10/30/24  2:26 PM    Specimen: Blood   Result Value Ref Range    Blood Culture, Routine No Growth to date    APTT    Collection Time: 10/30/24  5:00 PM   Result Value Ref Range    aPTT 40.6 (H) 21.0 - 32.0 sec   Urinalysis, Reflex to Urine Culture Urine, Catheterized    Collection Time: 10/30/24  5:10 PM    Specimen: Urine   Result " Value Ref Range    Specimen UA Urine, Catheterized     Color, UA Colorless (A) Yellow, Straw, Erica    Appearance, UA Clear Clear    pH, UA 7.0 5.0 - 8.0    Specific Gravity, UA 1.010 1.005 - 1.030    Protein, UA Negative Negative    Glucose, UA Negative Negative    Ketones, UA Negative Negative    Bilirubin (UA) Negative Negative    Occult Blood UA 3+ (A) Negative    Nitrite, UA Negative Negative    Urobilinogen, UA Negative <2.0 EU/dL    Leukocytes, UA 1+ (A) Negative   Urinalysis Microscopic    Collection Time: 10/30/24  5:10 PM   Result Value Ref Range    RBC, UA >100 (H) 0 - 4 /hpf    WBC, UA 1 0 - 5 /hpf    Microscopic Comment SEE COMMENT    APTT    Collection Time: 10/31/24 12:27 AM   Result Value Ref Range    aPTT 41.6 (H) 21.0 - 32.0 sec   Phosphorus    Collection Time: 10/31/24  4:36 AM   Result Value Ref Range    Phosphorus 3.3 2.7 - 4.5 mg/dL   Magnesium    Collection Time: 10/31/24  4:36 AM   Result Value Ref Range    Magnesium 1.3 (L) 1.6 - 2.6 mg/dL   Hepatic Function Panel    Collection Time: 10/31/24  4:36 AM   Result Value Ref Range    Total Protein 5.0 (L) 6.0 - 8.4 g/dL    Albumin 2.7 (L) 3.5 - 5.2 g/dL    Total Bilirubin 0.2 0.1 - 1.0 mg/dL    Bilirubin, Direct <0.1 (A) 0.1 - 0.3 mg/dL    AST 16 10 - 40 U/L    ALT 8 (L) 10 - 44 U/L    Alkaline Phosphatase 32 (L) 40 - 150 U/L   CBC auto differential    Collection Time: 10/31/24  4:36 AM   Result Value Ref Range    WBC 5.81 3.90 - 12.70 K/uL    RBC 3.55 (L) 4.60 - 6.20 M/uL    Hemoglobin 10.0 (L) 14.0 - 18.0 g/dL    Hematocrit 30.6 (L) 40.0 - 54.0 %    MCV 86 82 - 98 fL    MCH 28.2 27.0 - 31.0 pg    MCHC 32.7 32.0 - 36.0 g/dL    RDW 13.6 11.5 - 14.5 %    Platelets 122 (L) 150 - 450 K/uL    MPV 9.9 9.2 - 12.9 fL    Immature Granulocytes 0.2 0.0 - 0.5 %    Gran # (ANC) 4.1 1.8 - 7.7 K/uL    Immature Grans (Abs) 0.01 0.00 - 0.04 K/uL    Lymph # 1.0 1.0 - 4.8 K/uL    Mono # 0.5 0.3 - 1.0 K/uL    Eos # 0.2 0.0 - 0.5 K/uL    Baso # 0.01 0.00 - 0.20 K/uL     nRBC 0 0 /100 WBC    Gran % 70.4 38.0 - 73.0 %    Lymph % 17.4 (L) 18.0 - 48.0 %    Mono % 7.7 4.0 - 15.0 %    Eosinophil % 4.1 0.0 - 8.0 %    Basophil % 0.2 0.0 - 1.9 %    Platelet Estimate Decreased (A)     Aniso Slight     Poik Slight     Ovalocytes Occasional     Tear Drop Cells Occasional     Differential Method Automated    Renal Function Panel    Collection Time: 10/31/24  4:36 AM   Result Value Ref Range    Glucose 113 (H) 70 - 110 mg/dL    Sodium 137 136 - 145 mmol/L    Potassium 4.3 3.5 - 5.1 mmol/L    Chloride 103 95 - 110 mmol/L    CO2 21 (L) 23 - 29 mmol/L    BUN 93 (H) 8 - 23 mg/dL    Calcium 8.2 (L) 8.7 - 10.5 mg/dL    Creatinine 2.5 (H) 0.5 - 1.4 mg/dL    Albumin 2.7 (L) 3.5 - 5.2 g/dL    Phosphorus 3.3 2.7 - 4.5 mg/dL    eGFR 27 (A) >60 mL/min/1.73 m^2    Anion Gap 13 8 - 16 mmol/L   APTT    Collection Time: 10/31/24  6:13 AM   Result Value Ref Range    aPTT 36.0 (H) 21.0 - 32.0 sec        Impression and Plan   Diagnosis   Generalized weakness.  Likely due to atrial fibrillation with RVR.  Improving.     Atrial fibrillation/RVR.  Prior known history of such.  No longer on heparin drip, PO xarelto restarted. Still on amiodarone. Received digoxin x 1 this a.m. but will be switched to metoprolol.      Shock.  Required Bashir-Synephrine on admission.  No longer on pressors     Acute kidney injury.  No known chronic kidney disease.  Nonoliguric and improving with supportive care.  Likely suffered some ATN in setting of renal hypoperfusion.  No other obvious causes.  Improving.  Continue fluids. UA from yesterday showed 3+ blood however patient had bustillo exchange yesterday.      Anemia. Follow H&H    Hyperphosphatemia of acute kidney injury.  Improved today, no changes.     Hypomagnesemia. S/p IV mag sulfate this a.m.    Combined heart failure with significant systolic dysfunction and severe right heart failure. EF 40-45% as of May 2024 per echo.     Reported CAD.     Multiple prior CVAs.       COPD.      Reported history of liver disease.      ______________________________________________  YUSRA Christensen    This document was created using voice recognition software.  It is possible that there are errors which have persisted after original proofreading.  If there is a question regarding contents of this document please contact me for clarification.

## 2024-10-31 NOTE — ASSESSMENT & PLAN NOTE
Hyperkalemia is likely due to CARINA.The patients most recent potassium results are listed below.  Recent Labs     10/30/24  0545 10/30/24  0908 10/31/24  0436   K 5.1 4.9 4.3     Plan  - Monitor for arrhythmias with EKG and/or continuous telemetry.   - resolved

## 2024-10-31 NOTE — ASSESSMENT & PLAN NOTE
Patient has long standing persistent (>12 months) atrial fibrillation. Patient is currently in atrial fibrillation. [id number] Score: 1. The patients heart rate in the last 24 hours is as follows:  Pulse  Min: 74  Max: 159     Antiarrhythmics  amiodarone tablet 200 mg, 2 times daily, Oral  amiodarone tablet 200 mg, Daily, Oral    Anticoagulants  rivaroxaban tablet 15 mg, With dinner, Oral    Plan  - Replete lytes with a goal of K>4, Mg >2  - Patient is anticoagulated, home xarelto. Heparin stopped, xarelto restarted.  - Patient's afib is currently uncontrolled. Will add Digoxin  - Cards consulted  - Cardiac monitoring

## 2024-10-31 NOTE — PHYSICIAN QUERY
Please provide the integumentary diagnosis related to the documentation Wound care RN assessment of the L heel .   Deep Tissue Pressure Injury

## 2024-10-31 NOTE — ASSESSMENT & PLAN NOTE
AFIB now V controlled    Continue amio gtt for rate control  Continue heparin gtt  Ok for gentle hydratiob and maryjo gtt for ARF and ? Septal shock     10/31/24  -Tachycardic this AM, given dose of IV digoxin  -EKG pending  -Amiodarone switched to po   -Off pressor support, Lopressor 25 mg q 6 hours initiated  -On Xarelto for CVA prophylaxis

## 2024-10-31 NOTE — ASSESSMENT & PLAN NOTE
CARINA is likely due to acute tubular necrosis caused by ischemia related to hypotension . Baseline creatinine is  0.97 . Most recent creatinine and eGFR are listed below.    OSH reported BUN/Cr 132/5.38 on presentation     Plan  - Avoid nephrotoxins and renally dose meds for GFR listed above  - Monitor urine output, serial BMP, and adjust therapy as needed  - Continue LR maintenance fluids, bolus prn  - Nephro consulted  - US Kidneys with no acute finding  - Maintain Wilcox cath

## 2024-10-31 NOTE — SUBJECTIVE & OBJECTIVE
Interval History: RVR on the monitor. Patient denies palpitations, dizziness, chest pain, SOB.    Objective:     Vital Signs (Most Recent):  Temp: 98.1 °F (36.7 °C) (10/31/24 0828)  Pulse: (!) 112 (10/31/24 0848)  Resp: 16 (10/31/24 0828)  BP: (!) 112/58 (10/31/24 0800)  SpO2: (!) 94 % (10/31/24 0828) Vital Signs (24h Range):  Temp:  [97.2 °F (36.2 °C)-98.8 °F (37.1 °C)] 98.1 °F (36.7 °C)  Pulse:  [] 112  Resp:  [9-34] 16  SpO2:  [73 %-100 %] 94 %  BP: ()/(50-87) 112/58     Weight: 86 kg (189 lb 9.5 oz)  Body mass index is 28 kg/m².      Intake/Output Summary (Last 24 hours) at 10/31/2024 0921  Last data filed at 10/31/2024 0600  Gross per 24 hour   Intake 6217.1 ml   Output 4420 ml   Net 1797.1 ml        Physical Exam  Constitutional:       General: He is not in acute distress.     Appearance: He is ill-appearing.   HENT:      Head: Normocephalic and atraumatic.      Nose: Nose normal.      Mouth/Throat:      Mouth: Mucous membranes are dry.   Eyes:      Pupils: Pupils are equal, round, and reactive to light.   Cardiovascular:      Rate and Rhythm: Tachycardia present. Rhythm irregular.      Pulses: Normal pulses.      Heart sounds: Normal heart sounds.   Pulmonary:      Effort: Pulmonary effort is normal. No respiratory distress.      Breath sounds: Normal breath sounds.   Abdominal:      General: Bowel sounds are normal. There is no distension.      Palpations: Abdomen is soft.   Musculoskeletal:         General: No swelling.      Cervical back: Normal range of motion and neck supple.   Skin:     General: Skin is warm and dry.   Neurological:      General: No focal deficit present.      Mental Status: He is oriented to person, place, and time.           Review of Systems   Respiratory:  Negative for cough, chest tightness and shortness of breath.    Cardiovascular:  Negative for chest pain, palpitations and leg swelling.   Gastrointestinal:  Negative for abdominal pain, constipation, diarrhea and  nausea.          Lines/Drains/Airways       Peripherally Inserted Central Catheter Line  Duration             PICC Triple Lumen 10/29/24 1000 right basilic 1 day              Drain  Duration                  Urethral Catheter 10/30/24 1710 Non-latex;Temperature probe 16 Fr. <1 day              Peripheral Intravenous Line  Duration                  Peripheral IV - Single Lumen 10/29/24 20 G 1 1/4 in Anterior;Left Forearm 2 days         Peripheral IV - Single Lumen 10/29/24 2210 1 day                    Significant Labs:    CBC/Anemia Profile:  Recent Labs   Lab 10/29/24  2329 10/30/24  0545 10/31/24  0436   WBC 8.86 8.87 5.81   HGB 13.8* 13.7* 10.0*   HCT 40.8 40.8 30.6*    218 122*   MCV 83 83 86   RDW 13.6 13.6 13.6        Chemistries:  Recent Labs   Lab 10/29/24  2209 10/30/24  0159 10/30/24  0545 10/30/24  0908 10/31/24  0436      < > 138 137 137   K 6.0*   < > 5.1 4.9 4.3      < > 95 96 103   CO2 22*   < > 28 28 21*   *   < > 144* 134* 93*   CREATININE 5.6*   < > 4.8* 4.7* 2.5*   CALCIUM 10.5   < > 9.9 9.7 8.2*   ALBUMIN  --   --  3.7  --  2.7*  2.7*   PROT  --   --  7.1  --  5.0*   BILITOT  --   --  0.3  --  0.2   ALKPHOS  --   --  48  --  32*   ALT  --   --  13  --  8*   AST  --   --  20  --  16   MG 2.2  --  2.1  --  1.3*   PHOS 5.3*  --  6.0*  --  3.3  3.3    < > = values in this interval not displayed.       All pertinent labs within the past 24 hours have been reviewed.    Significant Imaging:  I have reviewed all pertinent imaging results/findings within the past 24 hours.

## 2024-10-31 NOTE — ASSESSMENT & PLAN NOTE
CARINA is likely due to  multiple factors, HF, afib, hypotension . Baseline creatinine is  0.7-1 . Most recent creatinine and eGFR are listed below.  Recent Labs     10/30/24  0545 10/30/24  0908 10/31/24  0436   CREATININE 4.8* 4.7* 2.5*   EGFRNORACEVR 13* 13* 27*      Plan  - CARINA is improving  - Avoid nephrotoxins and renally dose meds for GFR listed above  - Monitor urine output, serial BMP, and adjust therapy as needed  - appreciate nephrology

## 2024-10-31 NOTE — PHYSICIAN QUERY
Please provide the integumentary diagnosis related to the documentation by the Wound care RN of a L lower leg wound .   Pressure Injury/Decubitus Ulcer, Stage 3

## 2024-10-31 NOTE — SUBJECTIVE & OBJECTIVE
Past Medical History:   Diagnosis Date    Arthritis     CHF (congestive heart failure)     COPD (chronic obstructive pulmonary disease)     Hypertension     Liver disease     Stroke        No past surgical history on file.    Review of patient's allergies indicates:  No Known Allergies    Current Facility-Administered Medications on File Prior to Encounter   Medication    [DISCONTINUED] GENERIC EXTERNAL MEDICATION     No current outpatient medications on file prior to encounter.     Family History    None       Tobacco Use    Smoking status: Former     Current packs/day: 0.50     Types: Cigarettes    Smokeless tobacco: Never    Tobacco comments:     Quit 2010   Substance and Sexual Activity    Alcohol use: Not Currently    Drug use: Not Currently    Sexual activity: Not on file     Review of Systems   Constitutional:  Positive for activity change and fatigue.   Cardiovascular:  Positive for palpitations.   Neurological:  Positive for weakness.     Objective:     Vital Signs (Most Recent):  Temp: 98.4 °F (36.9 °C) (10/31/24 1636)  Pulse: (!) 115 (10/31/24 1746)  Resp: 18 (10/31/24 1636)  BP: (!) 113/56 (10/31/24 1636)  SpO2: 97 % (10/31/24 1636) Vital Signs (24h Range):  Temp:  [97.4 °F (36.3 °C)-98.8 °F (37.1 °C)] 98.4 °F (36.9 °C)  Pulse:  [] 115  Resp:  [9-34] 18  SpO2:  [87 %-100 %] 97 %  BP: ()/(50-87) 113/56     Weight: 86 kg (189 lb 9.5 oz)  Body mass index is 28 kg/m².     Physical Exam  Constitutional:       Appearance: He is ill-appearing.   HENT:      Head: Normocephalic and atraumatic.   Neck:      Vascular: JVD present.   Cardiovascular:      Rate and Rhythm: Tachycardia present. Rhythm irregular.      Heart sounds: No murmur heard.  Pulmonary:      Effort: Pulmonary effort is normal. No respiratory distress.      Breath sounds: Normal breath sounds. No wheezing.   Abdominal:      General: Bowel sounds are normal. There is no distension.      Palpations: Abdomen is soft.      Tenderness:  There is no abdominal tenderness.   Musculoskeletal:         General: No swelling.   Skin:     General: Skin is warm and dry.   Neurological:      Mental Status: He is alert and oriented to person, place, and time. Mental status is at baseline.          Significant Labs: All pertinent labs within the past 24 hours have been reviewed.  Recent Lab Results         10/31/24  1049   10/31/24  0613   10/31/24  0436   10/31/24  0027        Albumin     2.7              2.7         ALP     32         ALT     8         Anion Gap     13         Aniso     Slight         PTT   36.0  Comment: Refer to local heparin nomogram for intensity/dose specific   therapeutic   range.       41.6  Comment: Refer to local heparin nomogram for intensity/dose specific   therapeutic   range.         AST     16         Baso #     0.01         Basophil %     0.2         Bilirubin Direct     <0.1         BILIRUBIN TOTAL     0.2  Comment: For infants and newborns, interpretation of results should be based  on gestational age, weight and in agreement with clinical  observations.    Premature Infant recommended reference ranges:  Up to 24 hours.............<8.0 mg/dL  Up to 48 hours............<12.0 mg/dL  3-5 days..................<15.0 mg/dL  6-29 days.................<15.0 mg/dL           BUN     93         Calcium     8.2         Chloride     103         CO2     21         Creatinine     2.5         Differential Method     Automated         eGFR     27         Eos #     0.2         Eos %     4.1         Glucose     113         Gran # (ANC)     4.1         Gran %     70.4         Hematocrit     30.6         Hemoglobin     10.0         Immature Grans (Abs)     0.01  Comment: Mild elevation in immature granulocytes is non specific and   can be seen in a variety of conditions including stress response,   acute inflammation, trauma and pregnancy. Correlation with other   laboratory and clinical findings is essential.           Immature Granulocytes      0.2         Lymph #     1.0         Lymph %     17.4         Magnesium  2.4     1.3         MCH     28.2         MCHC     32.7         MCV     86         Mono #     0.5         Mono %     7.7         MPV     9.9         nRBC     0         Ovalocytes     Occasional         Phosphorus Level     3.3              3.3         Platelet Estimate     Decreased         Platelet Count     122         Poikilocytosis     Slight         Potassium     4.3         PROTEIN TOTAL     5.0         RBC     3.55         RDW     13.6         Sodium     137         Teardrop Cells     Occasional         WBC     5.81                 Significant Imaging: I have reviewed all pertinent imaging results/findings within the past 24 hours.

## 2024-10-31 NOTE — PLAN OF CARE
[id number]/[id number] SANTOS Abrams is a 68 y.o.male admitted on 10/29/2024 for Atrial fibrillation with RVR   Code Status: Full Code MRN: [id number]   Review of patient's allergies indicates:  No Known Allergies  Past Medical History:   Diagnosis Date    Arthritis     CHF (congestive heart failure)     COPD (chronic obstructive pulmonary disease)     Hypertension     Liver disease     Stroke       PRN meds    acetaminophen, 650 mg, Q6H PRN  influenza (adjuvanted), 0.5 mL, Prior to discharge  ondansetron, 8 mg, Q6H PRN  pneumoc 20-haylee conj-dip cr(PF), 0.5 mL, vaccine x 1 dose      Chart check completed. Will continue plan of care.        Pt oriented x1 (self).  VSS.  Pt remained afebrile throughout this shift.   All meds administered per order.   Pt remained free of falls this shift.   Plan of care reviewed. Patient verbalizes understanding.   Pt moving/turning x2 assist   Bed low, side rails up x 2, wheels locked, call light in reach.   Patient instructed to call for assistance.  Patient education provided  Will continue to monitor.   Pt has bustillo cath that he arrived with from a previous hospital, ICU replaced it and sent pt to the floor with it. No D/C orders as of now but might want to notified             Orientation: disoriented to, place, time, situation  Hunt Coma Scale Score: 15     Lead Monitored: Lead II Rhythm: normal sinus rhythm Frequency/Ectopy: PVCs, frequent  Cardiac/Telemetry Box Number: [id number]  VTE Core Measure: Pharmacological prophylaxis initiated/maintained Last Bowel Movement: 10/29/24  Diet Renal  Voiding Characteristics: ureteral catheter  Chris Score: 21  Fall Risk Score: 15  Accucheck []   Freq?      Lines/Drains/Airways       Peripherally Inserted Central Catheter Line  Duration             PICC Triple Lumen 10/29/24 1000 right basilic 2 days              Drain  Duration                  Urethral Catheter 10/30/24 1710 Non-latex;Temperature probe 16 Fr. 1 day              Peripheral  Intravenous Line  Duration                  Peripheral IV - Single Lumen 10/29/24 20 G 1 1/4 in Anterior;Left Forearm 2 days

## 2024-10-31 NOTE — ASSESSMENT & PLAN NOTE
Suspect multifactorial with IVVD and atrial fib with RVR  Plan  - optimize HR control  - IV hydration  - Echo: EF 60-65%    Right Ventricle: Severe right ventricular enlargement. Wall thickness is normal. Systolic function is moderately reduced.    Right Atrium: Right atrium is severely dilated.    Tricuspid Valve: There is severe regurgitation.    Pulmonic Valve: There is mild regurgitation.    Pulmonary Artery: There is moderate pulmonary hypertension. The estimated pulmonary artery systolic pressure is 59 mmHg.    IVC/SVC: Elevated venous pressure at 15 mmHg.  - Hypotension resolved, off Bashir

## 2024-10-31 NOTE — ASSESSMENT & PLAN NOTE
Advise to check CVP     Ok to hold ToprolXl and aldactone due to low BP     10/31/24  -Monitor CVP  -TTE with normal EF, moderately reduced RV systolic function, pulm HTN  -BB resumed

## 2024-10-31 NOTE — PLAN OF CARE
Still with intermittent Afib RVRJABIER NP aware, corrected with fluids, lytes corrected.  Blood pressure stable.  Adequate urine output, still pinkish.  Tolerated oral feeds.  No complaints of chestpain.      Problem: Adult Inpatient Plan of Care  Goal: Plan of Care Review  Outcome: Progressing  Goal: Patient-Specific Goal (Individualized)  Outcome: Progressing  Goal: Absence of Hospital-Acquired Illness or Injury  Outcome: Progressing  Goal: Optimal Comfort and Wellbeing  Outcome: Progressing  Goal: Readiness for Transition of Care  Outcome: Progressing     Problem: Wound  Goal: Optimal Coping  Outcome: Progressing  Goal: Optimal Functional Ability  Outcome: Progressing  Goal: Absence of Infection Signs and Symptoms  Outcome: Progressing  Goal: Improved Oral Intake  Outcome: Progressing  Goal: Optimal Pain Control and Function  Outcome: Progressing  Goal: Skin Health and Integrity  Outcome: Progressing  Goal: Optimal Wound Healing  Outcome: Progressing     Problem: Acute Kidney Injury/Impairment  Goal: Fluid and Electrolyte Balance  Outcome: Progressing  Goal: Improved Oral Intake  Outcome: Progressing  Goal: Effective Renal Function  Outcome: Progressing     Problem: Fall Injury Risk  Goal: Absence of Fall and Fall-Related Injury  Outcome: Progressing

## 2024-10-31 NOTE — ASSESSMENT & PLAN NOTE
Patient has Systolic (HFrEF) heart failure that is Acute on chronic. On presentation their CHF was decompensated. Evidence of decompensated CHF on presentation includes: elevated JVD and shortness of breath. The etiology of their decompensation is likely atrial fibrillation . Most recent BNP and echo results are listed below.  Recent Labs     10/29/24  1738   *     Latest ECHO  Results for orders placed during the hospital encounter of 10/29/24    Echo    Interpretation Summary    Left Ventricle: The left ventricle is normal in size. Ventricular mass is normal. Mildly increased wall thickness. There is concentric remodeling. There is normal systolic function with a visually estimated ejection fraction of 60 - 65%. Unable to assess diastolic function due to atrial fibrillation.    Right Ventricle: Severe right ventricular enlargement. Wall thickness is normal. Systolic function is moderately reduced.    Right Atrium: Right atrium is severely dilated.    Tricuspid Valve: There is severe regurgitation.    Pulmonic Valve: There is mild regurgitation.    Pulmonary Artery: There is moderate pulmonary hypertension. The estimated pulmonary artery systolic pressure is 59 mmHg.    IVC/SVC: Elevated venous pressure at 15 mmHg.    Current Heart Failure Medications       Plan  - Monitor strict I&Os and daily weights.    - Place on telemetry  - Low sodium diet  - Cardiology has been consulted  - The patient's volume status is improving but not at their baseline as indicated by elevated JVD

## 2024-10-31 NOTE — PROGRESS NOTES
"[facility] (Hospital)  Cardiology  Progress Note    Patient Name: Layton Abrams  MRN: [id number]  Admission Date: 10/29/2024  Hospital Length of Stay: 2 days  Code Status: Full Code   Attending Physician: Joe Jennings MD   Primary Care Physician: Nellie Cadena MD  Expected Discharge Date:   Principal Problem:Atrial fibrillation with RVR    Subjective:   HPI:  Presented to [facility] ED on 10/29 with one day history of weakness  Eval revealed atrial fib with RVR; hypotension; CARINA with BUN/Cr 132/5.38 (baseline 0.97) and potassium 7  Treatment included attempted cardioversion (unsuccessful); calcium, dextrose/insulin, lactated Ringer's, IV metoprolol, IV bicarbonate, and Lokelma. Levophed infusion was initiated. PICC line and bustillo catheter were placed     [facility] was contacted for transfer for CARINA/hyperkalemia with potential need nephrology services along with atrial fib with RVR and hypotension     Seen and examined on arrival to [facility] ICU  AAOx3. Atrial fib on monitor with ventricular rate 130-140. Marginal BP with MAP 68  Complains only of intermittent severe RLE "cramping"  STAT follow up labs along with bicarb and amiodarone infusions ordered     Cardiology consult for afib with RVR.  PMH chronic afib, HTN; CAD; HFrEF (4/2024 30-40%EF with ungraded diastolic dysfunction and "giant" rt heart; COPD; h/o DVT earlier this year on xarelto; HCV; prior CVA x 5. Prior cardiologist Dr. Cowan.  Admitted for ARF low BP and afib with RVR. Baseline Cr 0.8 and was 5 and improved to 4.7 after IVF  Multiple ulcers and abscess identified by wound care team  Now AFIB VR controlled, BP at 90/60 with nuke gtt   No active bleeding. On heparin gtt.   EKG AFIB RBBB       Hospital Course:   10/31/24-Patient seen and examined today, lying in bed. Appears weak/deconditioned. States he feels well. No CV complaints. Noted to be tachycardic this AM, given dose of IV digoxin with improvement. Off pressor support. Labs " reviewed. Creatinine down to 2.5. Mg 1.3, being repleted. TTE with normal EF, moderately reduced RV function, pulmonary HTN. BB initiated for HR control.        Review of Systems   Constitutional: Positive for malaise/fatigue.   HENT: Negative.     Eyes: Negative.    Cardiovascular: Negative.    Respiratory: Negative.     Endocrine: Negative.    Hematologic/Lymphatic: Negative.    Skin:  Positive for poor wound healing.   Musculoskeletal: Negative.    Gastrointestinal: Negative.    Genitourinary: Negative.    Neurological:  Positive for weakness.   Psychiatric/Behavioral: Negative.     Allergic/Immunologic: Negative.      Objective:     Vital Signs (Most Recent):  Temp: 98.1 °F (36.7 °C) (10/31/24 1100)  Pulse: 101 (10/31/24 1100)  Resp: 18 (10/31/24 1100)  BP: 116/71 (10/31/24 1100)  SpO2: (!) 94 % (10/31/24 1100) Vital Signs (24h Range):  Temp:  [97.2 °F (36.2 °C)-98.8 °F (37.1 °C)] 98.1 °F (36.7 °C)  Pulse:  [] 101  Resp:  [9-34] 18  SpO2:  [73 %-100 %] 94 %  BP: ()/(50-87) 116/71     Weight: 86 kg (189 lb 9.5 oz)  Body mass index is 28 kg/m².     SpO2: (!) 94 %         Intake/Output Summary (Last 24 hours) at 10/31/2024 1153  Last data filed at 10/31/2024 1100  Gross per 24 hour   Intake 7750.29 ml   Output 5145 ml   Net 2605.29 ml       Lines/Drains/Airways       Peripherally Inserted Central Catheter Line  Duration             PICC Triple Lumen 10/29/24 1000 right basilic 2 days              Drain  Duration                  Urethral Catheter 10/30/24 1710 Non-latex;Temperature probe 16 Fr. <1 day              Peripheral Intravenous Line  Duration                  Peripheral IV - Single Lumen 10/29/24 20 G 1 1/4 in Anterior;Left Forearm 2 days                       Physical Exam  Vitals and nursing note reviewed.   Constitutional:       Appearance: He is ill-appearing.   HENT:      Head: Normocephalic and atraumatic.   Eyes:      Pupils: Pupils are equal, round, and reactive to light.   Neck:       Vascular: JVD present.   Cardiovascular:      Rate and Rhythm: Tachycardia present. Rhythm irregularly irregular.      Heart sounds: S1 normal and S2 normal. No murmur heard.  Pulmonary:      Effort: Pulmonary effort is normal.   Abdominal:      Palpations: Abdomen is soft.   Musculoskeletal:      Right lower leg: No edema.      Left lower leg: No edema.   Neurological:      Mental Status: He is oriented to person, place, and time.   Psychiatric:         Mood and Affect: Mood normal.         Behavior: Behavior normal.            Significant Labs: CMP   Recent Labs   Lab 10/29/24  1842 10/29/24  2209 10/30/24  0545 10/30/24  0908 10/31/24  0436      < > 138 137 137   K 5.9*   < > 5.1 4.9 4.3      < > 95 96 103   CO2 23   < > 28 28 21*   GLU  --    < > 94 166* 113*   *   < > 144* 134* 93*   CREATININE 4.97*   < > 4.8* 4.7* 2.5*   CALCIUM 10.7*   < > 9.9 9.7 8.2*   PROT  --   --  7.1  --  5.0*   ALBUMIN  --   --  3.7  --  2.7*  2.7*   BILITOT  --   --  0.3  --  0.2   ALKPHOS  --   --  48  --  32*   AST  --   --  20  --  16   ALT  --   --  13  --  8*   ANIONGAP 9   < > 15 13 13   ESTGFRAFRICA 12  --   --   --   --     < > = values in this interval not displayed.   , CBC   Recent Labs   Lab 10/29/24  2329 10/30/24  0545 10/31/24  0436   WBC 8.86 8.87 5.81   HGB 13.8* 13.7* 10.0*   HCT 40.8 40.8 30.6*    218 122*   , Troponin   Recent Labs   Lab 10/29/24  2209   TROPONINI 0.042*   , and All pertinent lab results from the last 24 hours have been reviewed.    Significant Imaging: Echocardiogram: Transthoracic echo (TTE) complete (Cupid Only):   Results for orders placed or performed during the hospital encounter of 10/29/24   Echo   Result Value Ref Range    BSA 2.01 m2    LVOT stroke volume 38.6 cm3    LVIDd 3.7 3.5 - 6.0 cm    LV Systolic Volume 34.57 mL    LV Systolic Volume Index 17.4 mL/m2    LVIDs 3.0 2.1 - 4.0 cm    LV Diastolic Volume 56.17 mL    LV Diastolic Volume Index 28.23 mL/m2     Left Ventricular End Systolic Volume by Teichholz Method 34.57 mL    Left Ventricular End Diastolic Volume by Teichholz Method 56.17 mL    IVS 1.2 (A) 0.6 - 1.1 cm    LVOT diameter 2.0 cm    LVOT area 3.1 cm2    FS 18.9 (A) 28 - 44 %    Left Ventricle Relative Wall Thickness 0.65 cm    PW 1.2 (A) 0.6 - 1.1 cm    LV mass 147.3 g    LV Mass Index 74.0 g/m2    MV Peak E Jonathon 0.60 m/s    TDI LATERAL 0.15 m/s    TDI SEPTAL 0.16 m/s    E/E' ratio 3.87 m/s    MV Peak A Jonathon 0.48 m/s    TR Max Jonathon 3.31 m/s    E/A ratio 1.25     IVRT 72.31 msec    E wave deceleration time 198.56 msec    LV SEPTAL E/E' RATIO 3.75 m/s    LV LATERAL E/E' RATIO 4.00 m/s    LVOT peak jonathon 0.8 m/s    Left Ventricular Outflow Tract Mean Velocity 0.62 cm/s    Left Ventricular Outflow Tract Mean Gradient 1.70 mmHg    RVOT peak VTI 10.2 cm    TAPSE 1.66 cm    LA size 4.26 cm    Left Atrium Minor Axis 5.76 cm    Left Atrium Major Axis 5.77 cm    RA Major Axis 7.88 cm    AV mean gradient 3.5 mmHg    AV peak gradient 4.8 mmHg    Ao peak jonathon 1.1 m/s    Ao VTI 14.0 cm    LVOT peak VTI 12.3 cm    AV valve area 2.8 cm²    AV Velocity Ratio 0.73     AV index (prosthetic) 0.88     JAJA by Velocity Ratio 2.3 cm²    Mr max jonathon 2.74 m/s    MV stenosis pressure 1/2 time 57.58 ms    MV valve area p 1/2 method 3.82 cm2    Triscuspid Valve Regurgitation Peak Gradient 44 mmHg    PV mean gradient 1 mmHg    RVOT peak jonathon 0.67 m/s    Ao root annulus 2.97 cm    STJ 3.08 cm    Ascending aorta 2.85 cm    IVC diameter 2.85 cm    Mean e' 0.16 m/s    ZLVIDS -1.32     ZLVIDD -4.45     RVDD 4.42 cm    JEREMIE 33.6 mL/m2    LA Vol 66.80 cm3    LA WIDTH 3.2 cm    RA Width 6.3 cm    TV resting pulmonary artery pressure 59 mmHg    RV TB RVSP 18 mmHg    Est. RA pres 15 mmHg    Narrative      Left Ventricle: The left ventricle is normal in size. Ventricular mass   is normal. Mildly increased wall thickness. There is concentric   remodeling. There is normal systolic function with a visually  estimated   ejection fraction of 60 - 65%. Unable to assess diastolic function due to   atrial fibrillation.    Right Ventricle: Severe right ventricular enlargement. Wall thickness   is normal. Systolic function is moderately reduced.    Right Atrium: Right atrium is severely dilated.    Tricuspid Valve: There is severe regurgitation.    Pulmonic Valve: There is mild regurgitation.    Pulmonary Artery: There is moderate pulmonary hypertension. The   estimated pulmonary artery systolic pressure is 59 mmHg.    IVC/SVC: Elevated venous pressure at 15 mmHg.      and EKG: Reviewed  Assessment and Plan:   Patient who presents with afib with RVR/CARINA/hypotension. Improving. Off pressor support. Meds adjusted for HR control. Monitor CVP, avoid fluid overload.    * Atrial fibrillation with RVR  AFIB now V controlled    Continue amio gtt for rate control  Continue heparin gtt  Ok for gentle hydratiob and maryjo gtt for ARF and ? Septal shock     10/31/24  -Tachycardic this AM, given dose of IV digoxin  -EKG pending  -Amiodarone switched to po   -Off pressor support, Lopressor 25 mg q 6 hours initiated  -On Xarelto for CVA prophylaxis       HFrEF (heart failure with reduced ejection fraction)  Advise to check CVP     Ok to hold ToprolXl and aldactone due to low BP     10/31/24  -Monitor CVP  -TTE with normal EF, moderately reduced RV systolic function, pulm HTN  -BB resumed      Hypotension  Continue IVF    10/31/24  -Improved, off pressor support    Hyperkalemia  Now K normalized    CARINA (acute kidney injury)  On IVF    10/31/24  -Improved to 2.5, nephrology following        VTE Risk Mitigation (From admission, onward)           Ordered     rivaroxaban tablet 15 mg  With dinner         10/31/24 0912                    Meredith Rojas PA-C  Cardiology  [facility] ([facility]

## 2024-10-31 NOTE — HOSPITAL COURSE
"Cardiology consult for afib with RVR.  PMH chronic afib, HTN; CAD; HFrEF (4/2024 30-40%EF with ungraded diastolic dysfunction and "giant" rt heart; COPD; h/o DVT earlier this year on xarelto; HCV; prior CVA x 5. Prior cardiologist Dr. Cowan.  Admitted for ARF low BP and afib with RVR. Baseline Cr 0.8 and was 5 and improved to 4.7 after IVF  Multiple ulcers and abscess identified by wound care team  Now AFIB VR controlled, BP at 90/60 with nuke gtt   No active bleeding. On heparin gtt.   EKG AFIB RBBB    10/31/24-Patient seen and examined today, lying in bed. Appears weak/deconditioned. States he feels well. No CV complaints. Noted to be tachycardic this AM, given dose of IV digoxin with improvement. Off pressor support. Labs reviewed. Creatinine down to 2.5. Mg 1.3, being repleted. TTE with normal EF, moderately reduced RV function, pulmonary HTN. BB initiated for HR control.    11/1/24-Patient seen and examined today. Awake, alert. States he feels well. Complains of BLE pain. Appears to have converted to SR, EKG pending. Labs reviewed. Creatinine down to 2.1.     11/02/2024  On SR and frequent PACs, improved SOB. No abd pain Cr 2.1 stable I&O -3.5 liters since admission    11/03/2024  On SR, sob improved, Cr 1.7 improved, LE arterial US showed significant PAD, right midSFA occluded    "

## 2024-10-31 NOTE — ASSESSMENT & PLAN NOTE
Patient has paroxysmal (<7 days) atrial fibrillation. Patient is currently in atrial fibrillation. [id number] Score: 1. The patients heart rate in the last 24 hours is as follows:  Pulse  Min: 56  Max: 159     Antiarrhythmics  amiodarone tablet 200 mg, 2 times daily, Oral  amiodarone tablet 200 mg, Daily, Oral  metoprolol tartrate (LOPRESSOR) tablet 25 mg, Every 6 hours, Oral    Anticoagulants  rivaroxaban tablet 15 mg, With dinner, Oral    Plan  - Replete lytes with a goal of K>4, Mg >2  - Patient is anticoagulated, see medications listed above.  - Patient's afib is currently controlled  - cardiology following

## 2024-10-31 NOTE — ASSESSMENT & PLAN NOTE
Hyperkalemia is likely due to CARINA.The patients most recent potassium results are listed below.  OSH reported 7.0 potassium at ED presentation    Plan  - s/p treatment with lokelma, bicarb, calcium, dextrose/insulin  - Monitor for arrhythmias with EKG and/or continuous telemetry.   - Monitor potassium: follow up pending now, then serial monitoring pending response to initial therapy    Resolved

## 2024-10-31 NOTE — HPI
The patient is 68-year-old male with a history of atrial fibrillation, coronary artery disease with stenting, hypertension, stroke with right-sided deficits, COPD, HCV, left peroneal DVT, Xarelto use, and lower extremity cellulitis presented to [facility] Emergency Department on October 29 with weakness that began on the morning of presentation.  By report he had been feeling well the day prior but did not feel well on October 29.  In the emergency department he had AFib with RVR and hypotension, but oxygenation was stable on room air.  Patient was drowsy but would wake and speak without difficulty.  He reported no chest pain or shortness of breath.  Labs were concerning for CARINA with  and creatinine 5.38.  Potassium was 7.  High sensitivity troponin was in the normal range at 40.  Chest x-ray had clear lungs but did show marked cardiomegaly.  ED attempted cardioversion with the AFib with RVR, but the patient did not cardiovert.  By report, EKGs did not show evidence of significant peak T-waves. He received calcium, dextrose/insulin, lactated Ringer's, IV metoprolol, IV bicarbonate, and Lokelma.  Levophed infusion was initiated.  Blood pressure improved with the Levophed, but tachycardia persisted.  Patient has a PICC line in place.  He also had a Wilcox catheter placed and had a small amount of urine with catheter placement.  Case discussed with the ED doctor and with Critical Care Medicine at [facility].  Patient transfer to [facility] and admitted to the ICU. He was placed on bicarb and amiodarone infusions.

## 2024-10-31 NOTE — SUBJECTIVE & OBJECTIVE
Review of Systems   Constitutional: Positive for malaise/fatigue.   HENT: Negative.     Eyes: Negative.    Cardiovascular: Negative.    Respiratory: Negative.     Endocrine: Negative.    Hematologic/Lymphatic: Negative.    Skin:  Positive for poor wound healing.   Musculoskeletal: Negative.    Gastrointestinal: Negative.    Genitourinary: Negative.    Neurological:  Positive for weakness.   Psychiatric/Behavioral: Negative.     Allergic/Immunologic: Negative.      Objective:     Vital Signs (Most Recent):  Temp: 98.1 °F (36.7 °C) (10/31/24 1100)  Pulse: 101 (10/31/24 1100)  Resp: 18 (10/31/24 1100)  BP: 116/71 (10/31/24 1100)  SpO2: (!) 94 % (10/31/24 1100) Vital Signs (24h Range):  Temp:  [97.2 °F (36.2 °C)-98.8 °F (37.1 °C)] 98.1 °F (36.7 °C)  Pulse:  [] 101  Resp:  [9-34] 18  SpO2:  [73 %-100 %] 94 %  BP: ()/(50-87) 116/71     Weight: 86 kg (189 lb 9.5 oz)  Body mass index is 28 kg/m².     SpO2: (!) 94 %         Intake/Output Summary (Last 24 hours) at 10/31/2024 1153  Last data filed at 10/31/2024 1100  Gross per 24 hour   Intake 7750.29 ml   Output 5145 ml   Net 2605.29 ml       Lines/Drains/Airways       Peripherally Inserted Central Catheter Line  Duration             PICC Triple Lumen 10/29/24 1000 right basilic 2 days              Drain  Duration                  Urethral Catheter 10/30/24 1710 Non-latex;Temperature probe 16 Fr. <1 day              Peripheral Intravenous Line  Duration                  Peripheral IV - Single Lumen 10/29/24 20 G 1 1/4 in Anterior;Left Forearm 2 days                       Physical Exam  Vitals and nursing note reviewed.   Constitutional:       Appearance: He is ill-appearing.   HENT:      Head: Normocephalic and atraumatic.   Eyes:      Pupils: Pupils are equal, round, and reactive to light.   Neck:      Vascular: JVD present.   Cardiovascular:      Rate and Rhythm: Tachycardia present. Rhythm irregularly irregular.      Heart sounds: S1 normal and S2 normal.  No murmur heard.  Pulmonary:      Effort: Pulmonary effort is normal.   Abdominal:      Palpations: Abdomen is soft.   Musculoskeletal:      Right lower leg: No edema.      Left lower leg: No edema.   Neurological:      Mental Status: He is oriented to person, place, and time.   Psychiatric:         Mood and Affect: Mood normal.         Behavior: Behavior normal.            Significant Labs: CMP   Recent Labs   Lab 10/29/24  1842 10/29/24  2209 10/30/24  0545 10/30/24  0908 10/31/24  0436      < > 138 137 137   K 5.9*   < > 5.1 4.9 4.3      < > 95 96 103   CO2 23   < > 28 28 21*   GLU  --    < > 94 166* 113*   *   < > 144* 134* 93*   CREATININE 4.97*   < > 4.8* 4.7* 2.5*   CALCIUM 10.7*   < > 9.9 9.7 8.2*   PROT  --   --  7.1  --  5.0*   ALBUMIN  --   --  3.7  --  2.7*  2.7*   BILITOT  --   --  0.3  --  0.2   ALKPHOS  --   --  48  --  32*   AST  --   --  20  --  16   ALT  --   --  13  --  8*   ANIONGAP 9   < > 15 13 13   ESTGFRAFRICA 12  --   --   --   --     < > = values in this interval not displayed.   , CBC   Recent Labs   Lab 10/29/24  2329 10/30/24  0545 10/31/24  0436   WBC 8.86 8.87 5.81   HGB 13.8* 13.7* 10.0*   HCT 40.8 40.8 30.6*    218 122*   , Troponin   Recent Labs   Lab 10/29/24  2209   TROPONINI 0.042*   , and All pertinent lab results from the last 24 hours have been reviewed.    Significant Imaging: Echocardiogram: Transthoracic echo (TTE) complete (Cupid Only):   Results for orders placed or performed during the hospital encounter of 10/29/24   Echo   Result Value Ref Range    BSA 2.01 m2    LVOT stroke volume 38.6 cm3    LVIDd 3.7 3.5 - 6.0 cm    LV Systolic Volume 34.57 mL    LV Systolic Volume Index 17.4 mL/m2    LVIDs 3.0 2.1 - 4.0 cm    LV Diastolic Volume 56.17 mL    LV Diastolic Volume Index 28.23 mL/m2    Left Ventricular End Systolic Volume by Teichholz Method 34.57 mL    Left Ventricular End Diastolic Volume by Teichholz Method 56.17 mL    IVS 1.2 (A) 0.6 - 1.1  cm    LVOT diameter 2.0 cm    LVOT area 3.1 cm2    FS 18.9 (A) 28 - 44 %    Left Ventricle Relative Wall Thickness 0.65 cm    PW 1.2 (A) 0.6 - 1.1 cm    LV mass 147.3 g    LV Mass Index 74.0 g/m2    MV Peak E Jonathon 0.60 m/s    TDI LATERAL 0.15 m/s    TDI SEPTAL 0.16 m/s    E/E' ratio 3.87 m/s    MV Peak A Jonathon 0.48 m/s    TR Max Jonathon 3.31 m/s    E/A ratio 1.25     IVRT 72.31 msec    E wave deceleration time 198.56 msec    LV SEPTAL E/E' RATIO 3.75 m/s    LV LATERAL E/E' RATIO 4.00 m/s    LVOT peak jonathon 0.8 m/s    Left Ventricular Outflow Tract Mean Velocity 0.62 cm/s    Left Ventricular Outflow Tract Mean Gradient 1.70 mmHg    RVOT peak VTI 10.2 cm    TAPSE 1.66 cm    LA size 4.26 cm    Left Atrium Minor Axis 5.76 cm    Left Atrium Major Axis 5.77 cm    RA Major Axis 7.88 cm    AV mean gradient 3.5 mmHg    AV peak gradient 4.8 mmHg    Ao peak jonathon 1.1 m/s    Ao VTI 14.0 cm    LVOT peak VTI 12.3 cm    AV valve area 2.8 cm²    AV Velocity Ratio 0.73     AV index (prosthetic) 0.88     JAJA by Velocity Ratio 2.3 cm²    Mr max jonathon 2.74 m/s    MV stenosis pressure 1/2 time 57.58 ms    MV valve area p 1/2 method 3.82 cm2    Triscuspid Valve Regurgitation Peak Gradient 44 mmHg    PV mean gradient 1 mmHg    RVOT peak jonathon 0.67 m/s    Ao root annulus 2.97 cm    STJ 3.08 cm    Ascending aorta 2.85 cm    IVC diameter 2.85 cm    Mean e' 0.16 m/s    ZLVIDS -1.32     ZLVIDD -4.45     RVDD 4.42 cm    JEREMIE 33.6 mL/m2    LA Vol 66.80 cm3    LA WIDTH 3.2 cm    RA Width 6.3 cm    TV resting pulmonary artery pressure 59 mmHg    RV TB RVSP 18 mmHg    Est. RA pres 15 mmHg    Narrative      Left Ventricle: The left ventricle is normal in size. Ventricular mass   is normal. Mildly increased wall thickness. There is concentric   remodeling. There is normal systolic function with a visually estimated   ejection fraction of 60 - 65%. Unable to assess diastolic function due to   atrial fibrillation.    Right Ventricle: Severe right ventricular  enlargement. Wall thickness   is normal. Systolic function is moderately reduced.    Right Atrium: Right atrium is severely dilated.    Tricuspid Valve: There is severe regurgitation.    Pulmonic Valve: There is mild regurgitation.    Pulmonary Artery: There is moderate pulmonary hypertension. The   estimated pulmonary artery systolic pressure is 59 mmHg.    IVC/SVC: Elevated venous pressure at 15 mmHg.      and EKG: Reviewed

## 2024-11-01 LAB
ALBUMIN SERPL BCP-MCNC: 2.9 G/DL (ref 3.5–5.2)
ALP SERPL-CCNC: 37 U/L (ref 40–150)
ALT SERPL W/O P-5'-P-CCNC: 10 U/L (ref 10–44)
ANION GAP SERPL CALC-SCNC: 8 MMOL/L (ref 8–16)
AST SERPL-CCNC: 24 U/L (ref 10–40)
BASOPHILS # BLD AUTO: 0.01 K/UL (ref 0–0.2)
BASOPHILS NFR BLD: 0.1 % (ref 0–1.9)
BILIRUB SERPL-MCNC: 0.4 MG/DL (ref 0.1–1)
BUN SERPL-MCNC: 62 MG/DL (ref 8–23)
CALCIUM SERPL-MCNC: 8.7 MG/DL (ref 8.7–10.5)
CHLORIDE SERPL-SCNC: 106 MMOL/L (ref 95–110)
CO2 SERPL-SCNC: 23 MMOL/L (ref 23–29)
CREAT SERPL-MCNC: 2.1 MG/DL (ref 0.5–1.4)
DIFFERENTIAL METHOD BLD: ABNORMAL
EOSINOPHIL # BLD AUTO: 0.2 K/UL (ref 0–0.5)
EOSINOPHIL NFR BLD: 2.8 % (ref 0–8)
ERYTHROCYTE [DISTWIDTH] IN BLOOD BY AUTOMATED COUNT: 13.9 % (ref 11.5–14.5)
EST. GFR  (NO RACE VARIABLE): 34 ML/MIN/1.73 M^2
GLUCOSE SERPL-MCNC: 87 MG/DL (ref 70–110)
HCT VFR BLD AUTO: 33.2 % (ref 40–54)
HGB BLD-MCNC: 10.8 G/DL (ref 14–18)
IMM GRANULOCYTES # BLD AUTO: 0.04 K/UL (ref 0–0.04)
IMM GRANULOCYTES NFR BLD AUTO: 0.6 % (ref 0–0.5)
LYMPHOCYTES # BLD AUTO: 1 K/UL (ref 1–4.8)
LYMPHOCYTES NFR BLD: 14 % (ref 18–48)
MAGNESIUM SERPL-MCNC: 1.7 MG/DL (ref 1.6–2.6)
MCH RBC QN AUTO: 27.8 PG (ref 27–31)
MCHC RBC AUTO-ENTMCNC: 32.5 G/DL (ref 32–36)
MCV RBC AUTO: 86 FL (ref 82–98)
MONOCYTES # BLD AUTO: 0.6 K/UL (ref 0.3–1)
MONOCYTES NFR BLD: 8.8 % (ref 4–15)
NEUTROPHILS # BLD AUTO: 5 K/UL (ref 1.8–7.7)
NEUTROPHILS NFR BLD: 73.7 % (ref 38–73)
NRBC BLD-RTO: 0 /100 WBC
PHOSPHATE SERPL-MCNC: 3.4 MG/DL (ref 2.7–4.5)
PLATELET # BLD AUTO: 121 K/UL (ref 150–450)
PMV BLD AUTO: 9.5 FL (ref 9.2–12.9)
POTASSIUM SERPL-SCNC: 4.9 MMOL/L (ref 3.5–5.1)
PROT SERPL-MCNC: 5.7 G/DL (ref 6–8.4)
RBC # BLD AUTO: 3.88 M/UL (ref 4.6–6.2)
SODIUM SERPL-SCNC: 137 MMOL/L (ref 136–145)
WBC # BLD AUTO: 6.84 K/UL (ref 3.9–12.7)

## 2024-11-01 PROCEDURE — 25000003 PHARM REV CODE 250: Performed by: SPECIALIST

## 2024-11-01 PROCEDURE — 99233 SBSQ HOSP IP/OBS HIGH 50: CPT | Mod: ,,, | Performed by: INTERNAL MEDICINE

## 2024-11-01 PROCEDURE — 25000003 PHARM REV CODE 250: Performed by: PHYSICIAN ASSISTANT

## 2024-11-01 PROCEDURE — 97162 PT EVAL MOD COMPLEX 30 MIN: CPT

## 2024-11-01 PROCEDURE — 99232 SBSQ HOSP IP/OBS MODERATE 35: CPT | Mod: ,,, | Performed by: INTERNAL MEDICINE

## 2024-11-01 PROCEDURE — 83735 ASSAY OF MAGNESIUM: CPT | Performed by: NURSE PRACTITIONER

## 2024-11-01 PROCEDURE — 25000242 PHARM REV CODE 250 ALT 637 W/ HCPCS: Performed by: NURSE PRACTITIONER

## 2024-11-01 PROCEDURE — 93005 ELECTROCARDIOGRAM TRACING: CPT

## 2024-11-01 PROCEDURE — 25000003 PHARM REV CODE 250: Performed by: NURSE PRACTITIONER

## 2024-11-01 PROCEDURE — 84100 ASSAY OF PHOSPHORUS: CPT | Performed by: NURSE PRACTITIONER

## 2024-11-01 PROCEDURE — 63600175 PHARM REV CODE 636 W HCPCS: Performed by: INTERNAL MEDICINE

## 2024-11-01 PROCEDURE — 21400001 HC TELEMETRY ROOM

## 2024-11-01 PROCEDURE — 80053 COMPREHEN METABOLIC PANEL: CPT | Performed by: NURSE PRACTITIONER

## 2024-11-01 PROCEDURE — 97530 THERAPEUTIC ACTIVITIES: CPT

## 2024-11-01 PROCEDURE — 93010 ELECTROCARDIOGRAM REPORT: CPT | Mod: ,,, | Performed by: INTERNAL MEDICINE

## 2024-11-01 PROCEDURE — 85025 COMPLETE CBC W/AUTO DIFF WBC: CPT | Performed by: NURSE PRACTITIONER

## 2024-11-01 RX ADMIN — ARFORMOTEROL TARTRATE 15 MCG: 15 SOLUTION RESPIRATORY (INHALATION) at 07:11

## 2024-11-01 RX ADMIN — MUPIROCIN: 20 OINTMENT TOPICAL at 09:11

## 2024-11-01 RX ADMIN — METOPROLOL TARTRATE 25 MG: 25 TABLET, FILM COATED ORAL at 11:11

## 2024-11-01 RX ADMIN — MUPIROCIN: 20 OINTMENT TOPICAL at 10:11

## 2024-11-01 RX ADMIN — METOPROLOL TARTRATE 25 MG: 25 TABLET, FILM COATED ORAL at 01:11

## 2024-11-01 RX ADMIN — SODIUM CHLORIDE, POTASSIUM CHLORIDE, SODIUM LACTATE AND CALCIUM CHLORIDE: 600; 310; 30; 20 INJECTION, SOLUTION INTRAVENOUS at 06:11

## 2024-11-01 RX ADMIN — SENNOSIDES AND DOCUSATE SODIUM 1 TABLET: 50; 8.6 TABLET ORAL at 10:11

## 2024-11-01 RX ADMIN — RIVAROXABAN 15 MG: 15 TABLET, FILM COATED ORAL at 06:11

## 2024-11-01 RX ADMIN — SENNOSIDES AND DOCUSATE SODIUM 1 TABLET: 50; 8.6 TABLET ORAL at 09:11

## 2024-11-01 RX ADMIN — AMIODARONE HYDROCHLORIDE 200 MG: 200 TABLET ORAL at 10:11

## 2024-11-01 RX ADMIN — METOPROLOL TARTRATE 25 MG: 25 TABLET, FILM COATED ORAL at 05:11

## 2024-11-01 RX ADMIN — METOPROLOL TARTRATE 25 MG: 25 TABLET, FILM COATED ORAL at 06:11

## 2024-11-01 RX ADMIN — SODIUM CHLORIDE, POTASSIUM CHLORIDE, SODIUM LACTATE AND CALCIUM CHLORIDE: 600; 310; 30; 20 INJECTION, SOLUTION INTRAVENOUS at 03:11

## 2024-11-01 RX ADMIN — FAMOTIDINE 20 MG: 20 TABLET ORAL at 10:11

## 2024-11-01 RX ADMIN — BUDESONIDE INHALATION 0.5 MG: 0.5 SUSPENSION RESPIRATORY (INHALATION) at 07:11

## 2024-11-01 NOTE — ASSESSMENT & PLAN NOTE
CARINA is likely due to  multiple factors, HF, afib, hypotension . Baseline creatinine is  0.7-1 . Most recent creatinine and eGFR are listed below.  Recent Labs     10/30/24  0908 10/31/24  0436 11/01/24  0420   CREATININE 4.7* 2.5* 2.1*   EGFRNORACEVR 13* 27* 34*        Plan  - CARINA is improving  - Avoid nephrotoxins and renally dose meds for GFR listed above  - Monitor urine output, serial BMP, and adjust therapy as needed  - appreciate nephrology

## 2024-11-01 NOTE — PROGRESS NOTES
[facility] Medicine  Progress Note    Patient Name: Layton Abrams  MRN: [id number]  Patient Class: IP- Inpatient   Admission Date: 10/29/2024  Length of Stay: 3 days  Attending Physician: Johnna Irizarry MD  Primary Care Provider: Nellie Cadena MD        Subjective:     Principal Problem:Atrial fibrillation with RVR        HPI:  The patient is 68-year-old male with a history of atrial fibrillation, coronary artery disease with stenting, hypertension, stroke with right-sided deficits, COPD, HCV, left peroneal DVT, Xarelto use, and lower extremity cellulitis presented to [facility] Emergency Department on October 29 with weakness that began on the morning of presentation.  By report he had been feeling well the day prior but did not feel well on October 29.  In the emergency department he had AFib with RVR and hypotension, but oxygenation was stable on room air.  Patient was drowsy but would wake and speak without difficulty.  He reported no chest pain or shortness of breath.  Labs were concerning for CARINA with  and creatinine 5.38.  Potassium was 7.  High sensitivity troponin was in the normal range at 40.  Chest x-ray had clear lungs but did show marked cardiomegaly.  ED attempted cardioversion with the AFib with RVR, but the patient did not cardiovert.  By report, EKGs did not show evidence of significant peak T-waves. He received calcium, dextrose/insulin, lactated Ringer's, IV metoprolol, IV bicarbonate, and Lokelma.  Levophed infusion was initiated.  Blood pressure improved with the Levophed, but tachycardia persisted.  Patient has a PICC line in place.  He also had a Wilcox catheter placed and had a small amount of urine with catheter placement.  Case discussed with the ED doctor and with Critical Care Medicine at [facility].  Patient transfer to [facility] and admitted to the ICU. He was placed on bicarb and amiodarone infusions.         Overview/Hospital Course:  No notes on file    Interval History: f/u leg pain patient reporting pain with movement of legs. Discussed with cardiology plan arterial US to rule out PAD. Converted to sinus    Review of Systems  Objective:     Vital Signs (Most Recent):  Temp: 99.7 °F (37.6 °C) (11/01/24 1559)  Pulse: 77 (11/01/24 1559)  Resp: 18 (11/01/24 1559)  BP: 102/61 (11/01/24 1559)  SpO2: (!) 94 % (11/01/24 1559) Vital Signs (24h Range):  Temp:  [98 °F (36.7 °C)-99.7 °F (37.6 °C)] 99.7 °F (37.6 °C)  Pulse:  [] 77  Resp:  [16-20] 18  SpO2:  [94 %-97 %] 94 %  BP: (102-143)/(56-87) 102/61     Weight: 86 kg (189 lb 9.5 oz)  Body mass index is 28 kg/m².    Intake/Output Summary (Last 24 hours) at 11/1/2024 1633  Last data filed at 11/1/2024 1026  Gross per 24 hour   Intake 200 ml   Output 3150 ml   Net -2950 ml         Physical Exam  HENT:      Head: Normocephalic and atraumatic.   Cardiovascular:      Rate and Rhythm: Normal rate and regular rhythm.      Heart sounds: No murmur heard.  Pulmonary:      Effort: Pulmonary effort is normal. No respiratory distress.      Breath sounds: Normal breath sounds. No wheezing.   Abdominal:      General: Bowel sounds are normal. There is no distension.      Palpations: Abdomen is soft.      Tenderness: There is no abdominal tenderness.   Musculoskeletal:         General: No swelling.   Skin:     General: Skin is warm and dry.   Neurological:      Mental Status: He is alert and oriented to person, place, and time. Mental status is at baseline.             Significant Labs: All pertinent labs within the past 24 hours have been reviewed.  Recent Lab Results         11/01/24  0420        Albumin 2.9       ALP 37       ALT 10       Anion Gap 8       AST 24       Baso # 0.01       Basophil % 0.1       BILIRUBIN TOTAL 0.4  Comment: For infants and newborns, interpretation of results should be based  on gestational age, weight and in agreement with  clinical  observations.    Premature Infant recommended reference ranges:  Up to 24 hours.............<8.0 mg/dL  Up to 48 hours............<12.0 mg/dL  3-5 days..................<15.0 mg/dL  6-29 days.................<15.0 mg/dL         BUN 62       Calcium 8.7       Chloride 106       CO2 23       Creatinine 2.1       Differential Method Automated       eGFR 34       Eos # 0.2       Eos % 2.8       Glucose 87       Gran # (ANC) 5.0       Gran % 73.7       Hematocrit 33.2       Hemoglobin 10.8       Immature Grans (Abs) 0.04  Comment: Mild elevation in immature granulocytes is non specific and   can be seen in a variety of conditions including stress response,   acute inflammation, trauma and pregnancy. Correlation with other   laboratory and clinical findings is essential.         Immature Granulocytes 0.6       Lymph # 1.0       Lymph % 14.0       Magnesium  1.7       MCH 27.8       MCHC 32.5       MCV 86       Mono # 0.6       Mono % 8.8       MPV 9.5       nRBC 0       Phosphorus Level 3.4       Platelet Count 121       Potassium 4.9       PROTEIN TOTAL 5.7       RBC 3.88       RDW 13.9       Sodium 137       WBC 6.84               Significant Imaging: I have reviewed all pertinent imaging results/findings within the past 24 hours.    Assessment/Plan:      * Atrial fibrillation with RVR  Patient has paroxysmal (<7 days) atrial fibrillation. Patient is currently in sinus rhythm. [id number] Score: 1. The patients heart rate in the last 24 hours is as follows:  Pulse  Min: 61  Max: 125     Antiarrhythmics  amiodarone tablet 200 mg, Daily, Oral  metoprolol tartrate (LOPRESSOR) tablet 25 mg, Every 6 hours, Oral    Anticoagulants  rivaroxaban tablet 15 mg, With dinner, Oral    Plan  - Replete lytes with a goal of K>4, Mg >2  - Patient is anticoagulated, see medications listed above.  - Patient's afib is currently controlled  - cardiology following        HFrEF (heart failure with reduced ejection fraction)  Patient  has Systolic (HFrEF) heart failure that is Acute on chronic. On presentation their CHF was decompensated. Evidence of decompensated CHF on presentation includes: elevated JVD and shortness of breath. The etiology of their decompensation is likely atrial fibrillation . Most recent BNP and echo results are listed below.  Recent Labs     10/29/24  1738   *     Latest ECHO  Results for orders placed during the hospital encounter of 10/29/24    Echo    Interpretation Summary    Left Ventricle: The left ventricle is normal in size. Ventricular mass is normal. Mildly increased wall thickness. There is concentric remodeling. There is normal systolic function with a visually estimated ejection fraction of 60 - 65%. Unable to assess diastolic function due to atrial fibrillation.    Right Ventricle: Severe right ventricular enlargement. Wall thickness is normal. Systolic function is moderately reduced.    Right Atrium: Right atrium is severely dilated.    Tricuspid Valve: There is severe regurgitation.    Pulmonic Valve: There is mild regurgitation.    Pulmonary Artery: There is moderate pulmonary hypertension. The estimated pulmonary artery systolic pressure is 59 mmHg.    IVC/SVC: Elevated venous pressure at 15 mmHg.    Current Heart Failure Medications       Plan  - Monitor strict I&Os and daily weights.    - Place on telemetry  - Low sodium diet  - Cardiology has been consulted  - The patient's volume status is improving but not at their baseline as indicated by elevated JVD            Hyperkalemia  Hyperkalemia is likely due to CARINA.The patients most recent potassium results are listed below.  Recent Labs     10/30/24  0908 10/31/24  0436 11/01/24  0420   K 4.9 4.3 4.9       Plan  - Monitor for arrhythmias with EKG and/or continuous telemetry.   - resolved              CARINA (acute kidney injury)  CARINA is likely due to  multiple factors, HF, afib, hypotension . Baseline creatinine is  0.7-1 . Most recent creatinine and  eGFR are listed below.  Recent Labs     10/30/24  0908 10/31/24  0436 11/01/24  0420   CREATININE 4.7* 2.5* 2.1*   EGFRNORACEVR 13* 27* 34*        Plan  - CARINA is improving  - Avoid nephrotoxins and renally dose meds for GFR listed above  - Monitor urine output, serial BMP, and adjust therapy as needed  - appreciate nephrology      VTE Risk Mitigation (From admission, onward)           Ordered     rivaroxaban tablet 15 mg  With dinner         10/31/24 0912                    Discharge Planning   WIL:      Code Status: Full Code   Is the patient medically ready for discharge?:     Reason for patient still in hospital (select all that apply): Patient trending condition, Treatment, and Consult recommendations  Discharge Plan A: Long-term acute care facility (LTAC)                  Johnna Irizarry MD  Department of Hospital Medicine   [facility] - Telemetry ([facility])

## 2024-11-01 NOTE — ASSESSMENT & PLAN NOTE
Patient has paroxysmal (<7 days) atrial fibrillation. Patient is currently in sinus rhythm. [id number] Score: 1. The patients heart rate in the last 24 hours is as follows:  Pulse  Min: 61  Max: 125     Antiarrhythmics  amiodarone tablet 200 mg, Daily, Oral  metoprolol tartrate (LOPRESSOR) tablet 25 mg, Every 6 hours, Oral    Anticoagulants  rivaroxaban tablet 15 mg, With dinner, Oral    Plan  - Replete lytes with a goal of K>4, Mg >2  - Patient is anticoagulated, see medications listed above.  - Patient's afib is currently controlled  - cardiology following

## 2024-11-01 NOTE — ASSESSMENT & PLAN NOTE
AFIB now V controlled    Continue amio gtt for rate control  Continue heparin gtt  Ok for gentle hydratiob and maryjo gtt for ARF and ? Septal shock     10/31/24  -Tachycardic this AM, given dose of IV digoxin  -EKG pending  -Amiodarone switched to po   -Off pressor support, Lopressor 25 mg q 6 hours initiated  -On Xarelto for CVA prophylaxis     11/1/24  -Appears to have converted to SR, EKG pending  -Continue po amio, BB  -Continue Xarelto

## 2024-11-01 NOTE — PT/OT/SLP EVAL
"Physical Therapy Evaluation and Treatment    Patient Name: Layton Abrams   MRN: [id number]  Recent Surgery: * No surgery found *      Recommendations:     Discharge Recommendations: Moderate Intensity Therapy   Discharge Equipment Recommendations: to be determined by next level of care   Barriers to discharge: Decreased caregiver support    Assessment:     Layton Abrams is a 68 y.o. male admitted with a medical diagnosis of Atrial fibrillation with RVR. He presents with the following impairments/functional limitations: weakness, impaired endurance, impaired functional mobility, gait instability, impaired balance, pain, decreased safety awareness, decreased lower extremity function, decreased coordination.    Rehab Prognosis: Good; patient would benefit from acute PT services to address these deficits and reach maximum level of function.    Plan:     During this hospitalization, patient to be seen 3 x/week to address the above listed problems via gait training, therapeutic activities, therapeutic exercises    Plan of Care Expires: 11/15/24    Subjective     Chief Complaint: Reported "I can't stand, I'm in too much pain."  Patient Comments/Goals: none stated  Pain/Comfort:  Pain Rating 1: 0/10 (at rest)  Location - Side 1: Bilateral  Location - Orientation 1: generalized  Location 1: leg  Pain Addressed 1: Reposition, Distraction, Cessation of Activity  Pain Rating Post-Intervention 1: 10/10 (with movement)    Social History:  Living Environment: Patient lives with their father in a single story home with number of outside stair(s): 1  Prior Level of Function: Prior to admission, patient was modified independent, driving and retired, and ambulated household distances using rolling walker  Equipment Used at Home: walker, rolling, bedside commode  DME owned (not currently used): none  Assistance Upon Discharge: family    Objective:     Communicated with nurse Avalos and epic chart review prior to session. Patient " "found supine with peripheral IV, PICC line, bustillo catheter, bed alarm, telemetry upon PT entry to room.    General Precautions: Standard, fall   Orthopedic Precautions: N/A   Braces: N/A    Respiratory Status: Room air    Exams:  Cognition: Patient is oriented to Person and Place  RLE ROM: WFL  RLE Strength:  NT due to reported 10/10 pain, will assess as able  LLE ROM: WFL  LLE Strength:  NT due to reported 10/10 pain, will assess as able  Sensation:    -       Intact  Skin Integrity/Edema:     -       Skin integrity: Visible skin intact    Functional Mobility:  Gait belt applied - Yes  Bed Mobility  Rolling Left: maximal assistance  Scooting: maximal assistance  Supine to Sit: maximal assistance for LE management and trunk management  Sit to Supine: maximal assistance for LE management and trunk management  Transfers  Pt refused this date due to pain, will progress as able.  Balance  Sitting: contact guard assistance  Sat EOB x10min    Therapeutic Activities and Exercises:   Pt educated on role of PT in acute care and POC. Educated on importance of OOB activities, activity pacing, and HEP (marching/hip flex, hip abd, heel slides/LAQ, quad sets, ankle pumps) in order to maintain/regain strength. Encouraged to sit up in chair for all meals. Educated on "call don't fall" policy and increased risk of falling due to weakness, instructed to utilize call bell for assistance with all transfers. Pt agreeable to all requests.    AM-PAC 6 CLICK MOBILITY  Total Score:8    Patient left with bed in chair position with all lines intact, call button in reach, and bed alarm on.    GOALS:   Multidisciplinary Problems       Physical Therapy Goals          Problem: Physical Therapy    Goal Priority Disciplines Outcome Interventions   Physical Therapy Goal     PT, PT/OT     Description: Goals to be met by 11/15/24.  1. Pt will complete bed mobility MIN A.  2. Pt will complete sit to stand MIN A.  3. Pt will ambulate 50ft MIN A using " RW.  4. Pt will increase AMPAC score by 2 points to progress functional mobility.                       History:     Past Medical History:   Diagnosis Date    Arthritis     CHF (congestive heart failure)     COPD (chronic obstructive pulmonary disease)     Hypertension     Liver disease     Stroke        No past surgical history on file.    Time Tracking:     PT Received On: 11/01/24  PT Start Time: 1125  PT Stop Time: 1150  PT Total Time (min): 25 min     Billable Minutes: Evaluation 15min and Therapeutic Activity 10min    11/1/2024

## 2024-11-01 NOTE — PLAN OF CARE
PT EVAL complete. Required MAX A for bed mobility. Recommending moderated intensity therapy upon d/c.

## 2024-11-01 NOTE — SUBJECTIVE & OBJECTIVE
Interval History: f/u leg pain patient reporting pain with movement of legs. Discussed with cardiology plan arterial US to rule out PAD. Converted to sinus    Review of Systems  Objective:     Vital Signs (Most Recent):  Temp: 99.7 °F (37.6 °C) (11/01/24 1559)  Pulse: 77 (11/01/24 1559)  Resp: 18 (11/01/24 1559)  BP: 102/61 (11/01/24 1559)  SpO2: (!) 94 % (11/01/24 1559) Vital Signs (24h Range):  Temp:  [98 °F (36.7 °C)-99.7 °F (37.6 °C)] 99.7 °F (37.6 °C)  Pulse:  [] 77  Resp:  [16-20] 18  SpO2:  [94 %-97 %] 94 %  BP: (102-143)/(56-87) 102/61     Weight: 86 kg (189 lb 9.5 oz)  Body mass index is 28 kg/m².    Intake/Output Summary (Last 24 hours) at 11/1/2024 1633  Last data filed at 11/1/2024 1026  Gross per 24 hour   Intake 200 ml   Output 3150 ml   Net -2950 ml         Physical Exam  HENT:      Head: Normocephalic and atraumatic.   Cardiovascular:      Rate and Rhythm: Normal rate and regular rhythm.      Heart sounds: No murmur heard.  Pulmonary:      Effort: Pulmonary effort is normal. No respiratory distress.      Breath sounds: Normal breath sounds. No wheezing.   Abdominal:      General: Bowel sounds are normal. There is no distension.      Palpations: Abdomen is soft.      Tenderness: There is no abdominal tenderness.   Musculoskeletal:         General: No swelling.   Skin:     General: Skin is warm and dry.   Neurological:      Mental Status: He is alert and oriented to person, place, and time. Mental status is at baseline.             Significant Labs: All pertinent labs within the past 24 hours have been reviewed.  Recent Lab Results         11/01/24  0420        Albumin 2.9       ALP 37       ALT 10       Anion Gap 8       AST 24       Baso # 0.01       Basophil % 0.1       BILIRUBIN TOTAL 0.4  Comment: For infants and newborns, interpretation of results should be based  on gestational age, weight and in agreement with clinical  observations.    Premature Infant recommended reference ranges:  Up  to 24 hours.............<8.0 mg/dL  Up to 48 hours............<12.0 mg/dL  3-5 days..................<15.0 mg/dL  6-29 days.................<15.0 mg/dL         BUN 62       Calcium 8.7       Chloride 106       CO2 23       Creatinine 2.1       Differential Method Automated       eGFR 34       Eos # 0.2       Eos % 2.8       Glucose 87       Gran # (ANC) 5.0       Gran % 73.7       Hematocrit 33.2       Hemoglobin 10.8       Immature Grans (Abs) 0.04  Comment: Mild elevation in immature granulocytes is non specific and   can be seen in a variety of conditions including stress response,   acute inflammation, trauma and pregnancy. Correlation with other   laboratory and clinical findings is essential.         Immature Granulocytes 0.6       Lymph # 1.0       Lymph % 14.0       Magnesium  1.7       MCH 27.8       MCHC 32.5       MCV 86       Mono # 0.6       Mono % 8.8       MPV 9.5       nRBC 0       Phosphorus Level 3.4       Platelet Count 121       Potassium 4.9       PROTEIN TOTAL 5.7       RBC 3.88       RDW 13.9       Sodium 137       WBC 6.84               Significant Imaging: I have reviewed all pertinent imaging results/findings within the past 24 hours.

## 2024-11-01 NOTE — PLAN OF CARE
Problem: Adult Inpatient Plan of Care  Goal: Plan of Care Review  Outcome: Progressing  Goal: Patient-Specific Goal (Individualized)  Outcome: Progressing  Goal: Absence of Hospital-Acquired Illness or Injury  Outcome: Progressing  Goal: Optimal Comfort and Wellbeing  Outcome: Progressing  Goal: Readiness for Transition of Care  Outcome: Progressing     Problem: Wound  Goal: Optimal Coping  Outcome: Progressing  Goal: Optimal Functional Ability  Outcome: Progressing  Goal: Absence of Infection Signs and Symptoms  Outcome: Progressing  Goal: Improved Oral Intake  Outcome: Progressing  Goal: Optimal Pain Control and Function  Outcome: Progressing  Goal: Skin Health and Integrity  Outcome: Progressing  Goal: Optimal Wound Healing  Outcome: Progressing     Problem: Infection  Goal: Absence of Infection Signs and Symptoms  Outcome: Progressing     Problem: Acute Kidney Injury/Impairment  Goal: Fluid and Electrolyte Balance  Outcome: Progressing  Goal: Improved Oral Intake  Outcome: Progressing  Goal: Effective Renal Function  Outcome: Progressing     Problem: Skin Injury Risk Increased  Goal: Skin Health and Integrity  Outcome: Progressing     Problem: Fall Injury Risk  Goal: Absence of Fall and Fall-Related Injury  Outcome: Progressing

## 2024-11-01 NOTE — ASSESSMENT & PLAN NOTE
Advise to check CVP     Ok to hold ToprolXl and aldactone due to low BP     10/31/24  -Monitor CVP  -TTE with normal EF, moderately reduced RV systolic function, pulm HTN  -BB resumed    11/1/24  -See above

## 2024-11-01 NOTE — PROGRESS NOTES
"[facility] - Telemetry ([facility])  Cardiology  Progress Note    Patient Name: Layton Abrams  MRN: [id number]  Admission Date: 10/29/2024  Hospital Length of Stay: 3 days  Code Status: Full Code   Attending Physician: Johnna Irizarry MD   Primary Care Physician: Nellie Cadena MD  Expected Discharge Date:   Principal Problem:Atrial fibrillation with RVR    Subjective:   HPI:  Presented to OSH ED on 10/29 with one day history of weakness  Eval revealed atrial fib with RVR; hypotension; CARINA with BUN/Cr 132/5.38 (baseline 0.97) and potassium 7  Treatment included attempted cardioversion (unsuccessful); calcium, dextrose/insulin, lactated Ringer's, IV metoprolol, IV bicarbonate, and Lokelma. Levophed infusion was initiated. PICC line and bustillo catheter were placed     [facility] was contacted for transfer for CARINA/hyperkalemia with potential need nephrology services along with atrial fib with RVR and hypotension     Seen and examined on arrival to [facility] ICU  AAOx3. Atrial fib on monitor with ventricular rate 130-140. Marginal BP with MAP 68  Complains only of intermittent severe RLE "cramping"  STAT follow up labs along with bicarb and amiodarone infusions ordered     Cardiology consult for afib with RVR.  PMH chronic afib, HTN; CAD; HFrEF (4/2024 30-40%EF with ungraded diastolic dysfunction and "giant" rt heart; COPD; h/o DVT earlier this year on xarelto; HCV; prior CVA x 5. Prior cardiologist Dr. Cowan.  Admitted for ARF low BP and afib with RVR. Baseline Cr 0.8 and was 5 and improved to 4.7 after IVF  Multiple ulcers and abscess identified by wound care team  Now AFIB VR controlled, BP at 90/60 with nuke gtt   No active bleeding. On heparin gtt.   EKG AFIB RBBB    Hospital Course:   10/31/24-Patient seen and examined today, lying in bed. Appears weak/deconditioned. States he feels well. No CV complaints. Noted to be tachycardic this AM, given dose of IV digoxin with improvement. Off pressor support. Labs reviewed. " Creatinine down to 2.5. Mg 1.3, being repleted. TTE with normal EF, moderately reduced RV function, pulmonary HTN. BB initiated for HR control.    11/1/24-Patient seen and examined today. Awake, alert. States he feels well. Complains of BLE pain. Appears to have converted to SR, EKG pending. Labs reviewed. Creatinine down to 2.1.         Review of Systems   Constitutional: Positive for malaise/fatigue.   HENT: Negative.     Eyes: Negative.    Cardiovascular: Negative.    Respiratory: Negative.     Endocrine: Negative.    Hematologic/Lymphatic: Negative.    Skin: Negative.    Musculoskeletal:  Positive for arthritis and joint pain.   Gastrointestinal: Negative.    Genitourinary: Negative.    Neurological:  Positive for weakness.   Psychiatric/Behavioral: Negative.     Allergic/Immunologic: Negative.      Objective:     Vital Signs (Most Recent):  Temp: 98 °F (36.7 °C) (11/01/24 1216)  Pulse: 94 (11/01/24 1317)  Resp: 18 (11/01/24 1216)  BP: (!) 143/87 (11/01/24 1317)  SpO2: 96 % (11/01/24 0810) Vital Signs (24h Range):  Temp:  [97.4 °F (36.3 °C)-99.5 °F (37.5 °C)] 98 °F (36.7 °C)  Pulse:  [] 94  Resp:  [16-22] 18  SpO2:  [94 %-100 %] 96 %  BP: (104-143)/(56-87) 143/87     Weight: 86 kg (189 lb 9.5 oz)  Body mass index is 28 kg/m².     SpO2: 96 %         Intake/Output Summary (Last 24 hours) at 11/1/2024 1349  Last data filed at 11/1/2024 0638  Gross per 24 hour   Intake 457.29 ml   Output 2500 ml   Net -2042.71 ml       Lines/Drains/Airways       Peripherally Inserted Central Catheter Line  Duration             PICC Triple Lumen 10/29/24 1000 right basilic 3 days              Drain  Duration                  Urethral Catheter 10/30/24 1710 Non-latex;Temperature probe 16 Fr. 1 day              Peripheral Intravenous Line  Duration                  Peripheral IV - Single Lumen 10/29/24 20 G 1 1/4 in Anterior;Left Forearm 3 days                       Physical Exam  Vitals and nursing note reviewed.  "  Constitutional:       Appearance: He is ill-appearing.   HENT:      Head: Normocephalic and atraumatic.   Eyes:      Pupils: Pupils are equal, round, and reactive to light.   Cardiovascular:      Rate and Rhythm: Normal rate and regular rhythm. Extrasystoles are present.     Heart sounds: S1 normal and S2 normal. No murmur heard.  Pulmonary:      Comments: Diminished  Abdominal:      Palpations: Abdomen is soft.   Neurological:      Mental Status: He is oriented to person, place, and time.      Comments: Residual deficits from prior CVA   Psychiatric:         Mood and Affect: Mood normal.            Significant Labs: CMP   Recent Labs   Lab 10/31/24  0436 11/01/24  0420    137   K 4.3 4.9    106   CO2 21* 23   * 87   BUN 93* 62*   CREATININE 2.5* 2.1*   CALCIUM 8.2* 8.7   PROT 5.0* 5.7*   ALBUMIN 2.7*  2.7* 2.9*   BILITOT 0.2 0.4   ALKPHOS 32* 37*   AST 16 24   ALT 8* 10   ANIONGAP 13 8   , CBC   Recent Labs   Lab 10/31/24  0436 11/01/24  0420   WBC 5.81 6.84   HGB 10.0* 10.8*   HCT 30.6* 33.2*   * 121*   , Troponin No results for input(s): "TROPONINI" in the last 48 hours., and All pertinent lab results from the last 24 hours have been reviewed.    Significant Imaging: Echocardiogram: Transthoracic echo (TTE) complete ([facility] Only):   Results for orders placed or performed during the hospital encounter of 10/29/24   Echo   Result Value Ref Range    BSA 2.01 m2    LVOT stroke volume 38.6 cm3    LVIDd 3.7 3.5 - 6.0 cm    LV Systolic Volume 34.57 mL    LV Systolic Volume Index 17.4 mL/m2    LVIDs 3.0 2.1 - 4.0 cm    LV Diastolic Volume 56.17 mL    LV Diastolic Volume Index 28.23 mL/m2    Left Ventricular End Systolic Volume by Teichholz Method 34.57 mL    Left Ventricular End Diastolic Volume by Teichholz Method 56.17 mL    IVS 1.2 (A) 0.6 - 1.1 cm    LVOT diameter 2.0 cm    LVOT area 3.1 cm2    FS 18.9 (A) 28 - 44 %    Left Ventricle Relative Wall Thickness 0.65 cm    PW 1.2 (A) 0.6 - 1.1 " cm    LV mass 147.3 g    LV Mass Index 74.0 g/m2    MV Peak E Jonathon 0.60 m/s    TDI LATERAL 0.15 m/s    TDI SEPTAL 0.16 m/s    E/E' ratio 3.87 m/s    MV Peak A Jonathon 0.48 m/s    TR Max Jonathon 3.31 m/s    E/A ratio 1.25     IVRT 72.31 msec    E wave deceleration time 198.56 msec    LV SEPTAL E/E' RATIO 3.75 m/s    LV LATERAL E/E' RATIO 4.00 m/s    LVOT peak jonathon 0.8 m/s    Left Ventricular Outflow Tract Mean Velocity 0.62 cm/s    Left Ventricular Outflow Tract Mean Gradient 1.70 mmHg    RVOT peak VTI 10.2 cm    TAPSE 1.66 cm    LA size 4.26 cm    Left Atrium Minor Axis 5.76 cm    Left Atrium Major Axis 5.77 cm    RA Major Axis 7.88 cm    AV mean gradient 3.5 mmHg    AV peak gradient 4.8 mmHg    Ao peak jonathon 1.1 m/s    Ao VTI 14.0 cm    LVOT peak VTI 12.3 cm    AV valve area 2.8 cm²    AV Velocity Ratio 0.73     AV index (prosthetic) 0.88     JAJA by Velocity Ratio 2.3 cm²    Mr max jonathon 2.74 m/s    MV stenosis pressure 1/2 time 57.58 ms    MV valve area p 1/2 method 3.82 cm2    Triscuspid Valve Regurgitation Peak Gradient 44 mmHg    PV mean gradient 1 mmHg    RVOT peak jonathon 0.67 m/s    Ao root annulus 2.97 cm    STJ 3.08 cm    Ascending aorta 2.85 cm    IVC diameter 2.85 cm    Mean e' 0.16 m/s    ZLVIDS -1.32     ZLVIDD -4.45     RVDD 4.42 cm    JEREMIE 33.6 mL/m2    LA Vol 66.80 cm3    LA WIDTH 3.2 cm    RA Width 6.3 cm    TV resting pulmonary artery pressure 59 mmHg    RV TB RVSP 18 mmHg    Est. RA pres 15 mmHg    Narrative      Left Ventricle: The left ventricle is normal in size. Ventricular mass   is normal. Mildly increased wall thickness. There is concentric   remodeling. There is normal systolic function with a visually estimated   ejection fraction of 60 - 65%. Unable to assess diastolic function due to   atrial fibrillation.    Right Ventricle: Severe right ventricular enlargement. Wall thickness   is normal. Systolic function is moderately reduced.    Right Atrium: Right atrium is severely dilated.    Tricuspid Valve:  There is severe regurgitation.    Pulmonic Valve: There is mild regurgitation.    Pulmonary Artery: There is moderate pulmonary hypertension. The   estimated pulmonary artery systolic pressure is 59 mmHg.    IVC/SVC: Elevated venous pressure at 15 mmHg.      and EKG: Reviewed  Assessment and Plan:   Patient who presents with weakness and afib with RVR. Appears to have converted to SR. Stable CV wise. Monitor volume status closely.    * Atrial fibrillation with RVR  AFIB now V controlled    Continue amio gtt for rate control  Continue heparin gtt  Ok for gentle hydratiob and maryjo gtt for ARF and ? Septal shock     10/31/24  -Tachycardic this AM, given dose of IV digoxin  -EKG pending  -Amiodarone switched to po   -Off pressor support, Lopressor 25 mg q 6 hours initiated  -On Xarelto for CVA prophylaxis     11/1/24  -Appears to have converted to SR, EKG pending  -Continue po amio, BB  -Continue Xarelto    HFrEF (heart failure with reduced ejection fraction)  Advise to check CVP     Ok to hold ToprolXl and aldactone due to low BP     10/31/24  -Monitor CVP  -TTE with normal EF, moderately reduced RV systolic function, pulm HTN  -BB resumed    11/1/24  -See above    Hypotension  Continue IVF    10/31/24  -Improved, off pressor support    Hyperkalemia  Now K normalized    CARINA (acute kidney injury)  On IVF    10/31/24  -Improved to 2.5, nephrology following    11/1/24  -Creatinine down to 2.1        VTE Risk Mitigation (From admission, onward)           Ordered     rivaroxaban tablet 15 mg  With dinner         10/31/24 0912                    Meredith Rojas PA-C  Cardiology  [facility] - Telemetry ([facility])

## 2024-11-01 NOTE — ASSESSMENT & PLAN NOTE
CARINA is resolving, Cr lower daily. Cr is baseline appears normal  CARINA likely was due to prerenal azotemia, secondary to AF, decreased renal perfusion.  Will monitor

## 2024-11-01 NOTE — PROGRESS NOTES
[facility]  W[facility]    Patient Name:  Layton Abrams   MRN:  [id number]  Date: 11/1/2024  Diagnosis: Atrial fibrillation with RVR    History:     Past Medical History:   Diagnosis Date    Arthritis     CHF (congestive heart failure)     COPD (chronic obstructive pulmonary disease)     Hypertension     Liver disease     Stroke        Social History     Socioeconomic History    Marital status: Unknown   Tobacco Use    Smoking status: Former     Current packs/day: 0.50     Types: Cigarettes    Smokeless tobacco: Never    Tobacco comments:     Quit 2010   Substance and Sexual Activity    Alcohol use: Not Currently    Drug use: Not Currently     Social Drivers of Health     Financial Resource Strain: Low Risk  (10/29/2024)    Overall Financial Resource Strain (CARDIA)     Difficulty of Paying Living Expenses: Not very hard   Food Insecurity: No Food Insecurity (10/29/2024)    Hunger Vital Sign     Worried About Running Out of Food in the Last Year: Never true     Ran Out of Food in the Last Year: Never true   Transportation Needs: No Transportation Needs (10/29/2024)    TRANSPORTATION NEEDS     Transportation : No   Stress: No Stress Concern Present (10/29/2024)    [employer] [place] - Occupational Stress Questionnaire     Feeling of Stress : Only a little   Housing Stability: Low Risk  (10/29/2024)    Housing Stability Vital Sign     Unable to Pay for Housing in the Last Year: No     Homeless in the Last Year: No       Precautions:     Allergies as of 10/29/2024    (No Known Allergies)       WO Assessment Details/Treatment     F/U with Mr. Abrams for ongoing wound care management for present on admission altered skin integrity. Patient has now been downgraded from ICU to the telemetry floor since last visit. Patient awake alert and agreeable to treatment. States his feet are hurting him today.   Heel foams removed from bilateral heels, Right heel skin intact. Left heel again  noted to have DTI to the medial aspect measuring 4x7cm and an ulceration to the lateral aspect measuring 1x0.3x0.3cm. Medial DTI maroon, purple intact skin no drainage noted. Cleansed with vashe and applied cavilon. Lateral ulceration pink/red/yellow moist wound bed with small amount of serosanguinous drainage. Cleansed with vashe, patted dry and painted priwound with cavilon. Strip of Aquacel Hydrofiber placed in wound bed and replaced heel foam. Will update wound care orders for left lateral heel ulceration.   Foam dressing removed from left lateral lower leg, ulceration again noted. Moist pink/red wound bed with small amount of serosanguinous drainage. Cleansed with vashe, patted dry, painted with cavilon, and reapplied foam dressing. Continue wound care as ordered.   Right groin partial thickness tissue loss has resolved skin is intact, area still very moist recommend continuing with wound care ordered for moisture management to right and left groin.   Foam dressing removed from lumbar spine, abscess again noted appears smaller in size measuring 2x2x0.1cm this visit. Redness and maroon discoloration around the area has improved, still no warmth noted. Red moist granulation tissue noted in wound bed, scant amount of serosanguinous drainage noted. Cleansed with saline, patted dry, applied foam dressing. Continue with wound care as ordered.   Continue Pressure injury preventions to include: turn Q2 hours, heel offloading, and moisture management.      11/01/24 1020   WOCN Assessment   WOCN Total Time (mins) 45   Visit Date 11/01/24   Visit Time 1020   Consult Type Follow Up   WOCN Speciality Wound   Wound deep tissue injury;moisture;At risk for pressure Injury   Intervention assessed;changed;applied;orders;chart review   Teaching on-going        Wound 10/29/24 2204 Ulceration Left lateral Heel   Date First Assessed/Time First Assessed: 10/29/24 2204   Present on Original Admission: Yes  Primary Wound Type:  Ulceration  Side: Left  Orientation: lateral  Location: Heel  Is this injury device related?: No   Dressing Appearance Moist drainage   Drainage Amount Small   Drainage Characteristics/Odor Serosanguineous   Appearance Pink;Red;Yellow   Tissue loss description Full thickness   Red (%), Wound Tissue Color 60 %   Yellow (%), Wound Tissue Color 40 %   Periwound Area Dry   Wound Edges Callused   Wound Length (cm) 1 cm   Wound Width (cm) 0.3 cm   Wound Depth (cm) 0.3 cm   Wound Volume (cm^3) 0.09 cm^3   Wound Surface Area (cm^2) 0.3 cm^2   Care Cleansed with:;Wound cleanser   Dressing Applied;Hydrofiber;Foam   Periwound Care Skin barrier film applied   Dressing Change Due 11/05/24        Wound 10/29/24 2206 Moisture associated dermatitis Right anterior Groin   Date First Assessed/Time First Assessed: 10/29/24 2206   Present on Original Admission: Yes  Primary Wound Type: Moisture associated dermatitis  Side: (c) Right  Orientation: anterior  Location: Groin  Is this injury device related?: No   Wound Image    Dressing Appearance Moist drainage   Drainage Amount Scant   Drainage Characteristics/Odor Serosanguineous;No odor   Appearance Intact   Periwound Area Moist   Care Cleansed with:;Wound cleanser;Applied:;Other (see comments)  (interdry)   Dressing Change Due 11/02/24        Wound 10/29/24 2208 Moisture associated dermatitis Left anterior Groin   Date First Assessed/Time First Assessed: 10/29/24 2208   Present on Original Admission: Yes  Primary Wound Type: Moisture associated dermatitis  Side: Left  Orientation: anterior  Location: Groin  Is this injury device related?: No   Dressing Appearance Moist drainage   Drainage Amount Scant   Drainage Characteristics/Odor Serosanguineous   Appearance Intact   Periwound Area Moist   Care Cleansed with:;Wound cleanser;Applied:  (interdry)   Dressing Change Due 11/02/24        Wound 10/30/24 1000 Pressure Injury Left medial Heel   Date First Assessed/Time First Assessed:  10/30/24 1000   Present on Original Admission: Yes  Primary Wound Type: Pressure Injury  Side: Left  Orientation: medial  Location: Heel   Pressure Injury Stage DTPI   Drainage Amount None   Drainage Characteristics/Odor No odor   Appearance Intact;Maroon;Purple   Periwound Area Dry   Wound Length (cm) 4 cm   Wound Width (cm) 7 cm   Wound Surface Area (cm^2) 28 cm^2   Care Cleansed with:;Wound cleanser;Applied:;Skin Barrier   Dressing Changed;Foam   Periwound Care Skin barrier film applied   Dressing Change Due 11/05/24        Wound 10/30/24 1000 Abscess Lumbar spine   Date First Assessed/Time First Assessed: 10/30/24 1000   Present on Original Admission: Yes  Primary Wound Type: Abscess  Location: Lumbar spine   Wound Image    Dressing Appearance Moist drainage   Drainage Amount Scant   Drainage Characteristics/Odor Serosanguineous   Appearance Red;Purple;Granulating   Tissue loss description Partial thickness   Red (%), Wound Tissue Color 100 %   Periwound Area Dry;Maroon   Wound Length (cm) 2 cm   Wound Width (cm) 2 cm   Wound Depth (cm) 0.1 cm   Wound Volume (cm^3) 0.4 cm^3   Wound Surface Area (cm^2) 4 cm^2   Care Cleansed with:;Wound cleanser;Applied:;Skin Barrier   Dressing Changed;Foam   Dressing Change Due 11/05/24        Wound 10/30/24 1000 Ulceration Left lateral;lower Leg   Date First Assessed/Time First Assessed: 10/30/24 1000   Present on Original Admission: Yes  Primary Wound Type: Ulceration  Side: Left  Orientation: lateral;lower  Location: Leg   Wound Image    Dressing Appearance Moist drainage   Drainage Amount Small   Drainage Characteristics/Odor Serosanguineous   Appearance Pink;Red;Moist   Tissue loss description Full thickness   Red (%), Wound Tissue Color 90 %   Yellow (%), Wound Tissue Color 10 %   Periwound Area Dry;Redness   Wound Edges Callused   Wound Length (cm) 2 cm   Wound Width (cm) 1 cm   Wound Depth (cm) 0.1 cm   Wound Volume (cm^3) 0.2 cm^3   Wound Surface Area (cm^2) 2 cm^2    Care Cleansed with:;Wound cleanser;Applied:;Skin Barrier   Dressing Changed;Foam   Dressing Change Due 11/05/24 11/01/2024

## 2024-11-01 NOTE — SUBJECTIVE & OBJECTIVE
"Interval History: Pt was seen and examined. Labs and meds reviewed. Discussed with other providers. Pt is a 69 y/o male who was admitted for AF c RVR. Pt had CARINA with s Cr of 5.6. Baseline Cr is < 1. Pt has a h/o of HTN; CAD; HFrEF (4/2024 30-40%EF with ungraded diastolic dysfunction and "giant" rt heart; COPD; atrial fib plus DVT earlier this year on xarelto; HCV; prior CVA x 5. Pt earlier in ICU. Moved to the floor last pm. Pt has no new c/o's, no CP, no SOB. His initial weakness has improved. Cardiology care of AF was reviewed.    Review of patient's allergies indicates:  No Known Allergies  Current Facility-Administered Medications   Medication Frequency    acetaminophen tablet 650 mg Q6H PRN    amiodarone tablet 200 mg Daily    arformoteroL nebulizer solution 15 mcg BID    budesonide nebulizer solution 0.5 mg Q12H    famotidine tablet 20 mg Daily    influenza (adjuvanted) (Fluad) 45 mcg/0.5 mL IM vaccine (> or = 64 yo) 0.5 mL Prior to discharge    lactated ringers infusion Continuous    metoprolol tartrate (LOPRESSOR) tablet 25 mg Q6H    mupirocin 2 % ointment BID    ondansetron injection 8 mg Q6H PRN    pneumoc 20-haylee conj-dip cr(PF) (PREVNAR-20 (PF)) injection Syrg 0.5 mL vaccine x 1 dose    rivaroxaban tablet 15 mg Daily with dinner    senna-docusate 8.6-50 mg per tablet 1 tablet BID       Objective:     Vital Signs (Most Recent):  Temp: 99.7 °F (37.6 °C) (11/01/24 1559)  Pulse: 77 (11/01/24 1816)  Resp: 18 (11/01/24 1559)  BP: 102/61 (11/01/24 1816)  SpO2: 96 % (11/01/24 1559) Vital Signs (24h Range):  Temp:  [98 °F (36.7 °C)-99.7 °F (37.6 °C)] 99.7 °F (37.6 °C)  Pulse:  [] 77  Resp:  [16-20] 18  SpO2:  [94 %-97 %] 96 %  BP: (102-143)/(56-87) 102/61     Weight: 86 kg (189 lb 9.5 oz) (10/31/24 0600)  Body mass index is 28 kg/m².  Body surface area is 2.05 meters squared.    I/O last 3 completed shifts:  In: 6678 [P.O.:750; I.V.:2733; IV Piggyback:3194.9]  Out: 6100 [Urine:6100]     Physical " Exam  Vitals and nursing note reviewed.   Constitutional:       Appearance: Normal appearance.   Cardiovascular:      Rate and Rhythm: Normal rate and regular rhythm.      Pulses: Normal pulses.      Heart sounds: Normal heart sounds.   Pulmonary:      Breath sounds: Normal breath sounds.   Abdominal:      Palpations: Abdomen is soft.   Musculoskeletal:      Right lower leg: No edema.      Left lower leg: No edema.   Neurological:      Mental Status: He is alert and oriented to person, place, and time.   Psychiatric:         Behavior: Behavior normal.          Significant Labs: reviewed  [facility]  Lab Results   Component Value Date     11/01/2024    K 4.9 11/01/2024     11/01/2024    CO2 23 11/01/2024    BUN 62 (H) 11/01/2024    CREATININE 2.1 (H) 11/01/2024    CALCIUM 8.7 11/01/2024    ANIONGAP 8 11/01/2024    EGFRNORACEVR 34 (A) 11/01/2024     Lab Results   Component Value Date    WBC 6.84 11/01/2024    HGB 10.8 (L) 11/01/2024    HCT 33.2 (L) 11/01/2024    MCV 86 11/01/2024     (L) 11/01/2024         Significant Imaging: reviewed

## 2024-11-01 NOTE — SUBJECTIVE & OBJECTIVE
Review of Systems   Constitutional: Positive for malaise/fatigue.   HENT: Negative.     Eyes: Negative.    Cardiovascular: Negative.    Respiratory: Negative.     Endocrine: Negative.    Hematologic/Lymphatic: Negative.    Skin: Negative.    Musculoskeletal:  Positive for arthritis and joint pain.   Gastrointestinal: Negative.    Genitourinary: Negative.    Neurological:  Positive for weakness.   Psychiatric/Behavioral: Negative.     Allergic/Immunologic: Negative.      Objective:     Vital Signs (Most Recent):  Temp: 98 °F (36.7 °C) (11/01/24 1216)  Pulse: 94 (11/01/24 1317)  Resp: 18 (11/01/24 1216)  BP: (!) 143/87 (11/01/24 1317)  SpO2: 96 % (11/01/24 0810) Vital Signs (24h Range):  Temp:  [97.4 °F (36.3 °C)-99.5 °F (37.5 °C)] 98 °F (36.7 °C)  Pulse:  [] 94  Resp:  [16-22] 18  SpO2:  [94 %-100 %] 96 %  BP: (104-143)/(56-87) 143/87     Weight: 86 kg (189 lb 9.5 oz)  Body mass index is 28 kg/m².     SpO2: 96 %         Intake/Output Summary (Last 24 hours) at 11/1/2024 1349  Last data filed at 11/1/2024 0638  Gross per 24 hour   Intake 457.29 ml   Output 2500 ml   Net -2042.71 ml       Lines/Drains/Airways       Peripherally Inserted Central Catheter Line  Duration             PICC Triple Lumen 10/29/24 1000 right basilic 3 days              Drain  Duration                  Urethral Catheter 10/30/24 1710 Non-latex;Temperature probe 16 Fr. 1 day              Peripheral Intravenous Line  Duration                  Peripheral IV - Single Lumen 10/29/24 20 G 1 1/4 in Anterior;Left Forearm 3 days                       Physical Exam  Vitals and nursing note reviewed.   Constitutional:       Appearance: He is ill-appearing.   HENT:      Head: Normocephalic and atraumatic.   Eyes:      Pupils: Pupils are equal, round, and reactive to light.   Cardiovascular:      Rate and Rhythm: Normal rate and regular rhythm. Extrasystoles are present.     Heart sounds: S1 normal and S2 normal. No murmur heard.  Pulmonary:       "Comments: Diminished  Abdominal:      Palpations: Abdomen is soft.   Neurological:      Mental Status: He is oriented to person, place, and time.      Comments: Residual deficits from prior CVA   Psychiatric:         Mood and Affect: Mood normal.            Significant Labs: CMP   Recent Labs   Lab 10/31/24  0436 11/01/24  0420    137   K 4.3 4.9    106   CO2 21* 23   * 87   BUN 93* 62*   CREATININE 2.5* 2.1*   CALCIUM 8.2* 8.7   PROT 5.0* 5.7*   ALBUMIN 2.7*  2.7* 2.9*   BILITOT 0.2 0.4   ALKPHOS 32* 37*   AST 16 24   ALT 8* 10   ANIONGAP 13 8   , CBC   Recent Labs   Lab 10/31/24  0436 11/01/24  0420   WBC 5.81 6.84   HGB 10.0* 10.8*   HCT 30.6* 33.2*   * 121*   , Troponin No results for input(s): "TROPONINI" in the last 48 hours., and All pertinent lab results from the last 24 hours have been reviewed.    Significant Imaging: Echocardiogram: Transthoracic echo (TTE) complete (Cupid Only):   Results for orders placed or performed during the hospital encounter of 10/29/24   Echo   Result Value Ref Range    BSA 2.01 m2    LVOT stroke volume 38.6 cm3    LVIDd 3.7 3.5 - 6.0 cm    LV Systolic Volume 34.57 mL    LV Systolic Volume Index 17.4 mL/m2    LVIDs 3.0 2.1 - 4.0 cm    LV Diastolic Volume 56.17 mL    LV Diastolic Volume Index 28.23 mL/m2    Left Ventricular End Systolic Volume by Teichholz Method 34.57 mL    Left Ventricular End Diastolic Volume by Teichholz Method 56.17 mL    IVS 1.2 (A) 0.6 - 1.1 cm    LVOT diameter 2.0 cm    LVOT area 3.1 cm2    FS 18.9 (A) 28 - 44 %    Left Ventricle Relative Wall Thickness 0.65 cm    PW 1.2 (A) 0.6 - 1.1 cm    LV mass 147.3 g    LV Mass Index 74.0 g/m2    MV Peak E Jonathon 0.60 m/s    TDI LATERAL 0.15 m/s    TDI SEPTAL 0.16 m/s    E/E' ratio 3.87 m/s    MV Peak A Jonathon 0.48 m/s    TR Max Jonathon 3.31 m/s    E/A ratio 1.25     IVRT 72.31 msec    E wave deceleration time 198.56 msec    LV SEPTAL E/E' RATIO 3.75 m/s    LV LATERAL E/E' RATIO 4.00 m/s    LVOT peak " chris 0.8 m/s    Left Ventricular Outflow Tract Mean Velocity 0.62 cm/s    Left Ventricular Outflow Tract Mean Gradient 1.70 mmHg    RVOT peak VTI 10.2 cm    TAPSE 1.66 cm    LA size 4.26 cm    Left Atrium Minor Axis 5.76 cm    Left Atrium Major Axis 5.77 cm    RA Major Axis 7.88 cm    AV mean gradient 3.5 mmHg    AV peak gradient 4.8 mmHg    Ao peak chris 1.1 m/s    Ao VTI 14.0 cm    LVOT peak VTI 12.3 cm    AV valve area 2.8 cm²    AV Velocity Ratio 0.73     AV index (prosthetic) 0.88     JAJA by Velocity Ratio 2.3 cm²    Mr max chris 2.74 m/s    MV stenosis pressure 1/2 time 57.58 ms    MV valve area p 1/2 method 3.82 cm2    Triscuspid Valve Regurgitation Peak Gradient 44 mmHg    PV mean gradient 1 mmHg    RVOT peak chris 0.67 m/s    Ao root annulus 2.97 cm    STJ 3.08 cm    Ascending aorta 2.85 cm    IVC diameter 2.85 cm    Mean e' 0.16 m/s    ZLVIDS -1.32     ZLVIDD -4.45     RVDD 4.42 cm    JEREMIE 33.6 mL/m2    LA Vol 66.80 cm3    LA WIDTH 3.2 cm    RA Width 6.3 cm    TV resting pulmonary artery pressure 59 mmHg    RV TB RVSP 18 mmHg    Est. RA pres 15 mmHg    Narrative      Left Ventricle: The left ventricle is normal in size. Ventricular mass   is normal. Mildly increased wall thickness. There is concentric   remodeling. There is normal systolic function with a visually estimated   ejection fraction of 60 - 65%. Unable to assess diastolic function due to   atrial fibrillation.    Right Ventricle: Severe right ventricular enlargement. Wall thickness   is normal. Systolic function is moderately reduced.    Right Atrium: Right atrium is severely dilated.    Tricuspid Valve: There is severe regurgitation.    Pulmonic Valve: There is mild regurgitation.    Pulmonary Artery: There is moderate pulmonary hypertension. The   estimated pulmonary artery systolic pressure is 59 mmHg.    IVC/SVC: Elevated venous pressure at 15 mmHg.      and EKG: Reviewed

## 2024-11-01 NOTE — PROGRESS NOTES
"[facility] - Telemetry ([facility])  Nephrology  Progress Note    Patient Name: Layton Abrams  MRN: [id number]  Admission Date: 10/29/2024  Hospital Length of Stay: 3 days  Attending Provider: Johnna Irizarry MD   Primary Care Physician: Nellie Cadena MD  Principal Problem:Atrial fibrillation with RVR    Subjective:     HPI: Noted    Interval History: Pt was seen and examined. Labs and meds reviewed. Discussed with other providers. Pt is a 67 y/o male who was admitted for AF c RVR. Pt had CARINA with s Cr of 5.6. Baseline Cr is < 1. Pt has a h/o of HTN; CAD; HFrEF (4/2024 30-40%EF with ungraded diastolic dysfunction and "giant" rt heart; COPD; atrial fib plus DVT earlier this year on xarelto; HCV; prior CVA x 5. Pt earlier in ICU. Moved to the floor last pm. Pt has no new c/o's, no CP, no SOB. His initial weakness has improved. Cardiology care of AF was reviewed.    Review of patient's allergies indicates:  No Known Allergies  Current Facility-Administered Medications   Medication Frequency    acetaminophen tablet 650 mg Q6H PRN    amiodarone tablet 200 mg Daily    arformoteroL nebulizer solution 15 mcg BID    budesonide nebulizer solution 0.5 mg Q12H    famotidine tablet 20 mg Daily    influenza (adjuvanted) (Fluad) 45 mcg/0.5 mL IM vaccine (> or = 66 yo) 0.5 mL Prior to discharge    lactated ringers infusion Continuous    metoprolol tartrate (LOPRESSOR) tablet 25 mg Q6H    mupirocin 2 % ointment BID    ondansetron injection 8 mg Q6H PRN    pneumoc 20-haylee conj-dip cr(PF) (PREVNAR-20 (PF)) injection Syrg 0.5 mL vaccine x 1 dose    rivaroxaban tablet 15 mg Daily with dinner    senna-docusate 8.6-50 mg per tablet 1 tablet BID       Objective:     Vital Signs (Most Recent):  Temp: 99.7 °F (37.6 °C) (11/01/24 1559)  Pulse: 77 (11/01/24 1816)  Resp: 18 (11/01/24 1559)  BP: 102/61 (11/01/24 1816)  SpO2: 96 % (11/01/24 2469) Vital Signs (24h Range):  Temp:  [98 °F (36.7 °C)-99.7 °F (37.6 °C)] 99.7 °F (37.6 °C)  Pulse:  " [] 77  Resp:  [16-20] 18  SpO2:  [94 %-97 %] 96 %  BP: (102-143)/(56-87) 102/61     Weight: 86 kg (189 lb 9.5 oz) (10/31/24 0600)  Body mass index is 28 kg/m².  Body surface area is 2.05 meters squared.    I/O last 3 completed shifts:  In: 6678 [P.O.:750; I.V.:2733; IV Piggyback:3194.9]  Out: 6100 [Urine:6100]     Physical Exam  Vitals and nursing note reviewed.   Constitutional:       Appearance: Normal appearance.   Cardiovascular:      Rate and Rhythm: Normal rate and regular rhythm.      Pulses: Normal pulses.      Heart sounds: Normal heart sounds.   Pulmonary:      Breath sounds: Normal breath sounds.   Abdominal:      Palpations: Abdomen is soft.   Musculoskeletal:      Right lower leg: No edema.      Left lower leg: No edema.   Neurological:      Mental Status: He is alert and oriented to person, place, and time.   Psychiatric:         Behavior: Behavior normal.          Significant Labs: reviewed  BMP  Lab Results   Component Value Date     11/01/2024    K 4.9 11/01/2024     11/01/2024    CO2 23 11/01/2024    BUN 62 (H) 11/01/2024    CREATININE 2.1 (H) 11/01/2024    CALCIUM 8.7 11/01/2024    ANIONGAP 8 11/01/2024    EGFRNORACEVR 34 (A) 11/01/2024     Lab Results   Component Value Date    WBC 6.84 11/01/2024    HGB 10.8 (L) 11/01/2024    HCT 33.2 (L) 11/01/2024    MCV 86 11/01/2024     (L) 11/01/2024         Significant Imaging: reviewed      Assessment/Plan:     68 yp male with CARINA after presenting with AF c RBR:    CARINA (acute kidney injury)  CARINA is resolving, Cr lower daily. Cr is baseline appears normal  CARINA likely was due to prerenal azotemia, secondary to AF, decreased renal perfusion.  K is normal  Na normal  Ca normal  Acid base no issues  Will monitor    Atrial fibrillation with RVR  Cardiac issues reviewed.  Presented with AF c RVR  Diastolic CHF, CAD    Management of AF reviewed  S/p amiodarone drip, followed by PO  Heparin drip  On beta blockers  On xarelto    Plans and  recommendations:  As discussed above  Total time spent 40 minutes including time needed to review the records, the   patient evaluation, documentation, face-to-face discussion with the patient,   more than 50% of the time was spent on coordination of care and counseling.    Level V visit.      Thank you for your consult.   Lashawn Hamilton MD  Nephrology  [facility] - Telemetry ([facility])

## 2024-11-01 NOTE — ASSESSMENT & PLAN NOTE
Hyperkalemia is likely due to CARINA.The patients most recent potassium results are listed below.  Recent Labs     10/30/24  0908 10/31/24  0436 11/01/24  0420   K 4.9 4.3 4.9       Plan  - Monitor for arrhythmias with EKG and/or continuous telemetry.   - resolved

## 2024-11-02 LAB
ALBUMIN SERPL BCP-MCNC: 3.4 G/DL (ref 3.5–5.2)
ALP SERPL-CCNC: 37 U/L (ref 40–150)
ALT SERPL W/O P-5'-P-CCNC: 13 U/L (ref 10–44)
ANION GAP SERPL CALC-SCNC: 11 MMOL/L (ref 8–16)
AST SERPL-CCNC: 25 U/L (ref 10–40)
BASOPHILS # BLD AUTO: 0.02 K/UL (ref 0–0.2)
BASOPHILS NFR BLD: 0.3 % (ref 0–1.9)
BILIRUB SERPL-MCNC: 0.5 MG/DL (ref 0.1–1)
BUN SERPL-MCNC: 41 MG/DL (ref 8–23)
CALCIUM SERPL-MCNC: 9.3 MG/DL (ref 8.7–10.5)
CHLORIDE SERPL-SCNC: 107 MMOL/L (ref 95–110)
CO2 SERPL-SCNC: 22 MMOL/L (ref 23–29)
CREAT SERPL-MCNC: 2.1 MG/DL (ref 0.5–1.4)
DIFFERENTIAL METHOD BLD: ABNORMAL
EOSINOPHIL # BLD AUTO: 0.2 K/UL (ref 0–0.5)
EOSINOPHIL NFR BLD: 2.5 % (ref 0–8)
ERYTHROCYTE [DISTWIDTH] IN BLOOD BY AUTOMATED COUNT: 14.1 % (ref 11.5–14.5)
EST. GFR  (NO RACE VARIABLE): 34 ML/MIN/1.73 M^2
GLUCOSE SERPL-MCNC: 82 MG/DL (ref 70–110)
HCT VFR BLD AUTO: 33.7 % (ref 40–54)
HGB BLD-MCNC: 10.8 G/DL (ref 14–18)
IMM GRANULOCYTES # BLD AUTO: 0.04 K/UL (ref 0–0.04)
IMM GRANULOCYTES NFR BLD AUTO: 0.6 % (ref 0–0.5)
LYMPHOCYTES # BLD AUTO: 1.1 K/UL (ref 1–4.8)
LYMPHOCYTES NFR BLD: 15.2 % (ref 18–48)
MAGNESIUM SERPL-MCNC: 1.8 MG/DL (ref 1.6–2.6)
MCH RBC QN AUTO: 28.2 PG (ref 27–31)
MCHC RBC AUTO-ENTMCNC: 32 G/DL (ref 32–36)
MCV RBC AUTO: 88 FL (ref 82–98)
MONOCYTES # BLD AUTO: 0.7 K/UL (ref 0.3–1)
MONOCYTES NFR BLD: 10.3 % (ref 4–15)
NEUTROPHILS # BLD AUTO: 4.9 K/UL (ref 1.8–7.7)
NEUTROPHILS NFR BLD: 71.1 % (ref 38–73)
NRBC BLD-RTO: 0 /100 WBC
OHS QRS DURATION: 118 MS
OHS QTC CALCULATION: 532 MS
PHOSPHATE SERPL-MCNC: 3.2 MG/DL (ref 2.7–4.5)
PLATELET # BLD AUTO: 115 K/UL (ref 150–450)
PMV BLD AUTO: 10.5 FL (ref 9.2–12.9)
POTASSIUM SERPL-SCNC: 5 MMOL/L (ref 3.5–5.1)
PROT SERPL-MCNC: 6.7 G/DL (ref 6–8.4)
RBC # BLD AUTO: 3.83 M/UL (ref 4.6–6.2)
SODIUM SERPL-SCNC: 140 MMOL/L (ref 136–145)
WBC # BLD AUTO: 6.91 K/UL (ref 3.9–12.7)

## 2024-11-02 PROCEDURE — 99233 SBSQ HOSP IP/OBS HIGH 50: CPT | Mod: ,,, | Performed by: INTERNAL MEDICINE

## 2024-11-02 PROCEDURE — 97530 THERAPEUTIC ACTIVITIES: CPT

## 2024-11-02 PROCEDURE — 25000003 PHARM REV CODE 250: Performed by: PHYSICIAN ASSISTANT

## 2024-11-02 PROCEDURE — 25000003 PHARM REV CODE 250: Performed by: SPECIALIST

## 2024-11-02 PROCEDURE — 85025 COMPLETE CBC W/AUTO DIFF WBC: CPT | Performed by: NURSE PRACTITIONER

## 2024-11-02 PROCEDURE — 94640 AIRWAY INHALATION TREATMENT: CPT

## 2024-11-02 PROCEDURE — 80053 COMPREHEN METABOLIC PANEL: CPT | Performed by: NURSE PRACTITIONER

## 2024-11-02 PROCEDURE — 36415 COLL VENOUS BLD VENIPUNCTURE: CPT | Performed by: NURSE PRACTITIONER

## 2024-11-02 PROCEDURE — 94761 N-INVAS EAR/PLS OXIMETRY MLT: CPT

## 2024-11-02 PROCEDURE — 25000242 PHARM REV CODE 250 ALT 637 W/ HCPCS: Performed by: NURSE PRACTITIONER

## 2024-11-02 PROCEDURE — 27000221 HC OXYGEN, UP TO 24 HOURS

## 2024-11-02 PROCEDURE — 99232 SBSQ HOSP IP/OBS MODERATE 35: CPT | Mod: ,,, | Performed by: INTERNAL MEDICINE

## 2024-11-02 PROCEDURE — 25000003 PHARM REV CODE 250: Performed by: NURSE PRACTITIONER

## 2024-11-02 PROCEDURE — 97166 OT EVAL MOD COMPLEX 45 MIN: CPT

## 2024-11-02 PROCEDURE — 83735 ASSAY OF MAGNESIUM: CPT | Performed by: NURSE PRACTITIONER

## 2024-11-02 PROCEDURE — 84100 ASSAY OF PHOSPHORUS: CPT | Performed by: NURSE PRACTITIONER

## 2024-11-02 PROCEDURE — 21400001 HC TELEMETRY ROOM

## 2024-11-02 RX ADMIN — MUPIROCIN: 20 OINTMENT TOPICAL at 09:11

## 2024-11-02 RX ADMIN — SENNOSIDES AND DOCUSATE SODIUM 1 TABLET: 50; 8.6 TABLET ORAL at 10:11

## 2024-11-02 RX ADMIN — ARFORMOTEROL TARTRATE 15 MCG: 15 SOLUTION RESPIRATORY (INHALATION) at 08:11

## 2024-11-02 RX ADMIN — ACETAMINOPHEN 650 MG: 325 TABLET ORAL at 10:11

## 2024-11-02 RX ADMIN — METOPROLOL TARTRATE 25 MG: 25 TABLET, FILM COATED ORAL at 05:11

## 2024-11-02 RX ADMIN — AMIODARONE HYDROCHLORIDE 200 MG: 200 TABLET ORAL at 10:11

## 2024-11-02 RX ADMIN — METOPROLOL TARTRATE 25 MG: 25 TABLET, FILM COATED ORAL at 12:11

## 2024-11-02 RX ADMIN — MUPIROCIN: 20 OINTMENT TOPICAL at 10:11

## 2024-11-02 RX ADMIN — BUDESONIDE INHALATION 0.5 MG: 0.5 SUSPENSION RESPIRATORY (INHALATION) at 08:11

## 2024-11-02 RX ADMIN — RIVAROXABAN 15 MG: 15 TABLET, FILM COATED ORAL at 05:11

## 2024-11-02 RX ADMIN — FAMOTIDINE 20 MG: 20 TABLET ORAL at 10:11

## 2024-11-02 NOTE — ASSESSMENT & PLAN NOTE
Advise to check CVP     Ok to hold ToprolXl and aldactone due to low BP     10/31/24  -Monitor CVP  -TTE with normal EF, moderately reduced RV systolic function, pulm HTN  -BB resumed    11/1/24  -See above    11/02/2024  Euvolemic   DASH

## 2024-11-02 NOTE — PLAN OF CARE
OT elisha completed. Sup>sit mod A, squat>pivot to bedside chair with max A of 2. Recommending moderate intensity intervention at d/c.

## 2024-11-02 NOTE — SUBJECTIVE & OBJECTIVE
Interval History: f/u  patient still reporting leg pain.     Review of Systems  Objective:     Vital Signs (Most Recent):  Temp: 99.2 °F (37.3 °C) (11/02/24 1538)  Pulse: 73 (11/02/24 1728)  Resp: 18 (11/02/24 1538)  BP: (!) 112/58 (11/02/24 1728)  SpO2: 99 % (11/02/24 1538) Vital Signs (24h Range):  Temp:  [98.5 °F (36.9 °C)-100.1 °F (37.8 °C)] 99.2 °F (37.3 °C)  Pulse:  [58-80] 73  Resp:  [18-19] 18  SpO2:  [95 %-99 %] 99 %  BP: ()/(50-77) 112/58     Weight: 86 kg (189 lb 9.5 oz)  Body mass index is 28 kg/m².    Intake/Output Summary (Last 24 hours) at 11/2/2024 1737  Last data filed at 11/2/2024 1732  Gross per 24 hour   Intake 720 ml   Output 2100 ml   Net -1380 ml         Physical Exam  HENT:      Head: Normocephalic and atraumatic.   Cardiovascular:      Rate and Rhythm: Normal rate and regular rhythm.      Heart sounds: No murmur heard.  Pulmonary:      Effort: Pulmonary effort is normal. No respiratory distress.      Breath sounds: Normal breath sounds. No wheezing.   Abdominal:      General: Bowel sounds are normal. There is no distension.      Palpations: Abdomen is soft.      Tenderness: There is no abdominal tenderness.   Musculoskeletal:         General: No swelling.   Skin:     General: Skin is warm and dry.   Neurological:      Mental Status: He is alert and oriented to person, place, and time. Mental status is at baseline.             Significant Labs: All pertinent labs within the past 24 hours have been reviewed.  Recent Lab Results         11/02/24  0548        Albumin 3.4       ALP 37       ALT 13       Anion Gap 11       AST 25       Baso # 0.02       Basophil % 0.3       BILIRUBIN TOTAL 0.5  Comment: For infants and newborns, interpretation of results should be based  on gestational age, weight and in agreement with clinical  observations.    Premature Infant recommended reference ranges:  Up to 24 hours.............<8.0 mg/dL  Up to 48 hours............<12.0 mg/dL  3-5  days..................<15.0 mg/dL  6-29 days.................<15.0 mg/dL         BUN 41       Calcium 9.3       Chloride 107       CO2 22       Creatinine 2.1       Differential Method Automated       eGFR 34       Eos # 0.2       Eos % 2.5       Glucose 82       Gran # (ANC) 4.9       Gran % 71.1       Hematocrit 33.7       Hemoglobin 10.8       Immature Grans (Abs) 0.04  Comment: Mild elevation in immature granulocytes is non specific and   can be seen in a variety of conditions including stress response,   acute inflammation, trauma and pregnancy. Correlation with other   laboratory and clinical findings is essential.         Immature Granulocytes 0.6       Lymph # 1.1       Lymph % 15.2       Magnesium  1.8       MCH 28.2       MCHC 32.0       MCV 88       Mono # 0.7       Mono % 10.3       MPV 10.5       nRBC 0       Phosphorus Level 3.2       Platelet Count 115       Potassium 5.0       PROTEIN TOTAL 6.7       RBC 3.83       RDW 14.1       Sodium 140       WBC 6.91               Significant Imaging: I have reviewed all pertinent imaging results/findings within the past 24 hours.

## 2024-11-02 NOTE — PT/OT/SLP PROGRESS
"Physical Therapy  Treatment    Layton Abrams   MRN: [id number]   Admitting Diagnosis: Atrial fibrillation with RVR    PT Received On: 11/02/24  PT Start Time: 0850     PT Stop Time: 0915    PT Total Time (min): 25 min       Billable Minutes:  Therapeutic Activity 25    Treatment Type: Treatment  PT/PTA: PT     Number of PTA visits since last PT visit: 0       General Precautions: Standard, fall  Orthopedic Precautions: N/A  Braces: N/A  Respiratory Status: Room air    Spiritual, Cultural Beliefs, Taoism Practices, Values that Affect Care: no    Subjective:  Communicated with RN prior to session.  Pt reports that he really wants to try to get OOB today.     Pain/Comfort  Pain Rating 1: 8/10  Location - Side 1:  (B LE)  Pain Addressed 1: Reposition, Nurse notified  Pain Rating Post-Intervention 1: 9/10    Objective:   Patient found with: peripheral IV, bed alarm, telemetry, bustillo catheter      Treatment and Education:  Treatment completed. Pt required mod A for supine to sitting EOB with reports of intense pain in B legs with transition. Pt sat EOB x 5 minutes with SBA and performed scooting to EOB with CGA and much increased time for task performance. Pt attempted STS, however, was unable and required max A x 2 for squat pivot transfer to chair from bed.     Pt educated on role of PT in acute care and POC. Educated on importance of OOB activities, activity pacing, and HEP (marching/hip flex, hip abd, heel slides/LAQ, quad sets, ankle pumps) in order to maintain/regain strength. Encouraged to sit up in chair for all meals. Educated on proper use of RW for safety and to reduce risk of falling. Educated on "call don't fall" policy and increased risk of falling due to weakness, instructed to utilize call bell for assistance with all transfers. Pt agreeable to all requests.       AM-PAC 6 CLICK MOBILITY  How much help from another person does this patient currently need?   1 = Unable, Total/Dependent Assistance  2 = A " lot, Maximum/Moderate Assistance  3 = A little, Minimum/Contact Guard/Supervision  4 = None, Modified [place]/Independent    Turning over in bed (including adjusting bedclothes, sheets and blankets)?: 2  Sitting down on and standing up from a chair with arms (e.g., wheelchair, bedside commode, etc.): 1  Moving from lying on back to sitting on the side of the bed?: 2  Moving to and from a bed to a chair (including a wheelchair)?: 2  Need to walk in hospital room?: 1  Climbing 3-5 steps with a railing?: 1  Basic Mobility Total Score: 9    AM-PAC Raw Score CMS G-Code Modifier Level of Impairment Assistance   6 % Total / Unable   7 - 9 CM 80 - 100% Maximal Assist   10 - 14 CL 60 - 80% Moderate Assist   15 - 19 CK 40 - 60% Moderate Assist   20 - 22 CJ 20 - 40% Minimal Assist   23 CI 1-20% SBA / CGA   24 CH 0% Independent/ Mod I     Patient left up in chair with all lines intact, call button in reach, chair alarm on, and RN notified.    Assessment:  Layton Abrams is a 68 y.o. male with a medical diagnosis of Atrial fibrillation with RVR and presents with weakness, impaired endurance, impaired cognition, decreased safety awareness, gait instability, impaired functional mobility, decreased lower extremity function, impaired balance, impaired cardiopulmonary response to activity, pain, decreased coordination.  Pt with poor ability to wb through B Legs and unable to WB for transfer. Max A x 2 required for transfer to chair and reports of significant B LE pain with Transfer.     Rehab potential is fair.    Activity tolerance: Fair    Discharge recommendations: Moderate Intensity Therapy      Barriers to discharge:      Equipment recommendations: to be determined by next level of care     GOALS:   Multidisciplinary Problems       Physical Therapy Goals          Problem: Physical Therapy    Goal Priority Disciplines Outcome Interventions   Physical Therapy Goal     PT, PT/OT Progressing    Description: Goals to be  met by 11/15/24.  1. Pt will complete bed mobility MIN A.  2. Pt will complete sit to stand MIN A.  3. Pt will ambulate 50ft MIN A using RW.  4. Pt will increase AMPAC score by 2 points to progress functional mobility.                       PLAN:    Patient to be seen 3 x/week to address the above listed problems via gait training, therapeutic activities, therapeutic exercises, neuromuscular re-education  Plan of Care expires: 11/15/24  Plan of Care reviewed with: patient         11/02/2024

## 2024-11-02 NOTE — PROGRESS NOTES
"[facility] ([facility])  Cardiology  Progress Note    Patient Name: Layton Abrams  MRN: [id number]  Admission Date: 10/29/2024  Hospital Length of Stay: 4 days  Code Status: Full Code   Attending Physician: Johnna Irizarry MD   Primary Care Physician: Nellie Cadena MD  Expected Discharge Date:   Principal Problem:Atrial fibrillation with RVR    Subjective:     Hospital Course:   Cardiology consult for afib with RVR.  PMH chronic afib, HTN; CAD; HFrEF (4/2024 30-40%EF with ungraded diastolic dysfunction and "giant" rt heart; COPD; h/o DVT earlier this year on xarelto; HCV; prior CVA x 5. Prior cardiologist Dr. Cowan.  Admitted for ARF low BP and afib with RVR. Baseline Cr 0.8 and was 5 and improved to 4.7 after IVF  Multiple ulcers and abscess identified by wound care team  Now AFIB VR controlled, BP at 90/60 with nuke gtt   No active bleeding. On heparin gtt.   EKG AFIB RBBB    10/31/24-Patient seen and examined today, lying in bed. Appears weak/deconditioned. States he feels well. No CV complaints. Noted to be tachycardic this AM, given dose of IV digoxin with improvement. Off pressor support. Labs reviewed. Creatinine down to 2.5. Mg 1.3, being repleted. TTE with normal EF, moderately reduced RV function, pulmonary HTN. BB initiated for HR control.    11/1/24-Patient seen and examined today. Awake, alert. States he feels well. Complains of BLE pain. Appears to have converted to SR, EKG pending. Labs reviewed. Creatinine down to 2.1.     11/02/2024  On SR and frequent PACs, improved SOB. No abd pain Cr 2.1 stable I&O -3.5 liters since admission          ROS  Objective:     Vital Signs (Most Recent):  Temp: 99.4 °F (37.4 °C) (11/02/24 1114)  Pulse: 80 (11/02/24 1228)  Resp: 18 (11/02/24 1114)  BP: (!) 92/59 (11/02/24 1228)  SpO2: 97 % (11/02/24 1114) Vital Signs (24h Range):  Temp:  [98.5 °F (36.9 °C)-100.1 °F (37.8 °C)] 99.4 °F (37.4 °C)  Pulse:  [60-80] 80  Resp:  [18-19] 18  SpO2:  [95 %-98 %] 97 " "%  BP: ()/(50-77) 92/59     Weight: 86 kg (189 lb 9.5 oz)  Body mass index is 28 kg/m².     SpO2: 97 %         Intake/Output Summary (Last 24 hours) at 11/2/2024 1335  Last data filed at 11/2/2024 1216  Gross per 24 hour   Intake 480 ml   Output 2050 ml   Net -1570 ml       Lines/Drains/Airways       Drain  Duration                  Urethral Catheter 10/30/24 1710 Non-latex;Temperature probe 16 Fr. 2 days              Peripheral Intravenous Line  Duration                  Peripheral IV - Single Lumen 11/01/24 2224 22 G Anterior;Left Forearm <1 day                       Physical Exam  Vitals and nursing note reviewed.   Constitutional:       Appearance: He is ill-appearing.   HENT:      Head: Normocephalic and atraumatic.   Eyes:      Pupils: Pupils are equal, round, and reactive to light.   Cardiovascular:      Rate and Rhythm: Normal rate and regular rhythm. Frequent Extrasystoles are present.     Heart sounds: S1 normal and S2 normal. No murmur heard.  Pulmonary:      Comments: Diminished  Abdominal:      Palpations: Abdomen is soft.   Neurological:      Mental Status: He is oriented to person, place, and time.      Comments: Residual deficits from prior CVA   Psychiatric:         Mood and Affect: Mood normal.            Significant Labs: ABG: No results for input(s): "PH", "PCO2", "HCO3", "POCSATURATED", "BE" in the last 48 hours., Blood Culture: No results for input(s): "LABBLOO" in the last 48 hours., BMP:   Recent Labs   Lab 11/01/24  0420 11/02/24  0548   GLU 87 82    140   K 4.9 5.0    107   CO2 23 22*   BUN 62* 41*   CREATININE 2.1* 2.1*   CALCIUM 8.7 9.3   MG 1.7 1.8   , CMP   Recent Labs   Lab 11/01/24  0420 11/02/24  0548    140   K 4.9 5.0    107   CO2 23 22*   GLU 87 82   BUN 62* 41*   CREATININE 2.1* 2.1*   CALCIUM 8.7 9.3   PROT 5.7* 6.7   ALBUMIN 2.9* 3.4*   BILITOT 0.4 0.5   ALKPHOS 37* 37*   AST 24 25   ALT 10 13   ANIONGAP 8 11   , CBC   Recent Labs   Lab " "11/01/24  0420 11/02/24  0548   WBC 6.84 6.91   HGB 10.8* 10.8*   HCT 33.2* 33.7*   * 115*   , INR No results for input(s): "INR", "PROTIME" in the last 48 hours., Lipid Panel No results for input(s): "CHOL", "HDL", "LDLCALC", "TRIG", "CHOLHDL" in the last 48 hours., and Troponin No results for input(s): "TROPONINI" in the last 48 hours.    Significant Imaging: EKG:    Assessment and Plan:       * Atrial fibrillation with RVR  AFIB now V controlled    Continue amio gtt for rate control  Continue heparin gtt  Ok for gentle hydratiob and maryjo gtt for ARF and ? Septal shock     10/31/24  -Tachycardic this AM, given dose of IV digoxin  -EKG pending  -Amiodarone switched to po   -Off pressor support, Lopressor 25 mg q 6 hours initiated  -On Xarelto for CVA prophylaxis     11/1/24  -Appears to have converted to SR, EKG pending  -Continue po amio, BB  -Continue Xarelto    11/02/2024  On SR  Continue amiodarone BB and OAC     HFrEF (heart failure with reduced ejection fraction)  Advise to check CVP     Ok to hold ToprolXl and aldactone due to low BP     10/31/24  -Monitor CVP  -TTE with normal EF, moderately reduced RV systolic function, pulm HTN  -BB resumed    11/1/24  -See above    11/02/2024  Euvolemic   DASH      Hypotension  Continue IVF    10/31/24  -Improved, off pressor support    Hyperkalemia  Now K normalized    CARINA (acute kidney injury)  On IVF    10/31/24  -Improved to 2.5, nephrology following    11/1/24  -Creatinine down to 2.1        VTE Risk Mitigation (From admission, onward)           Ordered     rivaroxaban tablet 15 mg  With dinner         10/31/24 0912                    Yosef Ocasio MD  Cardiology  [facility] (Hospital)    "

## 2024-11-02 NOTE — SUBJECTIVE & OBJECTIVE
Interval History: Pt was seen and examined. Labs and meds reviewed. Discussed with other providers. No new c/o's, no SOB, no CP.    Review of patient's allergies indicates:  No Known Allergies  Current Facility-Administered Medications   Medication Frequency    acetaminophen tablet 650 mg Q6H PRN    amiodarone tablet 200 mg Daily    arformoteroL nebulizer solution 15 mcg BID    budesonide nebulizer solution 0.5 mg Q12H    famotidine tablet 20 mg Daily    influenza (adjuvanted) (Fluad) 45 mcg/0.5 mL IM vaccine (> or = 66 yo) 0.5 mL Prior to discharge    metoprolol tartrate (LOPRESSOR) tablet 25 mg Q6H    mupirocin 2 % ointment BID    ondansetron injection 8 mg Q6H PRN    pneumoc 20-haylee conj-dip cr(PF) (PREVNAR-20 (PF)) injection Syrg 0.5 mL vaccine x 1 dose    rivaroxaban tablet 15 mg Daily with dinner    senna-docusate 8.6-50 mg per tablet 1 tablet BID       Objective:     Vital Signs (Most Recent):  Temp: 99.4 °F (37.4 °C) (11/02/24 1114)  Pulse: 80 (11/02/24 1228)  Resp: 18 (11/02/24 1114)  BP: (!) 92/59 (11/02/24 1228)  SpO2: 97 % (11/02/24 1114) Vital Signs (24h Range):  Temp:  [98.5 °F (36.9 °C)-100.1 °F (37.8 °C)] 99.4 °F (37.4 °C)  Pulse:  [60-80] 80  Resp:  [18-19] 18  SpO2:  [95 %-98 %] 97 %  BP: ()/(50-77) 92/59     Weight: 86 kg (189 lb 9.5 oz) (10/31/24 0600)  Body mass index is 28 kg/m².  Body surface area is 2.05 meters squared.    I/O last 3 completed shifts:  In: -   Out: 4500 [Urine:4500]     Physical Exam  Vitals and nursing note reviewed.   Constitutional:       Appearance: Normal appearance.   Cardiovascular:      Rate and Rhythm: Normal rate and regular rhythm.      Pulses: Normal pulses.      Heart sounds: Normal heart sounds.   Pulmonary:      Effort: Pulmonary effort is normal.      Breath sounds: Normal breath sounds.   Abdominal:      Tenderness: There is no abdominal tenderness.   Musculoskeletal:      Right lower leg: No edema.      Left lower leg: No edema.   Skin:     General:  Skin is warm and dry.   Neurological:      Mental Status: He is alert and oriented to person, place, and time.   Psychiatric:         Behavior: Behavior normal.          Significant Labs: reviewed  BMP  Lab Results   Component Value Date     11/02/2024    K 5.0 11/02/2024     11/02/2024    CO2 22 (L) 11/02/2024    BUN 41 (H) 11/02/2024    CREATININE 2.1 (H) 11/02/2024    CALCIUM 9.3 11/02/2024    ANIONGAP 11 11/02/2024    EGFRNORACEVR 34 (A) 11/02/2024     Lab Results   Component Value Date    WBC 6.91 11/02/2024    HGB 10.8 (L) 11/02/2024    HCT 33.7 (L) 11/02/2024    MCV 88 11/02/2024     (L) 11/02/2024         Significant Imaging: reviewed

## 2024-11-02 NOTE — ASSESSMENT & PLAN NOTE
CARINA is likely due to  multiple factors, HF, afib, hypotension . Baseline creatinine is  0.7-1 . Most recent creatinine and eGFR are listed below.  Recent Labs     10/31/24  0436 11/01/24  0420 11/02/24  0548   CREATININE 2.5* 2.1* 2.1*   EGFRNORACEVR 27* 34* 34*        Plan  - CARINA is improving  - Avoid nephrotoxins and renally dose meds for GFR listed above  - Monitor urine output, serial BMP, and adjust therapy as needed  - appreciate nephrology

## 2024-11-02 NOTE — ASSESSMENT & PLAN NOTE
Hyperkalemia is likely due to CARINA.The patients most recent potassium results are listed below.  Recent Labs     10/31/24  0436 11/01/24  0420 11/02/24  0548   K 4.3 4.9 5.0       Plan  - Monitor for arrhythmias with EKG and/or continuous telemetry.   - may need potassium binder

## 2024-11-02 NOTE — PROGRESS NOTES
[facility] Tele[facility])  Nephrology  Progress Note    Patient Name: Layton Abrams  MRN: [id number]  Admission Date: 10/29/2024  Hospital Length of Stay: 4 days  Attending Provider: Johnna Irizarry MD   Primary Care Physician: Nellie Cadena MD  Principal Problem:Atrial fibrillation with RVR    Subjective:     HPI: Noted    Interval History: Pt was seen and examined. Labs and meds reviewed. Discussed with other providers. No new c/o's, no SOB, no CP.    Review of patient's allergies indicates:  No Known Allergies  Current Facility-Administered Medications   Medication Frequency    acetaminophen tablet 650 mg Q6H PRN    amiodarone tablet 200 mg Daily    arformoteroL nebulizer solution 15 mcg BID    budesonide nebulizer solution 0.5 mg Q12H    famotidine tablet 20 mg Daily    influenza (adjuvanted) (Fluad) 45 mcg/0.5 mL IM vaccine (> or = 64 yo) 0.5 mL Prior to discharge    metoprolol tartrate (LOPRESSOR) tablet 25 mg Q6H    mupirocin 2 % ointment BID    ondansetron injection 8 mg Q6H PRN    pneumoc 20-haylee conj-dip cr(PF) (PREVNAR-20 (PF)) injection Syrg 0.5 mL vaccine x 1 dose    rivaroxaban tablet 15 mg Daily with dinner    senna-docusate 8.6-50 mg per tablet 1 tablet BID       Objective:     Vital Signs (Most Recent):  Temp: 99.4 °F (37.4 °C) (11/02/24 1114)  Pulse: 80 (11/02/24 1228)  Resp: 18 (11/02/24 1114)  BP: (!) 92/59 (11/02/24 1228)  SpO2: 97 % (11/02/24 1114) Vital Signs (24h Range):  Temp:  [98.5 °F (36.9 °C)-100.1 °F (37.8 °C)] 99.4 °F (37.4 °C)  Pulse:  [60-80] 80  Resp:  [18-19] 18  SpO2:  [95 %-98 %] 97 %  BP: ()/(50-77) 92/59     Weight: 86 kg (189 lb 9.5 oz) (10/31/24 0600)  Body mass index is 28 kg/m².  Body surface area is 2.05 meters squared.    I/O last 3 completed shifts:  In: -   Out: 4500 [Urine:4500]     Physical Exam  Vitals and nursing note reviewed.   Constitutional:       Appearance: Normal appearance.   Cardiovascular:      Rate and Rhythm: Normal rate and regular  rhythm.      Pulses: Normal pulses.      Heart sounds: Normal heart sounds.   Pulmonary:      Effort: Pulmonary effort is normal.      Breath sounds: Normal breath sounds.   Abdominal:      Tenderness: There is no abdominal tenderness.   Musculoskeletal:      Right lower leg: No edema.      Left lower leg: No edema.   Skin:     General: Skin is warm and dry.   Neurological:      Mental Status: He is alert and oriented to person, place, and time.   Psychiatric:         Behavior: Behavior normal.          Significant Labs: reviewed  BMP  Lab Results   Component Value Date     11/02/2024    K 5.0 11/02/2024     11/02/2024    CO2 22 (L) 11/02/2024    BUN 41 (H) 11/02/2024    CREATININE 2.1 (H) 11/02/2024    CALCIUM 9.3 11/02/2024    ANIONGAP 11 11/02/2024    EGFRNORACEVR 34 (A) 11/02/2024     Lab Results   Component Value Date    WBC 6.91 11/02/2024    HGB 10.8 (L) 11/02/2024    HCT 33.7 (L) 11/02/2024    MCV 88 11/02/2024     (L) 11/02/2024         Significant Imaging: reviewed      Assessment/Plan:     67 y/o male with CARINA after presenting with AF c RBR:     CARINA (acute kidney injury)  S Cr stable. Slowly improving, the improvement is not faster because low  Baseline Cr is normal  CARINA is resolving  CARINA likely was due to prerenal azotemia, secondary to AF, decreased renal perfusion.  K is normal  Na normal  Ca normal  Acid base no issues    HTN: BP is low  Pt is not on any major BP meds     Atrial fibrillation with RVR  Cardiac issues reviewed.  Presented with AF c RVR  Diastolic CHF, CAD     Management of AF reviewed  S/p amiodarone drip, followed by PO  Heparin drip  On beta blockers  On xarelto     Plans and recommendations:  As discussed above  Total time spent 40 minutes including time needed to review the records, the   patient evaluation, documentation, face-to-face discussion with the patient,   more than 50% of the time was spent on coordination of care and counseling.    Level V  visit.        Thank you for your consult.     Lashawn Hamilton MD  Nephrology  [facility] ([facility])

## 2024-11-02 NOTE — PT/OT/SLP EVAL
"Occupational Therapy Evaluation and Treatment    Name: Layton Abrams  MRN: [id number]  Admitting Diagnosis: Atrial fibrillation with RVR  Recent Surgery: * No surgery found *      Recommendations:     Discharge Recommendations: Moderate Intensity Therapy  Level of Assistance Recommended: 24 hours significant assistance  Discharge Equipment Recommendations: to be determined by next level of care  Barriers to discharge: Decreased caregiver support    Assessment:     Layton Abrams is a 68 y.o. male with a medical diagnosis of Atrial fibrillation with RVR. He presents with performance deficits affecting function including weakness, impaired endurance, impaired self care skills, impaired functional mobility, impaired balance, pain, impaired cardiopulmonary response to activity, decreased lower extremity function, decreased safety awareness, edema.    Rehab Prognosis: Fair; patient would benefit from acute OT services to address these deficits and reach maximum level of function.    Plan:     Patient to be seen 2 x/week to address the above listed problems via therapeutic activities, therapeutic exercises, self-care/home management  Plan of Care Expires: 11/16/24  Plan of Care Reviewed with: patient    Subjective     Chief Complaint: Reported "I can't stand on my legs."  Patient Comments/Goals: increase independence and minimize pain  Pain/Comfort:  Pain Rating 1: 8/10  Location - Side 1: Bilateral  Location 1: leg (with all WB- resolves at rest)  Pain Addressed 1:  (activity pacing)    Social History:  Living Environment: Patient lives with their 90 year old father  in a single story home with  1 step to enter.  Prior Level of Function: Prior to admission, patient was modified independent with ADLs and community distance ambulation via RW.  Roles and Routines: Patient was driving and retired prior to admission.  Equipment Used at Home: walker, rolling, bedside commode  DME owned (not currently used): none  Assistance " Upon Discharge:  none reported    Objective:     Communicated with nurse, Bailey, prior to session. Patient found supine with peripheral IV, telemetry, bed alarm, bustillo catheter upon OT entry to room.    General Precautions: Standard, fall   Orthopedic Precautions: N/A   Braces: N/A    Respiratory Status: Room air    Occupational Performance    Gait belt applied - Yes    Bed Mobility:   Supine to sit from left side of bed with moderate assistance  Seated anterior scooting with CGA and significant increase in time    Functional Mobility/Transfers:  Attempted x2 trials from elevated hospital bed to complete sit>stand with RW and max A of 2  Patient unable to tolerate WB through B LE  Squat>pivot to bedside chair with max A of 2    Activities of Daily Living:  Upper Body Dressing: moderate assistance  Lower Body Dressing: total assistance    Cognitive/Visual Perceptual:  Cognitive/Psychosocial Skills:    -     Oriented to: Person, Place, Time, Situation  -     Follows Commands/attention: Follows one-step commands    Physical Exam:  Balance:    -     Sitting: contact guard assistance  -     Standing: absent  Upper Extremity Range of Motion:     -       Right Upper Extremity: WFL  -       Left Upper Extremity: WFL  Upper Extremity Strength:    -       Right Upper Extremity: Deficits: grossly 4+/5  -       Left Upper Extremity: Deficits: grossly 4+/5   Strength:    -       Right Upper Extremity: WFL  -       Left Upper Extremity: WFL    AMPAC 6 Click ADL:  AMPAC Total Score: 14    Treatment & Education:  Therapist provided facilitation and instruction of proper body mechanics, energy conservation, and fall prevention strategies during tasks listed above  Patient educated on role of OT, POC, and goals for therapy  Patient educated on importance of OOB activities with staff member assistance and sitting OOB majority of the day  Encouraged completion of B UE AROM therex throughout the day to increase functional strength  and activity tolerance needed for ADL completion.    Patient left up in chair with all lines intact, call button in reach, and chair alarm on.    GOALS:   Multidisciplinary Problems       Occupational Therapy Goals          Problem: Occupational Therapy    Goal Priority Disciplines Outcome Interventions   Occupational Therapy Goal     OT, PT/OT     Description: Goals to be met by: 11/16/24     Patient will increase functional independence with ADLs by performing:    Toileting from bedside commode with Minimal Assistance for hygiene and clothing management.   Toilet transfer to bedside commode with Minimal Assistance.  Increased functional strength in B UE to grossly WFL.                         History:     Past Medical History:   Diagnosis Date    Arthritis     CHF (congestive heart failure)     COPD (chronic obstructive pulmonary disease)     Hypertension     Liver disease     Stroke        No past surgical history on file.    Time Tracking:     OT Date of Treatment: 11/02/24  OT Start Time: 0915  OT Stop Time: 0940  OT Total Time (min): 25 min    Billable Minutes: Evaluation 15 and Therapeutic Activity 10    Bina Garcia OT  11/2/2024

## 2024-11-02 NOTE — ASSESSMENT & PLAN NOTE
AFIB now V controlled    Continue amio gtt for rate control  Continue heparin gtt  Ok for gentle hydratiob and maryjo gtt for ARF and ? Septal shock     10/31/24  -Tachycardic this AM, given dose of IV digoxin  -EKG pending  -Amiodarone switched to po   -Off pressor support, Lopressor 25 mg q 6 hours initiated  -On Xarelto for CVA prophylaxis     11/1/24  -Appears to have converted to SR, EKG pending  -Continue po amio, BB  -Continue Xarelto    11/02/2024  On SR  Continue amiodarone BB and OAC

## 2024-11-02 NOTE — ASSESSMENT & PLAN NOTE
68 yp male with CARINA after presenting with AF c RBR:     CARINA (acute kidney injury)  CARINA is resolving, Cr lower daily. Cr is baseline appears normal  CARINA likely was due to prerenal azotemia, secondary to AF, decreased renal perfusion.  K is normal  Na normal  Ca normal  Acid base no issues  Will monitor     Atrial fibrillation with RVR  Cardiac issues reviewed.  Presented with AF c RVR  Diastolic CHF, CAD     Management of AF reviewed  S/p amiodarone drip, followed by PO  Heparin drip  On beta blockers  On xarelto     Plans and recommendations:  As discussed above  Total time spent 40 minutes including time needed to review the records, the   patient evaluation, documentation, face-to-face discussion with the patient,   more than 50% of the time was spent on coordination of care and counseling.    Level V visit.

## 2024-11-02 NOTE — PLAN OF CARE
Treatment completed. Pt required mod A for supine to sitting EOB with reports of intense pain in B legs with transition. Pt sat EOB x 5 minutes with SBA and performed scooting to EOB with CGA and much increased time for task performance. Pt attempted STS, however, was unable and required max A x 2 for squat pivot transfer to chair from bed.

## 2024-11-02 NOTE — ASSESSMENT & PLAN NOTE
Patient has paroxysmal (<7 days) atrial fibrillation. Patient is currently in sinus rhythm. [id number] Score: 1. The patients heart rate in the last 24 hours is as follows:  Pulse  Min: 58  Max: 80     Antiarrhythmics  amiodarone tablet 200 mg, Daily, Oral  metoprolol tartrate (LOPRESSOR) tablet 25 mg, Every 6 hours, Oral    Anticoagulants  rivaroxaban tablet 15 mg, With dinner, Oral    Plan  - Replete lytes with a goal of K>4, Mg >2  - Patient is anticoagulated, see medications listed above.  - Patient's afib is currently controlled  - cardiology following

## 2024-11-02 NOTE — SUBJECTIVE & OBJECTIVE
"    ROS  Objective:     Vital Signs (Most Recent):  Temp: 99.4 °F (37.4 °C) (11/02/24 1114)  Pulse: 80 (11/02/24 1228)  Resp: 18 (11/02/24 1114)  BP: (!) 92/59 (11/02/24 1228)  SpO2: 97 % (11/02/24 1114) Vital Signs (24h Range):  Temp:  [98.5 °F (36.9 °C)-100.1 °F (37.8 °C)] 99.4 °F (37.4 °C)  Pulse:  [60-80] 80  Resp:  [18-19] 18  SpO2:  [95 %-98 %] 97 %  BP: ()/(50-77) 92/59     Weight: 86 kg (189 lb 9.5 oz)  Body mass index is 28 kg/m².     SpO2: 97 %         Intake/Output Summary (Last 24 hours) at 11/2/2024 1335  Last data filed at 11/2/2024 1216  Gross per 24 hour   Intake 480 ml   Output 2050 ml   Net -1570 ml       Lines/Drains/Airways       Drain  Duration                  Urethral Catheter 10/30/24 1710 Non-latex;Temperature probe 16 Fr. 2 days              Peripheral Intravenous Line  Duration                  Peripheral IV - Single Lumen 11/01/24 2224 22 G Anterior;Left Forearm <1 day                       Physical Exam  Vitals and nursing note reviewed.   Constitutional:       Appearance: He is ill-appearing.   HENT:      Head: Normocephalic and atraumatic.   Eyes:      Pupils: Pupils are equal, round, and reactive to light.   Cardiovascular:      Rate and Rhythm: Normal rate and regular rhythm. Frequent Extrasystoles are present.     Heart sounds: S1 normal and S2 normal. No murmur heard.  Pulmonary:      Comments: Diminished  Abdominal:      Palpations: Abdomen is soft.   Neurological:      Mental Status: He is oriented to person, place, and time.      Comments: Residual deficits from prior CVA   Psychiatric:         Mood and Affect: Mood normal.            Significant Labs: ABG: No results for input(s): "PH", "PCO2", "HCO3", "POCSATURATED", "BE" in the last 48 hours., Blood Culture: No results for input(s): "LABBLOO" in the last 48 hours., BMP:   Recent Labs   Lab 11/01/24  0420 11/02/24  0548   GLU 87 82    140   K 4.9 5.0    107   CO2 23 22*   BUN 62* 41*   CREATININE 2.1* 2.1* " "  CALCIUM 8.7 9.3   MG 1.7 1.8   , CMP   Recent Labs   Lab 11/01/24  0420 11/02/24  0548    140   K 4.9 5.0    107   CO2 23 22*   GLU 87 82   BUN 62* 41*   CREATININE 2.1* 2.1*   CALCIUM 8.7 9.3   PROT 5.7* 6.7   ALBUMIN 2.9* 3.4*   BILITOT 0.4 0.5   ALKPHOS 37* 37*   AST 24 25   ALT 10 13   ANIONGAP 8 11   , CBC   Recent Labs   Lab 11/01/24  0420 11/02/24  0548   WBC 6.84 6.91   HGB 10.8* 10.8*   HCT 33.2* 33.7*   * 115*   , INR No results for input(s): "INR", "PROTIME" in the last 48 hours., Lipid Panel No results for input(s): "CHOL", "HDL", "LDLCALC", "TRIG", "CHOLHDL" in the last 48 hours., and Troponin No results for input(s): "TROPONINI" in the last 48 hours.    Significant Imaging: EKG:    "

## 2024-11-02 NOTE — PROGRESS NOTES
[facility] Medicine  Progress Note    Patient Name: Layton Abrams  MRN: [id number]  Patient Class: IP- Inpatient   Admission Date: 10/29/2024  Length of Stay: 4 days  Attending Physician: Johnna Irizarry MD  Primary Care Provider: Nellie Cadena MD        Subjective:     Principal Problem:Atrial fibrillation with RVR        HPI:  The patient is 68-year-old male with a history of atrial fibrillation, coronary artery disease with stenting, hypertension, stroke with right-sided deficits, COPD, HCV, left peroneal DVT, Xarelto use, and lower extremity cellulitis presented to [facility] Emergency Department on October 29 with weakness that began on the morning of presentation.  By report he had been feeling well the day prior but did not feel well on October 29.  In the emergency department he had AFib with RVR and hypotension, but oxygenation was stable on room air.  Patient was drowsy but would wake and speak without difficulty.  He reported no chest pain or shortness of breath.  Labs were concerning for CARINA with  and creatinine 5.38.  Potassium was 7.  High sensitivity troponin was in the normal range at 40.  Chest x-ray had clear lungs but did show marked cardiomegaly.  ED attempted cardioversion with the AFib with RVR, but the patient did not cardiovert.  By report, EKGs did not show evidence of significant peak T-waves. He received calcium, dextrose/insulin, lactated Ringer's, IV metoprolol, IV bicarbonate, and Lokelma.  Levophed infusion was initiated.  Blood pressure improved with the Levophed, but tachycardia persisted.  Patient has a PICC line in place.  He also had a Wilcox catheter placed and had a small amount of urine with catheter placement.  Case discussed with the ED doctor and with Critical Care Medicine at [facility].  Patient transfer to [facility] and admitted to the ICU. He was placed on bicarb and amiodarone infusions.         Overview/Hospital Course:  No notes on file    Interval History: f/u  patient still reporting leg pain.     Review of Systems  Objective:     Vital Signs (Most Recent):  Temp: 99.2 °F (37.3 °C) (11/02/24 1538)  Pulse: 73 (11/02/24 1728)  Resp: 18 (11/02/24 1538)  BP: (!) 112/58 (11/02/24 1728)  SpO2: 99 % (11/02/24 1538) Vital Signs (24h Range):  Temp:  [98.5 °F (36.9 °C)-100.1 °F (37.8 °C)] 99.2 °F (37.3 °C)  Pulse:  [58-80] 73  Resp:  [18-19] 18  SpO2:  [95 %-99 %] 99 %  BP: ()/(50-77) 112/58     Weight: 86 kg (189 lb 9.5 oz)  Body mass index is 28 kg/m².    Intake/Output Summary (Last 24 hours) at 11/2/2024 1737  Last data filed at 11/2/2024 1732  Gross per 24 hour   Intake 720 ml   Output 2100 ml   Net -1380 ml         Physical Exam  HENT:      Head: Normocephalic and atraumatic.   Cardiovascular:      Rate and Rhythm: Normal rate and regular rhythm.      Heart sounds: No murmur heard.  Pulmonary:      Effort: Pulmonary effort is normal. No respiratory distress.      Breath sounds: Normal breath sounds. No wheezing.   Abdominal:      General: Bowel sounds are normal. There is no distension.      Palpations: Abdomen is soft.      Tenderness: There is no abdominal tenderness.   Musculoskeletal:         General: No swelling.   Skin:     General: Skin is warm and dry.   Neurological:      Mental Status: He is alert and oriented to person, place, and time. Mental status is at baseline.             Significant Labs: All pertinent labs within the past 24 hours have been reviewed.  Recent Lab Results         11/02/24  0548        Albumin 3.4       ALP 37       ALT 13       Anion Gap 11       AST 25       Baso # 0.02       Basophil % 0.3       BILIRUBIN TOTAL 0.5  Comment: For infants and newborns, interpretation of results should be based  on gestational age, weight and in agreement with clinical  observations.    Premature Infant recommended reference ranges:  Up to 24 hours.............<8.0 mg/dL  Up to 48  hours............<12.0 mg/dL  3-5 days..................<15.0 mg/dL  6-29 days.................<15.0 mg/dL         BUN 41       Calcium 9.3       Chloride 107       CO2 22       Creatinine 2.1       Differential Method Automated       eGFR 34       Eos # 0.2       Eos % 2.5       Glucose 82       Gran # (ANC) 4.9       Gran % 71.1       Hematocrit 33.7       Hemoglobin 10.8       Immature Grans (Abs) 0.04  Comment: Mild elevation in immature granulocytes is non specific and   can be seen in a variety of conditions including stress response,   acute inflammation, trauma and pregnancy. Correlation with other   laboratory and clinical findings is essential.         Immature Granulocytes 0.6       Lymph # 1.1       Lymph % 15.2       Magnesium  1.8       MCH 28.2       MCHC 32.0       MCV 88       Mono # 0.7       Mono % 10.3       MPV 10.5       nRBC 0       Phosphorus Level 3.2       Platelet Count 115       Potassium 5.0       PROTEIN TOTAL 6.7       RBC 3.83       RDW 14.1       Sodium 140       WBC 6.91               Significant Imaging: I have reviewed all pertinent imaging results/findings within the past 24 hours.    Assessment/Plan:      * Atrial fibrillation with RVR  Patient has paroxysmal (<7 days) atrial fibrillation. Patient is currently in sinus rhythm. [id number] Score: 1. The patients heart rate in the last 24 hours is as follows:  Pulse  Min: 58  Max: 80     Antiarrhythmics  amiodarone tablet 200 mg, Daily, Oral  metoprolol tartrate (LOPRESSOR) tablet 25 mg, Every 6 hours, Oral    Anticoagulants  rivaroxaban tablet 15 mg, With dinner, Oral    Plan  - Replete lytes with a goal of K>4, Mg >2  - Patient is anticoagulated, see medications listed above.  - Patient's afib is currently controlled  - cardiology following        HFrEF (heart failure with reduced ejection fraction)  Patient has Systolic (HFrEF) heart failure that is Acute on chronic. On presentation their CHF was decompensated. Evidence of  decompensated CHF on presentation includes: elevated JVD and shortness of breath. The etiology of their decompensation is likely atrial fibrillation . Most recent BNP and echo results are listed below.  Recent Labs     10/29/24  1738   *     Latest ECHO  Results for orders placed during the hospital encounter of 10/29/24    Echo    Interpretation Summary    Left Ventricle: The left ventricle is normal in size. Ventricular mass is normal. Mildly increased wall thickness. There is concentric remodeling. There is normal systolic function with a visually estimated ejection fraction of 60 - 65%. Unable to assess diastolic function due to atrial fibrillation.    Right Ventricle: Severe right ventricular enlargement. Wall thickness is normal. Systolic function is moderately reduced.    Right Atrium: Right atrium is severely dilated.    Tricuspid Valve: There is severe regurgitation.    Pulmonic Valve: There is mild regurgitation.    Pulmonary Artery: There is moderate pulmonary hypertension. The estimated pulmonary artery systolic pressure is 59 mmHg.    IVC/SVC: Elevated venous pressure at 15 mmHg.    Current Heart Failure Medications       Plan  - Monitor strict I&Os and daily weights.    - Place on telemetry  - Low sodium diet  - Cardiology has been consulted  - The patient's volume status is improving but not at their baseline as indicated by elevated JVD            Hyperkalemia  Hyperkalemia is likely due to CARINA.The patients most recent potassium results are listed below.  Recent Labs     10/31/24  0436 11/01/24  0420 11/02/24  0548   K 4.3 4.9 5.0       Plan  - Monitor for arrhythmias with EKG and/or continuous telemetry.   - may need potassium binder              CARINA (acute kidney injury)  CARINA is likely due to  multiple factors, HF, afib, hypotension . Baseline creatinine is  0.7-1 . Most recent creatinine and eGFR are listed below.  Recent Labs     10/31/24  0436 11/01/24  0420 11/02/24  0548   CREATININE  2.5* 2.1* 2.1*   EGFRNORACEVR 27* 34* 34*        Plan  - CARINA is improving  - Avoid nephrotoxins and renally dose meds for GFR listed above  - Monitor urine output, serial BMP, and adjust therapy as needed  - appreciate nephrology      VTE Risk Mitigation (From admission, onward)           Ordered     rivaroxaban tablet 15 mg  With dinner         10/31/24 0912                    Discharge Planning   WIL:      Code Status: Full Code   Is the patient medically ready for discharge?:     Reason for patient still in hospital (select all that apply): Patient trending condition and Treatment  Discharge Plan A: Long-term acute care facility (LTAC)                  Johnna Irizarry MD  Department of Hospital Medicine   '[place] - Telemetry ([facility])

## 2024-11-03 PROBLEM — I73.9 PAD (PERIPHERAL ARTERY DISEASE): Status: ACTIVE | Noted: 2024-11-03

## 2024-11-03 LAB
ALBUMIN SERPL BCP-MCNC: 3 G/DL (ref 3.5–5.2)
ALP SERPL-CCNC: 38 U/L (ref 40–150)
ALT SERPL W/O P-5'-P-CCNC: 11 U/L (ref 10–44)
ANION GAP SERPL CALC-SCNC: 5 MMOL/L (ref 8–16)
AST SERPL-CCNC: 22 U/L (ref 10–40)
BASOPHILS # BLD AUTO: 0.01 K/UL (ref 0–0.2)
BASOPHILS NFR BLD: 0.2 % (ref 0–1.9)
BILIRUB SERPL-MCNC: 0.4 MG/DL (ref 0.1–1)
BUN SERPL-MCNC: 34 MG/DL (ref 8–23)
CALCIUM SERPL-MCNC: 8.8 MG/DL (ref 8.7–10.5)
CHLORIDE SERPL-SCNC: 108 MMOL/L (ref 95–110)
CO2 SERPL-SCNC: 25 MMOL/L (ref 23–29)
CREAT SERPL-MCNC: 1.7 MG/DL (ref 0.5–1.4)
DIFFERENTIAL METHOD BLD: ABNORMAL
EOSINOPHIL # BLD AUTO: 0.3 K/UL (ref 0–0.5)
EOSINOPHIL NFR BLD: 5 % (ref 0–8)
ERYTHROCYTE [DISTWIDTH] IN BLOOD BY AUTOMATED COUNT: 14.1 % (ref 11.5–14.5)
EST. GFR  (NO RACE VARIABLE): 44 ML/MIN/1.73 M^2
GLUCOSE SERPL-MCNC: 79 MG/DL (ref 70–110)
HCT VFR BLD AUTO: 32.8 % (ref 40–54)
HGB BLD-MCNC: 10.3 G/DL (ref 14–18)
IMM GRANULOCYTES # BLD AUTO: 0.02 K/UL (ref 0–0.04)
IMM GRANULOCYTES NFR BLD AUTO: 0.4 % (ref 0–0.5)
LYMPHOCYTES # BLD AUTO: 0.9 K/UL (ref 1–4.8)
LYMPHOCYTES NFR BLD: 16 % (ref 18–48)
MAGNESIUM SERPL-MCNC: 1.8 MG/DL (ref 1.6–2.6)
MCH RBC QN AUTO: 27.8 PG (ref 27–31)
MCHC RBC AUTO-ENTMCNC: 31.4 G/DL (ref 32–36)
MCV RBC AUTO: 89 FL (ref 82–98)
MONOCYTES # BLD AUTO: 0.4 K/UL (ref 0.3–1)
MONOCYTES NFR BLD: 7.4 % (ref 4–15)
NEUTROPHILS # BLD AUTO: 4 K/UL (ref 1.8–7.7)
NEUTROPHILS NFR BLD: 71 % (ref 38–73)
NRBC BLD-RTO: 0 /100 WBC
OHS QRS DURATION: 120 MS
OHS QTC CALCULATION: 415 MS
PHOSPHATE SERPL-MCNC: 3.2 MG/DL (ref 2.7–4.5)
PLATELET # BLD AUTO: 110 K/UL (ref 150–450)
PMV BLD AUTO: 10.6 FL (ref 9.2–12.9)
POTASSIUM SERPL-SCNC: 5.1 MMOL/L (ref 3.5–5.1)
PROT SERPL-MCNC: 6.3 G/DL (ref 6–8.4)
RBC # BLD AUTO: 3.7 M/UL (ref 4.6–6.2)
SODIUM SERPL-SCNC: 138 MMOL/L (ref 136–145)
WBC # BLD AUTO: 5.64 K/UL (ref 3.9–12.7)

## 2024-11-03 PROCEDURE — 99233 SBSQ HOSP IP/OBS HIGH 50: CPT | Mod: ,,, | Performed by: INTERNAL MEDICINE

## 2024-11-03 PROCEDURE — 94640 AIRWAY INHALATION TREATMENT: CPT

## 2024-11-03 PROCEDURE — 84100 ASSAY OF PHOSPHORUS: CPT | Performed by: NURSE PRACTITIONER

## 2024-11-03 PROCEDURE — 25000242 PHARM REV CODE 250 ALT 637 W/ HCPCS: Performed by: INTERNAL MEDICINE

## 2024-11-03 PROCEDURE — 25000003 PHARM REV CODE 250: Performed by: NURSE PRACTITIONER

## 2024-11-03 PROCEDURE — 94761 N-INVAS EAR/PLS OXIMETRY MLT: CPT

## 2024-11-03 PROCEDURE — 83735 ASSAY OF MAGNESIUM: CPT | Performed by: NURSE PRACTITIONER

## 2024-11-03 PROCEDURE — 85025 COMPLETE CBC W/AUTO DIFF WBC: CPT | Performed by: NURSE PRACTITIONER

## 2024-11-03 PROCEDURE — 25000003 PHARM REV CODE 250: Performed by: INTERNAL MEDICINE

## 2024-11-03 PROCEDURE — 25000242 PHARM REV CODE 250 ALT 637 W/ HCPCS: Performed by: NURSE PRACTITIONER

## 2024-11-03 PROCEDURE — 21400001 HC TELEMETRY ROOM

## 2024-11-03 PROCEDURE — 36415 COLL VENOUS BLD VENIPUNCTURE: CPT | Performed by: NURSE PRACTITIONER

## 2024-11-03 PROCEDURE — 25000003 PHARM REV CODE 250: Performed by: SPECIALIST

## 2024-11-03 PROCEDURE — 25000003 PHARM REV CODE 250: Performed by: PHYSICIAN ASSISTANT

## 2024-11-03 PROCEDURE — 80053 COMPREHEN METABOLIC PANEL: CPT | Performed by: NURSE PRACTITIONER

## 2024-11-03 RX ORDER — LOSARTAN POTASSIUM 25 MG/1
25 TABLET ORAL DAILY
Status: DISCONTINUED | OUTPATIENT
Start: 2024-11-03 | End: 2024-11-05

## 2024-11-03 RX ORDER — METOPROLOL SUCCINATE 25 MG/1
25 TABLET, EXTENDED RELEASE ORAL DAILY
Status: DISCONTINUED | OUTPATIENT
Start: 2024-11-03 | End: 2024-11-09

## 2024-11-03 RX ORDER — ATORVASTATIN CALCIUM 40 MG/1
80 TABLET, FILM COATED ORAL NIGHTLY
Status: DISCONTINUED | OUTPATIENT
Start: 2024-11-03 | End: 2024-11-15 | Stop reason: HOSPADM

## 2024-11-03 RX ORDER — ASPIRIN 81 MG/1
81 TABLET ORAL DAILY
Status: DISCONTINUED | OUTPATIENT
Start: 2024-11-03 | End: 2024-11-15 | Stop reason: HOSPADM

## 2024-11-03 RX ADMIN — BUDESONIDE INHALATION 0.5 MG: 0.5 SUSPENSION RESPIRATORY (INHALATION) at 07:11

## 2024-11-03 RX ADMIN — METOPROLOL TARTRATE 25 MG: 25 TABLET, FILM COATED ORAL at 12:11

## 2024-11-03 RX ADMIN — SENNOSIDES AND DOCUSATE SODIUM 1 TABLET: 50; 8.6 TABLET ORAL at 08:11

## 2024-11-03 RX ADMIN — RIVAROXABAN 15 MG: 15 TABLET, FILM COATED ORAL at 05:11

## 2024-11-03 RX ADMIN — MUPIROCIN: 20 OINTMENT TOPICAL at 09:11

## 2024-11-03 RX ADMIN — ARFORMOTEROL TARTRATE 15 MCG: 15 SOLUTION RESPIRATORY (INHALATION) at 07:11

## 2024-11-03 RX ADMIN — ATORVASTATIN CALCIUM 80 MG: 40 TABLET, FILM COATED ORAL at 08:11

## 2024-11-03 RX ADMIN — FAMOTIDINE 20 MG: 20 TABLET ORAL at 09:11

## 2024-11-03 RX ADMIN — METOPROLOL TARTRATE 25 MG: 25 TABLET, FILM COATED ORAL at 05:11

## 2024-11-03 RX ADMIN — ASPIRIN 81 MG: 81 TABLET, COATED ORAL at 04:11

## 2024-11-03 RX ADMIN — ARFORMOTEROL TARTRATE 15 MCG: 15 SOLUTION RESPIRATORY (INHALATION) at 08:11

## 2024-11-03 RX ADMIN — EMPAGLIFLOZIN 10 MG: 10 TABLET, FILM COATED ORAL at 04:11

## 2024-11-03 RX ADMIN — AMIODARONE HYDROCHLORIDE 200 MG: 200 TABLET ORAL at 09:11

## 2024-11-03 RX ADMIN — BUDESONIDE INHALATION 0.5 MG: 0.5 SUSPENSION RESPIRATORY (INHALATION) at 08:11

## 2024-11-03 NOTE — ASSESSMENT & PLAN NOTE
Hyperkalemia is likely due to CARINA.The patients most recent potassium results are listed below.  Recent Labs     11/01/24  0420 11/02/24  0548 11/03/24  0556   K 4.9 5.0 5.1       Plan  - Monitor for arrhythmias with EKG and/or continuous telemetry.   - may need potassium binder

## 2024-11-03 NOTE — PLAN OF CARE
A232/A232 SANTOS Abrams is a 68 y.o.male admitted on 10/29/2024 for Atrial fibrillation with RVR   Code Status: Full Code MRN: [id number]   Review of patient's allergies indicates:  No Known Allergies  Past Medical History:   Diagnosis Date    Arthritis     CHF (congestive heart failure)     COPD (chronic obstructive pulmonary disease)     Hypertension     Liver disease     Stroke       PRN meds    acetaminophen, 650 mg, Q6H PRN  influenza (adjuvanted), 0.5 mL, Prior to discharge  ondansetron, 8 mg, Q6H PRN  pneumoc 20-haylee conj-dip cr(PF), 0.5 mL, vaccine x 1 dose         Pt oriented x4.  VSS.  Pt remained afebrile throughout this shift.   All meds administered per order.   Pt remained free of falls this shift.   Plan of care reviewed. Patient verbalizes understanding.   Pt moving/turning independently.   Bed low, side rails up x 2, wheels locked, call light in reach.   Patient instructed to call for assistance.  Patient education provided      Chart check completed. Will continue plan of care.      Orientation: disoriented to, place, time, situation  Sinclair Coma Scale Score: 14     Lead Monitored: Lead II Rhythm: normal sinus rhythm Frequency/Ectopy: PACs  Cardiac/Telemetry Box Number: [id number]  VTE Core Measure: Pharmacological prophylaxis initiated/maintained Last Bowel Movement: 11/03/24  Diet Renal  Voiding Characteristics: urethral catheter (bladder)  Chris Score: 17  Fall Risk Score: 17  Accucheck []   Freq?      Lines/Drains/Airways       Drain  Duration                  Urethral Catheter 10/30/24 1710 Non-latex;Temperature probe 16 Fr. 4 days              Peripheral Intravenous Line  Duration                  Peripheral IV - Single Lumen 11/01/24 2224 22 G Anterior;Left Forearm 1 day

## 2024-11-03 NOTE — SUBJECTIVE & OBJECTIVE
Interval History: f/u PAD, HF patient feeling better. Skilled placement discussed. Patient reports he plans to go home and his dad with take care of him. Arterial US noted. Vascular surgery consulted.    Review of Systems  Objective:     Vital Signs (Most Recent):  Temp: 99.7 °F (37.6 °C) (11/03/24 1554)  Pulse: (!) 53 (11/03/24 1554)  Resp: 17 (11/03/24 1554)  BP: 109/64 (11/03/24 1554)  SpO2: (!) 94 % (11/03/24 1554) Vital Signs (24h Range):  Temp:  [97.9 °F (36.6 °C)-99.7 °F (37.6 °C)] 99.7 °F (37.6 °C)  Pulse:  [53-91] 53  Resp:  [14-19] 17  SpO2:  [89 %-100 %] 94 %  BP: (102-138)/(58-85) 109/64     Weight: 86 kg (189 lb 9.5 oz)  Body mass index is 28 kg/m².    Intake/Output Summary (Last 24 hours) at 11/3/2024 1651  Last data filed at 11/3/2024 1400  Gross per 24 hour   Intake 720 ml   Output 2100 ml   Net -1380 ml         Physical Exam  HENT:      Head: Normocephalic and atraumatic.   Cardiovascular:      Rate and Rhythm: Normal rate and regular rhythm.      Heart sounds: No murmur heard.  Pulmonary:      Effort: Pulmonary effort is normal. No respiratory distress.      Breath sounds: Normal breath sounds. No wheezing.   Abdominal:      General: Bowel sounds are normal. There is no distension.      Palpations: Abdomen is soft.      Tenderness: There is no abdominal tenderness.   Musculoskeletal:         General: No swelling.   Skin:     General: Skin is warm and dry.   Neurological:      Mental Status: He is alert and oriented to person, place, and time. Mental status is at baseline.             Significant Labs: All pertinent labs within the past 24 hours have been reviewed.  Recent Lab Results         11/03/24  0556        Albumin 3.0       ALP 38       ALT 11       Anion Gap 5       AST 22       Baso # 0.01       Basophil % 0.2       BILIRUBIN TOTAL 0.4  Comment: For infants and newborns, interpretation of results should be based  on gestational age, weight and in agreement with  clinical  observations.    Premature Infant recommended reference ranges:  Up to 24 hours.............<8.0 mg/dL  Up to 48 hours............<12.0 mg/dL  3-5 days..................<15.0 mg/dL  6-29 days.................<15.0 mg/dL         BUN 34       Calcium 8.8       Chloride 108       CO2 25       Creatinine 1.7       Differential Method Automated       eGFR 44       Eos # 0.3       Eos % 5.0       Glucose 79       Gran # (ANC) 4.0       Gran % 71.0       Hematocrit 32.8       Hemoglobin 10.3       Immature Grans (Abs) 0.02  Comment: Mild elevation in immature granulocytes is non specific and   can be seen in a variety of conditions including stress response,   acute inflammation, trauma and pregnancy. Correlation with other   laboratory and clinical findings is essential.         Immature Granulocytes 0.4       Lymph # 0.9       Lymph % 16.0       Magnesium  1.8       MCH 27.8       MCHC 31.4       MCV 89       Mono # 0.4       Mono % 7.4       MPV 10.6       nRBC 0       Phosphorus Level 3.2       Platelet Count 110       Potassium 5.1       PROTEIN TOTAL 6.3       RBC 3.70       RDW 14.1       Sodium 138       WBC 5.64               Significant Imaging: I have reviewed all pertinent imaging results/findings within the past 24 hours.

## 2024-11-03 NOTE — ASSESSMENT & PLAN NOTE
CARINA is likely due to  multiple factors, HF, afib, hypotension . Baseline creatinine is  0.7-1 . Most recent creatinine and eGFR are listed below.  Recent Labs     11/01/24  0420 11/02/24  0548 11/03/24  0556   CREATININE 2.1* 2.1* 1.7*   EGFRNORACEVR 34* 34* 44*        Plan  - CARINA is improving  - Avoid nephrotoxins and renally dose meds for GFR listed above  - Monitor urine output, serial BMP, and adjust therapy as needed  - appreciate nephrology

## 2024-11-03 NOTE — HOSPITAL COURSE
The patient presented with weakness that began on the morning of presentation. By report he had been feeling well the day prior but did not feel well on October 29. In the emergency department he had AFib with RVR and hypotension, but oxygenation was stable on room air. Patient was drowsy but would wake and speak without difficulty. He reported no chest pain or shortness of breath. Labs were concerning for CARINA with  and creatinine 5.38. Potassium was 7. High sensitivity troponin was in the normal range at 40. Chest x-ray had clear lungs but did show marked cardiomegaly. ED attempted cardioversion with the AFib with RVR, but the patient did not cardiovert. By report, EKGs did not show evidence of significant peak T-waves. He received calcium, dextrose/insulin, lactated Ringer's, IV metoprolol, IV bicarbonate, and Lokelma. Levophed infusion was initiated. Blood pressure improved with the Levophed, but tachycardia persisted. Patient has a PICC line in place. He also had a Wilcox catheter placed and had a small amount of urine with catheter placement. Case discussed with the ED doctor and with Critical Care Medicine at [facility]. Patient transfer to [facility] and admitted to the ICU. He was placed on bicarb and amiodarone infusions. He was weaned off of levophed.  His renal function improved. Arterial US revealed PAD and occluded sfa. Vascular surgery was consulted, and evaluation noted plans for outpatient follow up with left leg angiogram for possible intervention and right leg intervention in the near future as well.  Plan to follow up in 2-3 weeks after medical stabilization.  Vitals concerning for bradycardia at rest, but patient continues to remain asymptomatic.  Metoprolol dosage decreased.  Pending SNF placement    11/13- resting comfortably in bed after he participated with PT/OT today. VSS afeb, appears weak and frail. SW arranging for sending him to SNF but Insurance denies. Labs  stable, H/H 12.7/40, Porfirio/Cr 34/1.5- cont present care.     11/14- comfortable in bed, on RA, VSS Afeb, AAox3, speech clear. Better participation in PT/OT. Also getting wound care for the heels. SW working on SNF placement, appealing the denial to the insurance.     11/15- resting comfortably in bed on RA, AAOx4, eating drinking well, doing well with PT/OT. He has been accepted at [facility] and is ready to go. He was seen and examined and deemed stable for discharge to the SNF today.

## 2024-11-03 NOTE — ASSESSMENT & PLAN NOTE
Signifcaitn PAD with ulcer    Advise vascular surgery consult  Add asa 81 mg AND Lipitor 80 mg qhs  Continue xarelto 15 mg daily  Smoking cessation

## 2024-11-03 NOTE — PROGRESS NOTES
[facility] Medicine  Progress Note    Patient Name: Layton Abrams  MRN: [id number]  Patient Class: IP- Inpatient   Admission Date: 10/29/2024  Length of Stay: 5 days  Attending Physician: Johnna Irizarry MD  Primary Care Provider: Nellie Cadena MD        Subjective:     Principal Problem:Atrial fibrillation with RVR        HPI:  The patient is 68-year-old male with a history of atrial fibrillation, coronary artery disease with stenting, hypertension, stroke with right-sided deficits, COPD, HCV, left peroneal DVT, Xarelto use, and lower extremity cellulitis presented to [facility] Emergency Department on October 29 with weakness that began on the morning of presentation.  By report he had been feeling well the day prior but did not feel well on October 29.  In the emergency department he had AFib with RVR and hypotension, but oxygenation was stable on room air.  Patient was drowsy but would wake and speak without difficulty.  He reported no chest pain or shortness of breath.  Labs were concerning for CARINA with  and creatinine 5.38.  Potassium was 7.  High sensitivity troponin was in the normal range at 40.  Chest x-ray had clear lungs but did show marked cardiomegaly.  ED attempted cardioversion with the AFib with RVR, but the patient did not cardiovert.  By report, EKGs did not show evidence of significant peak T-waves. He received calcium, dextrose/insulin, lactated Ringer's, IV metoprolol, IV bicarbonate, and Lokelma.  Levophed infusion was initiated.  Blood pressure improved with the Levophed, but tachycardia persisted.  Patient has a PICC line in place.  He also had a Wilcox catheter placed and had a small amount of urine with catheter placement.  Case discussed with the ED doctor and with Critical Care Medicine at [facility].  Patient transfer to [facility] and admitted to the ICU. He was placed on bicarb and amiodarone infusions.         Overview/Hospital Course:  The patient presented with weakness that began on the morning of presentation. By report he had been feeling well the day prior but did not feel well on October 29. In the emergency department he had AFib with RVR and hypotension, but oxygenation was stable on room air. Patient was drowsy but would wake and speak without difficulty. He reported no chest pain or shortness of breath. Labs were concerning for CARINA with  and creatinine 5.38. Potassium was 7. High sensitivity troponin was in the normal range at 40. Chest x-ray had clear lungs but did show marked cardiomegaly. ED attempted cardioversion with the AFib with RVR, but the patient did not cardiovert. By report, EKGs did not show evidence of significant peak T-waves. He received calcium, dextrose/insulin, lactated Ringer's, IV metoprolol, IV bicarbonate, and Lokelma. Levophed infusion was initiated. Blood pressure improved with the Levophed, but tachycardia persisted. Patient has a PICC line in place. He also had a Wilcox catheter placed and had a small amount of urine with catheter placement. Case discussed with the ED doctor and with Critical Care Medicine at [facility]. Patient transfer to [facility] and admitted to the ICU. He was placed on bicarb and amiodarone infusions. He was weaned off of levophed.  His renal function improved. Arterial US revealed PAD and occluded sfa. Vascular surgery was consulted.     Interval History: f/u PAD, HF patient feeling better. Skilled placement discussed. Patient reports he plans to go home and his dad with take care of him. Arterial US noted. Vascular surgery consulted.    Review of Systems  Objective:     Vital Signs (Most Recent):  Temp: 99.7 °F (37.6 °C) (11/03/24 1554)  Pulse: (!) 53 (11/03/24 1554)  Resp: 17 (11/03/24 1554)  BP: 109/64 (11/03/24 1554)  SpO2: (!) 94 % (11/03/24 1554) Vital Signs (24h Range):  Temp:  [97.9 °F (36.6 °C)-99.7 °F (37.6 °C)] 99.7 °F  (37.6 °C)  Pulse:  [53-91] 53  Resp:  [14-19] 17  SpO2:  [89 %-100 %] 94 %  BP: (102-138)/(58-85) 109/64     Weight: 86 kg (189 lb 9.5 oz)  Body mass index is 28 kg/m².    Intake/Output Summary (Last 24 hours) at 11/3/2024 1651  Last data filed at 11/3/2024 1400  Gross per 24 hour   Intake 720 ml   Output 2100 ml   Net -1380 ml         Physical Exam  HENT:      Head: Normocephalic and atraumatic.   Cardiovascular:      Rate and Rhythm: Normal rate and regular rhythm.      Heart sounds: No murmur heard.  Pulmonary:      Effort: Pulmonary effort is normal. No respiratory distress.      Breath sounds: Normal breath sounds. No wheezing.   Abdominal:      General: Bowel sounds are normal. There is no distension.      Palpations: Abdomen is soft.      Tenderness: There is no abdominal tenderness.   Musculoskeletal:         General: No swelling.   Skin:     General: Skin is warm and dry.   Neurological:      Mental Status: He is alert and oriented to person, place, and time. Mental status is at baseline.             Significant Labs: All pertinent labs within the past 24 hours have been reviewed.  Recent Lab Results         11/03/24  0556        Albumin 3.0       ALP 38       ALT 11       Anion Gap 5       AST 22       Baso # 0.01       Basophil % 0.2       BILIRUBIN TOTAL 0.4  Comment: For infants and newborns, interpretation of results should be based  on gestational age, weight and in agreement with clinical  observations.    Premature Infant recommended reference ranges:  Up to 24 hours.............<8.0 mg/dL  Up to 48 hours............<12.0 mg/dL  3-5 days..................<15.0 mg/dL  6-29 days.................<15.0 mg/dL         BUN 34       Calcium 8.8       Chloride 108       CO2 25       Creatinine 1.7       Differential Method Automated       eGFR 44       Eos # 0.3       Eos % 5.0       Glucose 79       Gran # (ANC) 4.0       Gran % 71.0       Hematocrit 32.8       Hemoglobin 10.3       Immature Grans (Abs)  "0.02  Comment: Mild elevation in immature granulocytes is non specific and   can be seen in a variety of conditions including stress response,   acute inflammation, trauma and pregnancy. Correlation with other   laboratory and clinical findings is essential.         Immature Granulocytes 0.4       Lymph # 0.9       Lymph % 16.0       Magnesium  1.8       MCH 27.8       MCHC 31.4       MCV 89       Mono # 0.4       Mono % 7.4       MPV 10.6       nRBC 0       Phosphorus Level 3.2       Platelet Count 110       Potassium 5.1       PROTEIN TOTAL 6.3       RBC 3.70       RDW 14.1       Sodium 138       WBC 5.64               Significant Imaging: I have reviewed all pertinent imaging results/findings within the past 24 hours.    Assessment/Plan:      * Atrial fibrillation with RVR  Patient has paroxysmal (<7 days) atrial fibrillation. Patient is currently in sinus rhythm. [id number] Score: 1. The patients heart rate in the last 24 hours is as follows:  Pulse  Min: 53  Max: 91     Antiarrhythmics  amiodarone tablet 200 mg, Daily, Oral  metoprolol succinate (TOPROL-XL) 24 hr tablet 25 mg, Daily, Oral    Anticoagulants  rivaroxaban tablet 15 mg, With dinner, Oral    Plan  - Replete lytes with a goal of K>4, Mg >2  - Patient is anticoagulated, see medications listed above.  - Patient's afib is currently controlled  - cardiology following        PAD (peripheral artery disease)  -consult vascular surgery  -continue current medications      HFrEF (heart failure with reduced ejection fraction)  Patient has Systolic (HFrEF) heart failure that is Acute on chronic. On presentation their CHF was decompensated. Evidence of decompensated CHF on presentation includes: elevated JVD and shortness of breath. The etiology of their decompensation is likely atrial fibrillation . Most recent BNP and echo results are listed below.  No results for input(s): "BNP" in the last 72 hours.    Latest ECHO  Results for orders placed during the " hospital encounter of 10/29/24    Echo    Interpretation Summary    Left Ventricle: The left ventricle is normal in size. Ventricular mass is normal. Mildly increased wall thickness. There is concentric remodeling. There is normal systolic function with a visually estimated ejection fraction of 60 - 65%. Unable to assess diastolic function due to atrial fibrillation.    Right Ventricle: Severe right ventricular enlargement. Wall thickness is normal. Systolic function is moderately reduced.    Right Atrium: Right atrium is severely dilated.    Tricuspid Valve: There is severe regurgitation.    Pulmonic Valve: There is mild regurgitation.    Pulmonary Artery: There is moderate pulmonary hypertension. The estimated pulmonary artery systolic pressure is 59 mmHg.    IVC/SVC: Elevated venous pressure at 15 mmHg.    Current Heart Failure Medications  metoprolol succinate (TOPROL-XL) 24 hr tablet 25 mg, Daily, Oral  empagliflozin (Jardiance) tablet 10 mg, Daily, Oral  losartan tablet 25 mg, Daily, Oral    Plan  - Monitor strict I&Os and daily weights.    - Place on telemetry  - Low sodium diet  - Cardiology has been consulted  - The patient's volume status is improving but not at their baseline as indicated by elevated JVD            Hyperkalemia  Hyperkalemia is likely due to CARINA.The patients most recent potassium results are listed below.  Recent Labs     11/01/24  0420 11/02/24  0548 11/03/24  0556   K 4.9 5.0 5.1       Plan  - Monitor for arrhythmias with EKG and/or continuous telemetry.   - may need potassium binder              CARINA (acute kidney injury)  CARINA is likely due to  multiple factors, HF, afib, hypotension . Baseline creatinine is  0.7-1 . Most recent creatinine and eGFR are listed below.  Recent Labs     11/01/24  0420 11/02/24  0548 11/03/24  0556   CREATININE 2.1* 2.1* 1.7*   EGFRNORACEVR 34* 34* 44*        Plan  - CARINA is improving  - Avoid nephrotoxins and renally dose meds for GFR listed above  - Monitor  urine output, serial BMP, and adjust therapy as needed  - appreciate nephrology      VTE Risk Mitigation (From admission, onward)           Ordered     rivaroxaban tablet 15 mg  With dinner         10/31/24 0912                    Discharge Planning   WIL:      Code Status: Full Code   Is the patient medically ready for discharge?:     Reason for patient still in hospital (select all that apply): Patient trending condition, Treatment, and Consult recommendations  Discharge Plan A: Long-term acute care facility (LTAC)                  Johnna Irizarry MD  Department of Hospital Medicine   [facility] ([facility])

## 2024-11-03 NOTE — SUBJECTIVE & OBJECTIVE
"\    ROS  Objective:     Vital Signs (Most Recent):  Temp: 98.7 °F (37.1 °C) (11/03/24 1134)  Pulse: 90 (11/03/24 1134)  Resp: 18 (11/03/24 1134)  BP: 138/85 (11/03/24 1219)  SpO2: 100 % (11/03/24 1134) Vital Signs (24h Range):  Temp:  [97.9 °F (36.6 °C)-99.2 °F (37.3 °C)] 98.7 °F (37.1 °C)  Pulse:  [54-91] 90  Resp:  [14-19] 18  SpO2:  [89 %-100 %] 100 %  BP: (102-138)/(58-85) 138/85     Weight: 86 kg (189 lb 9.5 oz)  Body mass index is 28 kg/m².     SpO2: 100 %         Intake/Output Summary (Last 24 hours) at 11/3/2024 1400  Last data filed at 11/3/2024 0915  Gross per 24 hour   Intake 480 ml   Output 2100 ml   Net -1620 ml       Lines/Drains/Airways       Drain  Duration                  Urethral Catheter 10/30/24 1710 Non-latex;Temperature probe 16 Fr. 3 days              Peripheral Intravenous Line  Duration                  Peripheral IV - Single Lumen 11/01/24 2224 22 G Anterior;Left Forearm 1 day                       Physical Exam  Vitals and nursing note reviewed.   Constitutional:       Appearance: He is ill-appearing.   HENT:      Head: Normocephalic and atraumatic.   Eyes:      Pupils: Pupils are equal, round, and reactive to light.   Cardiovascular:      Rate and Rhythm: Normal rate and regular rhythm. Frequent Extrasystoles are present.     Heart sounds: S1 normal and S2 normal. No murmur heard.  Pulmonary:      Comments: Diminished  Abdominal:      Palpations: Abdomen is soft.   Neurological:      Mental Status: He is oriented to person, place, and time.      Comments: Residual deficits from prior CVA   Psychiatric:         Mood and Affect: Mood normal.            Significant Labs: ABG: No results for input(s): "PH", "PCO2", "HCO3", "POCSATURATED", "BE" in the last 48 hours., Blood Culture: No results for input(s): "LABBLOO" in the last 48 hours., BMP:   Recent Labs   Lab 11/02/24  0548 11/03/24  0556   GLU 82 79    138   K 5.0 5.1    108   CO2 22* 25   BUN 41* 34*   CREATININE 2.1* 1.7* " "  CALCIUM 9.3 8.8   MG 1.8 1.8   , CMP   Recent Labs   Lab 11/02/24  0548 11/03/24  0556    138   K 5.0 5.1    108   CO2 22* 25   GLU 82 79   BUN 41* 34*   CREATININE 2.1* 1.7*   CALCIUM 9.3 8.8   PROT 6.7 6.3   ALBUMIN 3.4* 3.0*   BILITOT 0.5 0.4   ALKPHOS 37* 38*   AST 25 22   ALT 13 11   ANIONGAP 11 5*   , CBC   Recent Labs   Lab 11/02/24  0548 11/03/24  0556   WBC 6.91 5.64   HGB 10.8* 10.3*   HCT 33.7* 32.8*   * 110*   , INR No results for input(s): "INR", "PROTIME" in the last 48 hours., Lipid Panel No results for input(s): "CHOL", "HDL", "LDLCALC", "TRIG", "CHOLHDL" in the last 48 hours., and Troponin No results for input(s): "TROPONINI" in the last 48 hours.    Significant Imaging: EKG:    "

## 2024-11-03 NOTE — PROGRESS NOTES
"[facility] ([facility])  Cardiology  Progress Note    Patient Name: Layton Abrams  MRN: [id number]  Admission Date: 10/29/2024  Hospital Length of Stay: 5 days  Code Status: Full Code   Attending Physician: Johnna Irizarry MD   Primary Care Physician: Nellie Cadena MD  Expected Discharge Date:   Principal Problem:Atrial fibrillation with RVR    Subjective:     Hospital Course:   Cardiology consult for afib with RVR.  PMH chronic afib, HTN; CAD; HFrEF (4/2024 30-40%EF with ungraded diastolic dysfunction and "giant" rt heart; COPD; h/o DVT earlier this year on xarelto; HCV; prior CVA x 5. Prior cardiologist Dr. Cowan.  Admitted for ARF low BP and afib with RVR. Baseline Cr 0.8 and was 5 and improved to 4.7 after IVF  Multiple ulcers and abscess identified by wound care team  Now AFIB VR controlled, BP at 90/60 with nuke gtt   No active bleeding. On heparin gtt.   EKG AFIB RBBB    10/31/24-Patient seen and examined today, lying in bed. Appears weak/deconditioned. States he feels well. No CV complaints. Noted to be tachycardic this AM, given dose of IV digoxin with improvement. Off pressor support. Labs reviewed. Creatinine down to 2.5. Mg 1.3, being repleted. TTE with normal EF, moderately reduced RV function, pulmonary HTN. BB initiated for HR control.    11/1/24-Patient seen and examined today. Awake, alert. States he feels well. Complains of BLE pain. Appears to have converted to SR, EKG pending. Labs reviewed. Creatinine down to 2.1.     11/02/2024  On SR and frequent PACs, improved SOB. No abd pain Cr 2.1 stable I&O -3.5 liters since admission    11/03/2024  On SR, sob improved, Cr 1.7 improved, LE arterial US showed significant PAD, right midSFA occluded      \    ROS  Objective:     Vital Signs (Most Recent):  Temp: 98.7 °F (37.1 °C) (11/03/24 1134)  Pulse: 90 (11/03/24 1134)  Resp: 18 (11/03/24 1134)  BP: 138/85 (11/03/24 1219)  SpO2: 100 % (11/03/24 1134) Vital Signs (24h Range):  Temp:  [97.9 " "°F (36.6 °C)-99.2 °F (37.3 °C)] 98.7 °F (37.1 °C)  Pulse:  [54-91] 90  Resp:  [14-19] 18  SpO2:  [89 %-100 %] 100 %  BP: (102-138)/(58-85) 138/85     Weight: 86 kg (189 lb 9.5 oz)  Body mass index is 28 kg/m².     SpO2: 100 %         Intake/Output Summary (Last 24 hours) at 11/3/2024 1400  Last data filed at 11/3/2024 0915  Gross per 24 hour   Intake 480 ml   Output 2100 ml   Net -1620 ml       Lines/Drains/Airways       Drain  Duration                  Urethral Catheter 10/30/24 1710 Non-latex;Temperature probe 16 Fr. 3 days              Peripheral Intravenous Line  Duration                  Peripheral IV - Single Lumen 11/01/24 2224 22 G Anterior;Left Forearm 1 day                       Physical Exam  Vitals and nursing note reviewed.   Constitutional:       Appearance: He is ill-appearing.   HENT:      Head: Normocephalic and atraumatic.   Eyes:      Pupils: Pupils are equal, round, and reactive to light.   Cardiovascular:      Rate and Rhythm: Normal rate and regular rhythm. Frequent Extrasystoles are present.     Heart sounds: S1 normal and S2 normal. No murmur heard.  Pulmonary:      Comments: Diminished  Abdominal:      Palpations: Abdomen is soft.   Neurological:      Mental Status: He is oriented to person, place, and time.      Comments: Residual deficits from prior CVA   Psychiatric:         Mood and Affect: Mood normal.            Significant Labs: ABG: No results for input(s): "PH", "PCO2", "HCO3", "POCSATURATED", "BE" in the last 48 hours., Blood Culture: No results for input(s): "LABBLOO" in the last 48 hours., BMP:   Recent Labs   Lab 11/02/24  0548 11/03/24  0556   GLU 82 79    138   K 5.0 5.1    108   CO2 22* 25   BUN 41* 34*   CREATININE 2.1* 1.7*   CALCIUM 9.3 8.8   MG 1.8 1.8   , CMP   Recent Labs   Lab 11/02/24  0548 11/03/24  0556    138   K 5.0 5.1    108   CO2 22* 25   GLU 82 79   BUN 41* 34*   CREATININE 2.1* 1.7*   CALCIUM 9.3 8.8   PROT 6.7 6.3   ALBUMIN 3.4* 3.0* " "  BILITOT 0.5 0.4   ALKPHOS 37* 38*   AST 25 22   ALT 13 11   ANIONGAP 11 5*   , CBC   Recent Labs   Lab 11/02/24  0548 11/03/24  0556   WBC 6.91 5.64   HGB 10.8* 10.3*   HCT 33.7* 32.8*   * 110*   , INR No results for input(s): "INR", "PROTIME" in the last 48 hours., Lipid Panel No results for input(s): "CHOL", "HDL", "LDLCALC", "TRIG", "CHOLHDL" in the last 48 hours., and Troponin No results for input(s): "TROPONINI" in the last 48 hours.    Significant Imaging: EKG:    Assessment and Plan:         * Atrial fibrillation with RVR  AFIB now V controlled    Continue amio gtt for rate control  Continue heparin gtt  Ok for gentle hydratiob and maryjo gtt for ARF and ? Septal shock     10/31/24  -Tachycardic this AM, given dose of IV digoxin  -EKG pending  -Amiodarone switched to po   -Off pressor support, Lopressor 25 mg q 6 hours initiated  -On Xarelto for CVA prophylaxis     11/1/24  -Appears to have converted to SR, EKG pending  -Continue po amio, BB  -Continue Xarelto    11/02/2024  On SR  Continue amiodarone BB and OAC     PAD (peripheral artery disease)  Signifcaitn PAD with ulcer    Advise vascular surgery consult  Add asa 81 mg AND Lipitor 80 mg qhs  Continue xarelto 15 mg daily  Smoking cessation      HFrEF (heart failure with reduced ejection fraction)  Advise to check CVP     Ok to hold ToprolXl and aldactone due to low BP     10/31/24  -Monitor CVP  -TTE with normal EF, moderately reduced RV systolic function, pulm HTN  -BB resumed    11/1/24  -See above    11/02/2024  Euvolemic   DASH    11/03/2024  D/c lopressor  Add ToprolXL 25 mg losartan 25 mg and jardaince 10 mng daily for CHFimpEF        Hypotension  Continue IVF    10/31/24  -Improved, off pressor support    Hyperkalemia  Now K normalized    CARINA (acute kidney injury)  On IVF    10/31/24  -Improved to 2.5, nephrology following    11/1/24  -Creatinine down to 2.1    Will f/u as needed    VTE Risk Mitigation (From admission, onward)           " Ordered     rivaroxaban tablet 15 mg  With dinner         10/31/24 0912                    Yosef Ocasio MD  Cardiology  [facility] ([facility])

## 2024-11-03 NOTE — ASSESSMENT & PLAN NOTE
Advise to check CVP     Ok to hold ToprolXl and aldactone due to low BP     10/31/24  -Monitor CVP  -TTE with normal EF, moderately reduced RV systolic function, pulm HTN  -BB resumed    11/1/24  -See above    11/02/2024  Euvolemic   DASH    11/03/2024  D/c lopressor  Add ToprolXL 25 mg losartan 25 mg and jardaince 10 mng daily for CHFimpEF

## 2024-11-03 NOTE — ASSESSMENT & PLAN NOTE
Patient has paroxysmal (<7 days) atrial fibrillation. Patient is currently in sinus rhythm. [id number] Score: 1. The patients heart rate in the last 24 hours is as follows:  Pulse  Min: 53  Max: 91     Antiarrhythmics  amiodarone tablet 200 mg, Daily, Oral  metoprolol succinate (TOPROL-XL) 24 hr tablet 25 mg, Daily, Oral    Anticoagulants  rivaroxaban tablet 15 mg, With dinner, Oral    Plan  - Replete lytes with a goal of K>4, Mg >2  - Patient is anticoagulated, see medications listed above.  - Patient's afib is currently controlled  - cardiology following

## 2024-11-03 NOTE — ASSESSMENT & PLAN NOTE
"Patient has Systolic (HFrEF) heart failure that is Acute on chronic. On presentation their CHF was decompensated. Evidence of decompensated CHF on presentation includes: elevated JVD and shortness of breath. The etiology of their decompensation is likely atrial fibrillation . Most recent BNP and echo results are listed below.  No results for input(s): "BNP" in the last 72 hours.    Latest ECHO  Results for orders placed during the hospital encounter of 10/29/24    Echo    Interpretation Summary    Left Ventricle: The left ventricle is normal in size. Ventricular mass is normal. Mildly increased wall thickness. There is concentric remodeling. There is normal systolic function with a visually estimated ejection fraction of 60 - 65%. Unable to assess diastolic function due to atrial fibrillation.    Right Ventricle: Severe right ventricular enlargement. Wall thickness is normal. Systolic function is moderately reduced.    Right Atrium: Right atrium is severely dilated.    Tricuspid Valve: There is severe regurgitation.    Pulmonic Valve: There is mild regurgitation.    Pulmonary Artery: There is moderate pulmonary hypertension. The estimated pulmonary artery systolic pressure is 59 mmHg.    IVC/SVC: Elevated venous pressure at 15 mmHg.    Current Heart Failure Medications  metoprolol succinate (TOPROL-XL) 24 hr tablet 25 mg, Daily, Oral  empagliflozin (Jardiance) tablet 10 mg, Daily, Oral  losartan tablet 25 mg, Daily, Oral    Plan  - Monitor strict I&Os and daily weights.    - Place on telemetry  - Low sodium diet  - Cardiology has been consulted  - The patient's volume status is improving but not at their baseline as indicated by elevated JVD          "

## 2024-11-04 PROBLEM — I95.9 HYPOTENSION: Status: RESOLVED | Noted: 2024-10-29 | Resolved: 2024-11-04

## 2024-11-04 LAB
ALBUMIN SERPL BCP-MCNC: 3.1 G/DL (ref 3.5–5.2)
ALP SERPL-CCNC: 41 U/L (ref 40–150)
ALT SERPL W/O P-5'-P-CCNC: 12 U/L (ref 10–44)
ANION GAP SERPL CALC-SCNC: 7 MMOL/L (ref 8–16)
AST SERPL-CCNC: 22 U/L (ref 10–40)
BACTERIA BLD CULT: NORMAL
BASOPHILS # BLD AUTO: 0.03 K/UL (ref 0–0.2)
BASOPHILS NFR BLD: 0.4 % (ref 0–1.9)
BILIRUB SERPL-MCNC: 0.5 MG/DL (ref 0.1–1)
BUN SERPL-MCNC: 31 MG/DL (ref 8–23)
CALCIUM SERPL-MCNC: 8.9 MG/DL (ref 8.7–10.5)
CHLORIDE SERPL-SCNC: 109 MMOL/L (ref 95–110)
CO2 SERPL-SCNC: 20 MMOL/L (ref 23–29)
CREAT SERPL-MCNC: 1.6 MG/DL (ref 0.5–1.4)
DIFFERENTIAL METHOD BLD: ABNORMAL
EOSINOPHIL # BLD AUTO: 0.4 K/UL (ref 0–0.5)
EOSINOPHIL NFR BLD: 4.6 % (ref 0–8)
ERYTHROCYTE [DISTWIDTH] IN BLOOD BY AUTOMATED COUNT: 14.1 % (ref 11.5–14.5)
EST. GFR  (NO RACE VARIABLE): 47 ML/MIN/1.73 M^2
GLUCOSE SERPL-MCNC: 81 MG/DL (ref 70–110)
HCT VFR BLD AUTO: 34.4 % (ref 40–54)
HGB BLD-MCNC: 10.7 G/DL (ref 14–18)
IMM GRANULOCYTES # BLD AUTO: 0.02 K/UL (ref 0–0.04)
IMM GRANULOCYTES NFR BLD AUTO: 0.3 % (ref 0–0.5)
LYMPHOCYTES # BLD AUTO: 1 K/UL (ref 1–4.8)
LYMPHOCYTES NFR BLD: 12.7 % (ref 18–48)
MAGNESIUM SERPL-MCNC: 1.8 MG/DL (ref 1.6–2.6)
MCH RBC QN AUTO: 28.2 PG (ref 27–31)
MCHC RBC AUTO-ENTMCNC: 31.1 G/DL (ref 32–36)
MCV RBC AUTO: 91 FL (ref 82–98)
MONOCYTES # BLD AUTO: 0.6 K/UL (ref 0.3–1)
MONOCYTES NFR BLD: 7.7 % (ref 4–15)
NEUTROPHILS # BLD AUTO: 5.7 K/UL (ref 1.8–7.7)
NEUTROPHILS NFR BLD: 74.3 % (ref 38–73)
NRBC BLD-RTO: 0 /100 WBC
PHOSPHATE SERPL-MCNC: 3.2 MG/DL (ref 2.7–4.5)
PLATELET # BLD AUTO: 118 K/UL (ref 150–450)
PMV BLD AUTO: 10.8 FL (ref 9.2–12.9)
POTASSIUM SERPL-SCNC: 5.1 MMOL/L (ref 3.5–5.1)
PROT SERPL-MCNC: 6.5 G/DL (ref 6–8.4)
RBC # BLD AUTO: 3.79 M/UL (ref 4.6–6.2)
SODIUM SERPL-SCNC: 136 MMOL/L (ref 136–145)
WBC # BLD AUTO: 7.69 K/UL (ref 3.9–12.7)

## 2024-11-04 PROCEDURE — 63600175 PHARM REV CODE 636 W HCPCS: Performed by: STUDENT IN AN ORGANIZED HEALTH CARE EDUCATION/TRAINING PROGRAM

## 2024-11-04 PROCEDURE — 25000003 PHARM REV CODE 250: Performed by: SPECIALIST

## 2024-11-04 PROCEDURE — 25000242 PHARM REV CODE 250 ALT 637 W/ HCPCS: Performed by: INTERNAL MEDICINE

## 2024-11-04 PROCEDURE — 94640 AIRWAY INHALATION TREATMENT: CPT

## 2024-11-04 PROCEDURE — 94761 N-INVAS EAR/PLS OXIMETRY MLT: CPT

## 2024-11-04 PROCEDURE — 99233 SBSQ HOSP IP/OBS HIGH 50: CPT | Mod: ,,, | Performed by: INTERNAL MEDICINE

## 2024-11-04 PROCEDURE — 21400001 HC TELEMETRY ROOM

## 2024-11-04 PROCEDURE — 80053 COMPREHEN METABOLIC PANEL: CPT | Performed by: NURSE PRACTITIONER

## 2024-11-04 PROCEDURE — 25000003 PHARM REV CODE 250: Performed by: INTERNAL MEDICINE

## 2024-11-04 PROCEDURE — 84100 ASSAY OF PHOSPHORUS: CPT | Performed by: NURSE PRACTITIONER

## 2024-11-04 PROCEDURE — 25000242 PHARM REV CODE 250 ALT 637 W/ HCPCS: Performed by: NURSE PRACTITIONER

## 2024-11-04 PROCEDURE — 85025 COMPLETE CBC W/AUTO DIFF WBC: CPT | Performed by: NURSE PRACTITIONER

## 2024-11-04 PROCEDURE — 36415 COLL VENOUS BLD VENIPUNCTURE: CPT | Performed by: NURSE PRACTITIONER

## 2024-11-04 PROCEDURE — 83735 ASSAY OF MAGNESIUM: CPT | Performed by: NURSE PRACTITIONER

## 2024-11-04 PROCEDURE — 97530 THERAPEUTIC ACTIVITIES: CPT

## 2024-11-04 PROCEDURE — 25000003 PHARM REV CODE 250: Performed by: NURSE PRACTITIONER

## 2024-11-04 RX ORDER — MAGNESIUM SULFATE HEPTAHYDRATE 40 MG/ML
2 INJECTION, SOLUTION INTRAVENOUS ONCE
Status: COMPLETED | OUTPATIENT
Start: 2024-11-04 | End: 2024-11-04

## 2024-11-04 RX ADMIN — LOSARTAN POTASSIUM 25 MG: 25 TABLET, FILM COATED ORAL at 08:11

## 2024-11-04 RX ADMIN — SENNOSIDES AND DOCUSATE SODIUM 1 TABLET: 50; 8.6 TABLET ORAL at 08:11

## 2024-11-04 RX ADMIN — METOPROLOL SUCCINATE 25 MG: 25 TABLET, EXTENDED RELEASE ORAL at 08:11

## 2024-11-04 RX ADMIN — ARFORMOTEROL TARTRATE 15 MCG: 15 SOLUTION RESPIRATORY (INHALATION) at 08:11

## 2024-11-04 RX ADMIN — RIVAROXABAN 15 MG: 15 TABLET, FILM COATED ORAL at 05:11

## 2024-11-04 RX ADMIN — MAGNESIUM SULFATE HEPTAHYDRATE 2 G: 40 INJECTION, SOLUTION INTRAVENOUS at 09:11

## 2024-11-04 RX ADMIN — ASPIRIN 81 MG: 81 TABLET, COATED ORAL at 08:11

## 2024-11-04 RX ADMIN — BUDESONIDE INHALATION 0.5 MG: 0.5 SUSPENSION RESPIRATORY (INHALATION) at 08:11

## 2024-11-04 RX ADMIN — EMPAGLIFLOZIN 10 MG: 10 TABLET, FILM COATED ORAL at 08:11

## 2024-11-04 RX ADMIN — ATORVASTATIN CALCIUM 80 MG: 40 TABLET, FILM COATED ORAL at 08:11

## 2024-11-04 RX ADMIN — FAMOTIDINE 20 MG: 20 TABLET ORAL at 08:11

## 2024-11-04 RX ADMIN — AMIODARONE HYDROCHLORIDE 200 MG: 200 TABLET ORAL at 08:11

## 2024-11-04 NOTE — PROGRESS NOTES
[facility] - Telemetry ([facility])  Nephrology  Progress Note    Patient Name: Layton Abrams  MRN: [id number]  Admission Date: 10/29/2024  Hospital Length of Stay: 6 days  Attending Provider: David Gunderson DO   Primary Care Physician: Nellie Cadena MD  Principal Problem:Atrial fibrillation with RVR    Subjective:     HPI: Noted    Interval History: Pt was seen and examined. Labs and meds reviewed. Discussed with other providers. No new c/o's, no new events.    Review of patient's allergies indicates:  No Known Allergies  Current Facility-Administered Medications   Medication Frequency    acetaminophen tablet 650 mg Q6H PRN    amiodarone tablet 200 mg Daily    arformoteroL nebulizer solution 15 mcg BID    aspirin EC tablet 81 mg Daily    atorvastatin tablet 80 mg QHS    budesonide nebulizer solution 0.5 mg Q12H    empagliflozin (Jardiance) tablet 10 mg Daily    famotidine tablet 20 mg Daily    influenza (adjuvanted) (Fluad) 45 mcg/0.5 mL IM vaccine (> or = 64 yo) 0.5 mL Prior to discharge    losartan tablet 25 mg Daily    metoprolol succinate (TOPROL-XL) 24 hr tablet 25 mg Daily    ondansetron injection 8 mg Q6H PRN    pneumoc 20-haylee conj-dip cr(PF) (PREVNAR-20 (PF)) injection Syrg 0.5 mL vaccine x 1 dose    rivaroxaban tablet 15 mg Daily with dinner    senna-docusate 8.6-50 mg per tablet 1 tablet BID       Objective:     Vital Signs (Most Recent):  Temp: 99 °F (37.2 °C) (11/04/24 1534)  Pulse: 69 (11/04/24 1534)  Resp: 16 (11/04/24 1534)  BP: 110/61 (11/04/24 1534)  SpO2: 97 % (11/04/24 1534) Vital Signs (24h Range):  Temp:  [97.9 °F (36.6 °C)-99.7 °F (37.6 °C)] 99 °F (37.2 °C)  Pulse:  [50-92] 69  Resp:  [16-19] 16  SpO2:  [94 %-98 %] 97 %  BP: (109-133)/(61-91) 110/61     Weight: 86 kg (189 lb 9.5 oz) (10/31/24 0600)  Body mass index is 28 kg/m².  Body surface area is 2.05 meters squared.    I/O last 3 completed shifts:  In: 480 [P.O.:480]  Out: 2100 [Urine:2100]     Physical Exam  Vitals and nursing note  reviewed.   Constitutional:       Appearance: Normal appearance.   Cardiovascular:      Rate and Rhythm: Normal rate and regular rhythm.      Pulses: Normal pulses.      Heart sounds: Normal heart sounds.   Pulmonary:      Effort: Pulmonary effort is normal.      Breath sounds: Normal breath sounds.   Abdominal:      Palpations: Abdomen is soft.      Tenderness: There is no abdominal tenderness. There is no guarding.   Musculoskeletal:      Right lower leg: No edema.      Left lower leg: No edema.   Skin:     General: Skin is warm and dry.   Neurological:      Mental Status: He is alert and oriented to person, place, and time.   Psychiatric:         Behavior: Behavior normal.          Significant Labs: reviewed  BMP  Lab Results   Component Value Date     11/04/2024    K 5.1 11/04/2024     11/04/2024    CO2 20 (L) 11/04/2024    BUN 31 (H) 11/04/2024    CREATININE 1.6 (H) 11/04/2024    CALCIUM 8.9 11/04/2024    ANIONGAP 7 (L) 11/04/2024    EGFRNORACEVR 47 (A) 11/04/2024     Lab Results   Component Value Date    WBC 7.69 11/04/2024    HGB 10.7 (L) 11/04/2024    HCT 34.4 (L) 11/04/2024    MCV 91 11/04/2024     (L) 11/04/2024         Significant Imaging: reviewed      Assessment/Plan:     69 y/o male with CARINA after presenting with AF c RBR:     CARINA (acute kidney injury)  S Cr slowly Improving   Baseline Cr is normal  CARINA is resolving, likely due to prerenal azotemia, secondary to AF, decreased renal perfusion.  K is normal  Na normal  Ca normal  Acid base, mild metabolic acidosis     HTN: BP is low, but stable  Pt is not on any major BP meds     Atrial fibrillation with RVR  Cardiac issues reviewed.  Presented with AF c RVR  Diastolic CHF, CAD     Management of AF reviewed  S/p amiodarone drip, followed by PO  Heparin drip  On beta blockers  On xarelto     Plans and recommendations:  As discussed above  Total time spent 40 minutes including time needed to review the records, the   patient evaluation,  documentation, face-to-face discussion with the patient,   more than 50% of the time was spent on coordination of care and counseling.    Level V visit.        Thank you for your consult.     Lashawn Hamilton MD  Nephrology  [facility] [facility] ([facility])

## 2024-11-04 NOTE — SUBJECTIVE & OBJECTIVE
Interval History: Pt was seen and examined. Labs and meds reviewed. Discussed with other providers. No new c/o's, no new events.    Review of patient's allergies indicates:  No Known Allergies  Current Facility-Administered Medications   Medication Frequency    acetaminophen tablet 650 mg Q6H PRN    amiodarone tablet 200 mg Daily    arformoteroL nebulizer solution 15 mcg BID    aspirin EC tablet 81 mg Daily    atorvastatin tablet 80 mg QHS    budesonide nebulizer solution 0.5 mg Q12H    empagliflozin (Jardiance) tablet 10 mg Daily    famotidine tablet 20 mg Daily    influenza (adjuvanted) (Fluad) 45 mcg/0.5 mL IM vaccine (> or = 66 yo) 0.5 mL Prior to discharge    losartan tablet 25 mg Daily    metoprolol succinate (TOPROL-XL) 24 hr tablet 25 mg Daily    ondansetron injection 8 mg Q6H PRN    pneumoc 20-haylee conj-dip cr(PF) (PREVNAR-20 (PF)) injection Syrg 0.5 mL vaccine x 1 dose    rivaroxaban tablet 15 mg Daily with dinner    senna-docusate 8.6-50 mg per tablet 1 tablet BID       Objective:     Vital Signs (Most Recent):  Temp: 99 °F (37.2 °C) (11/04/24 1534)  Pulse: 69 (11/04/24 1534)  Resp: 16 (11/04/24 1534)  BP: 110/61 (11/04/24 1534)  SpO2: 97 % (11/04/24 1534) Vital Signs (24h Range):  Temp:  [97.9 °F (36.6 °C)-99.7 °F (37.6 °C)] 99 °F (37.2 °C)  Pulse:  [50-92] 69  Resp:  [16-19] 16  SpO2:  [94 %-98 %] 97 %  BP: (109-133)/(61-91) 110/61     Weight: 86 kg (189 lb 9.5 oz) (10/31/24 0600)  Body mass index is 28 kg/m².  Body surface area is 2.05 meters squared.    I/O last 3 completed shifts:  In: 480 [P.O.:480]  Out: 2100 [Urine:2100]     Physical Exam  Vitals and nursing note reviewed.   Constitutional:       Appearance: Normal appearance.   Cardiovascular:      Rate and Rhythm: Normal rate and regular rhythm.      Pulses: Normal pulses.      Heart sounds: Normal heart sounds.   Pulmonary:      Effort: Pulmonary effort is normal.      Breath sounds: Normal breath sounds.   Abdominal:      Palpations: Abdomen is  soft.      Tenderness: There is no abdominal tenderness. There is no guarding.   Musculoskeletal:      Right lower leg: No edema.      Left lower leg: No edema.   Skin:     General: Skin is warm and dry.   Neurological:      Mental Status: He is alert and oriented to person, place, and time.   Psychiatric:         Behavior: Behavior normal.          Significant Labs: reviewed  BMP  Lab Results   Component Value Date     11/04/2024    K 5.1 11/04/2024     11/04/2024    CO2 20 (L) 11/04/2024    BUN 31 (H) 11/04/2024    CREATININE 1.6 (H) 11/04/2024    CALCIUM 8.9 11/04/2024    ANIONGAP 7 (L) 11/04/2024    EGFRNORACEVR 47 (A) 11/04/2024     Lab Results   Component Value Date    WBC 7.69 11/04/2024    HGB 10.7 (L) 11/04/2024    HCT 34.4 (L) 11/04/2024    MCV 91 11/04/2024     (L) 11/04/2024         Significant Imaging: reviewed

## 2024-11-04 NOTE — PROGRESS NOTES
[facility] - Telemetry ([facility])  Nephrology  Progress Note    Patient Name: Layton Abrams  MRN: [id number]  Admission Date: 10/29/2024  Hospital Length of Stay: 5 days  Attending Provider: Johnna Irizarry MD   Primary Care Physician: Nellie Cadena MD  Principal Problem:Atrial fibrillation with RVR    Subjective:     HPI: reviewed    Interval History: Pt was seen and examined. Labs and meds reviewed. Discussed with other providers. No new issues, no c/o's.    Review of patient's allergies indicates:  No Known Allergies  Current Facility-Administered Medications   Medication Frequency    acetaminophen tablet 650 mg Q6H PRN    amiodarone tablet 200 mg Daily    arformoteroL nebulizer solution 15 mcg BID    aspirin EC tablet 81 mg Daily    atorvastatin tablet 80 mg QHS    budesonide nebulizer solution 0.5 mg Q12H    empagliflozin (Jardiance) tablet 10 mg Daily    famotidine tablet 20 mg Daily    influenza (adjuvanted) (Fluad) 45 mcg/0.5 mL IM vaccine (> or = 66 yo) 0.5 mL Prior to discharge    losartan tablet 25 mg Daily    metoprolol succinate (TOPROL-XL) 24 hr tablet 25 mg Daily    ondansetron injection 8 mg Q6H PRN    pneumoc 20-haylee conj-dip cr(PF) (PREVNAR-20 (PF)) injection Syrg 0.5 mL vaccine x 1 dose    rivaroxaban tablet 15 mg Daily with dinner    senna-docusate 8.6-50 mg per tablet 1 tablet BID       Objective:     Vital Signs (Most Recent):  Temp: 99.7 °F (37.6 °C) (11/03/24 1554)  Pulse: 92 (11/03/24 1712)  Resp: 17 (11/03/24 1554)  BP: 109/64 (11/03/24 1554)  SpO2: (!) 94 % (11/03/24 1554) Vital Signs (24h Range):  Temp:  [97.9 °F (36.6 °C)-99.7 °F (37.6 °C)] 99.7 °F (37.6 °C)  Pulse:  [53-92] 92  Resp:  [14-19] 17  SpO2:  [89 %-100 %] 94 %  BP: (102-138)/(61-85) 109/64     Weight: 86 kg (189 lb 9.5 oz) (10/31/24 0600)  Body mass index is 28 kg/m².  Body surface area is 2.05 meters squared.    I/O last 3 completed shifts:  In: 720 [P.O.:720]  Out: 3100 [Urine:3100]     Physical Exam  Constitutional:        Appearance: Normal appearance.   Cardiovascular:      Rate and Rhythm: Normal rate and regular rhythm.      Pulses: Normal pulses.      Heart sounds: Normal heart sounds.   Pulmonary:      Effort: Pulmonary effort is normal.      Breath sounds: Normal breath sounds.   Abdominal:      Palpations: Abdomen is soft.      Tenderness: There is no guarding.   Musculoskeletal:      Right lower leg: No edema.      Left lower leg: No edema.   Neurological:      Mental Status: He is alert.   Psychiatric:         Behavior: Behavior normal.          Significant Labs: reviewed  BMP  Lab Results   Component Value Date     11/03/2024    K 5.1 11/03/2024     11/03/2024    CO2 25 11/03/2024    BUN 34 (H) 11/03/2024    CREATININE 1.7 (H) 11/03/2024    CALCIUM 8.8 11/03/2024    ANIONGAP 5 (L) 11/03/2024    EGFRNORACEVR 44 (A) 11/03/2024     Lab Results   Component Value Date    WBC 5.64 11/03/2024    HGB 10.3 (L) 11/03/2024    HCT 32.8 (L) 11/03/2024    MCV 89 11/03/2024     (L) 11/03/2024         Significant Imaging: reviewed    Assessment/Plan:     67 y/o male with CARINA after presenting with AF c RBR:     CARINA (acute kidney injury)  S Cr stable. Lower. Improving The improvement is not faster because BP is low.  Baseline Cr is normal  CARINA is resolving  CARINA likely was due to prerenal azotemia, secondary to AF, decreased renal perfusion.  K is normal  Na normal  Ca normal  Acid base no issues     HTN: BP is low  Pt is not on any major BP meds     Atrial fibrillation with RVR  Cardiac issues reviewed.  Presented with AF c RVR  Diastolic CHF, CAD     Management of AF reviewed  S/p amiodarone drip, followed by PO  Heparin drip  On beta blockers  On xarelto     Plans and recommendations:  As discussed above  Total time spent 40 minutes including time needed to review the records, the   patient evaluation, documentation, face-to-face discussion with the patient,   more than 50% of the time was spent on coordination of  care and counseling.    Level V visit.        Thank you for your consult.     Lashawn Hamilton MD  Nephrology  [facility] - Telemetry ([facility])

## 2024-11-04 NOTE — ASSESSMENT & PLAN NOTE
67 y/o male with CARINA after presenting with AF c RBR:     CARINA (acute kidney injury)  S Cr stable. Lower. Improving The improvement is not faster because BP is low.  Baseline Cr is normal  CARINA is resolving  CARINA likely was due to prerenal azotemia, secondary to AF, decreased renal perfusion.  K is normal  Na normal  Ca normal  Acid base no issues     HTN: BP is low  Pt is not on any major BP meds     Atrial fibrillation with RVR  Cardiac issues reviewed.  Presented with AF c RVR  Diastolic CHF, CAD     Management of AF reviewed  S/p amiodarone drip, followed by PO  Heparin drip  On beta blockers  On xarelto     Plans and recommendations:  As discussed above  Total time spent 40 minutes including time needed to review the records, the   patient evaluation, documentation, face-to-face discussion with the patient,   more than 50% of the time was spent on coordination of care and counseling.    Level V visit.

## 2024-11-04 NOTE — PLAN OF CARE
11/04/24 1351   Post-Acute Status   Post-Acute Authorization Placement  (SNF)   Post-Acute Placement Status Referrals Sent   Coverage [employer]   Discharge Delays None known at this time   Discharge Plan   Discharge Plan A Skilled Nursing Facility       SW meet with Patient at bedside to discuss therapy recommendation for Moderate Intense Therapy, via a Skilled Nursing Facility (SNF). SW explained SNF placement process and provided list of in-network SNF's. SW inquired if she could send referrals to facilities, to inquire about bed placement and see if they can accommodate Patient's needs. Patient agreeable to SNF referrals being sent. SW stated she would follow up with Patient once SNF's gave responses. Patient verbalized understanding. SNF referrals sent via [employer].     PASRR/ 142 pending.

## 2024-11-04 NOTE — PLAN OF CARE
A232/A232 SANTOS Abrams is a 68 y.o.male admitted on 10/29/2024 for Atrial fibrillation with RVR   Code Status: Full Code MRN: [id number]   Review of patient's allergies indicates:  No Known Allergies  Past Medical History:   Diagnosis Date    Arthritis     CHF (congestive heart failure)     COPD (chronic obstructive pulmonary disease)     Hypertension     Liver disease     Stroke       PRN meds    acetaminophen, 650 mg, Q6H PRN  influenza (adjuvanted), 0.5 mL, Prior to discharge  ondansetron, 8 mg, Q6H PRN  pneumoc 20-haylee conj-dip cr(PF), 0.5 mL, vaccine x 1 dose         Pt oriented x3(disoriented to time)  VSS.  Pt remained afebrile throughout this shift.   All meds administered per order.   Pt remained free of falls this shift.   Plan of care reviewed. Patient verbalizes understanding.   Pt turned q 2.  Bed low, side rails up x 2, wheels locked, call light in reach.   Patient instructed to call for assistance.  Patient education provided.             Orientation: disoriented to, time  Rashad Coma Scale Score: 14     Lead Monitored: Lead II Rhythm: normal sinus rhythm Frequency/Ectopy: PACs  Cardiac/Telemetry Box Number: [id number]  VTE Core Measure: Pharmacological prophylaxis initiated/maintained Last Bowel Movement: 11/03/24  Diet Renal  Voiding Characteristics: voids spontaneously without difficulty  Chris Score: 17  Fall Risk Score: 17  Accucheck []   Freq?      Lines/Drains/Airways       Peripheral Intravenous Line  Duration                  Peripheral IV - Single Lumen 11/01/24 2224 22 G Anterior;Left Forearm 2 days

## 2024-11-04 NOTE — SUBJECTIVE & OBJECTIVE
Interval History: Pt was seen and examined. Labs and meds reviewed. Discussed with other providers. No new issues, no c/o's.    Review of patient's allergies indicates:  No Known Allergies  Current Facility-Administered Medications   Medication Frequency    acetaminophen tablet 650 mg Q6H PRN    amiodarone tablet 200 mg Daily    arformoteroL nebulizer solution 15 mcg BID    aspirin EC tablet 81 mg Daily    atorvastatin tablet 80 mg QHS    budesonide nebulizer solution 0.5 mg Q12H    empagliflozin (Jardiance) tablet 10 mg Daily    famotidine tablet 20 mg Daily    influenza (adjuvanted) (Fluad) 45 mcg/0.5 mL IM vaccine (> or = 64 yo) 0.5 mL Prior to discharge    losartan tablet 25 mg Daily    metoprolol succinate (TOPROL-XL) 24 hr tablet 25 mg Daily    ondansetron injection 8 mg Q6H PRN    pneumoc 20-haylee conj-dip cr(PF) (PREVNAR-20 (PF)) injection Syrg 0.5 mL vaccine x 1 dose    rivaroxaban tablet 15 mg Daily with dinner    senna-docusate 8.6-50 mg per tablet 1 tablet BID       Objective:     Vital Signs (Most Recent):  Temp: 99.7 °F (37.6 °C) (11/03/24 1554)  Pulse: 92 (11/03/24 1712)  Resp: 17 (11/03/24 1554)  BP: 109/64 (11/03/24 1554)  SpO2: (!) 94 % (11/03/24 1554) Vital Signs (24h Range):  Temp:  [97.9 °F (36.6 °C)-99.7 °F (37.6 °C)] 99.7 °F (37.6 °C)  Pulse:  [53-92] 92  Resp:  [14-19] 17  SpO2:  [89 %-100 %] 94 %  BP: (102-138)/(61-85) 109/64     Weight: 86 kg (189 lb 9.5 oz) (10/31/24 0600)  Body mass index is 28 kg/m².  Body surface area is 2.05 meters squared.    I/O last 3 completed shifts:  In: 720 [P.O.:720]  Out: 3100 [Urine:3100]     Physical Exam  Constitutional:       Appearance: Normal appearance.   Cardiovascular:      Rate and Rhythm: Normal rate and regular rhythm.      Pulses: Normal pulses.      Heart sounds: Normal heart sounds.   Pulmonary:      Effort: Pulmonary effort is normal.      Breath sounds: Normal breath sounds.   Abdominal:      Palpations: Abdomen is soft.      Tenderness: There  is no guarding.   Musculoskeletal:      Right lower leg: No edema.      Left lower leg: No edema.   Neurological:      Mental Status: He is alert.   Psychiatric:         Behavior: Behavior normal.          Significant Labs: reviewed  BMP  Lab Results   Component Value Date     11/03/2024    K 5.1 11/03/2024     11/03/2024    CO2 25 11/03/2024    BUN 34 (H) 11/03/2024    CREATININE 1.7 (H) 11/03/2024    CALCIUM 8.8 11/03/2024    ANIONGAP 5 (L) 11/03/2024    EGFRNORACEVR 44 (A) 11/03/2024     Lab Results   Component Value Date    WBC 5.64 11/03/2024    HGB 10.3 (L) 11/03/2024    HCT 32.8 (L) 11/03/2024    MCV 89 11/03/2024     (L) 11/03/2024         Significant Imaging: reviewed

## 2024-11-04 NOTE — PT/OT/SLP PROGRESS
"Physical Therapy Treatment    Patient Name:  Layton Abrams   MRN:  [id number]    Recommendations:     Discharge Recommendations: Moderate Intensity Therapy  Discharge Equipment Recommendations: to be determined by next level of care  Barriers to discharge: None    Assessment:     Layton Abrams is a 68 y.o. male admitted with a medical diagnosis of Atrial fibrillation with RVR.  He presents with the following impairments/functional limitations: weakness, impaired endurance, impaired functional mobility, gait instability, impaired balance, pain, decreased safety awareness, decreased lower extremity function, decreased ROM, impaired cardiopulmonary response to activity.    Rehab Prognosis: Fair; patient would benefit from acute skilled PT services to address these deficits and reach maximum level of function.    Recent Surgery: * No surgery found *      Plan:     During this hospitalization, patient to be seen 3 x/week to address the identified rehab impairments via gait training, therapeutic activities, therapeutic exercises, neuromuscular re-education and progress toward the following goals:    Plan of Care Expires:  11/15/24    Subjective     Chief Complaint: Pt initially agreeable, once attempting to initiate sup>sit, pt reported "I don't feel like it, I'm tired."  Patient/Family Comments/goals: none stated  Pain/Comfort:  Pain Rating 1: 8/10  Location - Side 1: Bilateral  Location - Orientation 1: generalized  Location 1: leg  Pain Addressed 1: Reposition, Distraction, Cessation of Activity  Pain Rating Post-Intervention 1: 8/10    Objective:     Communicated with nurse and epic chart review prior to session. Patient found right sidelying with peripheral IV, bed alarm, pressure relief boots, telemetry upon PT entry to room.     General Precautions: Standard, fall  Orthopedic Precautions: N/A  Braces: N/A  Respiratory Status: Room air     Functional Mobility:  Gait belt applied - N/A  Bed Mobility  Rolling Left: " "minimum assistance  Rolling Right: minimum assistance  Supine Scooting: maximal assistance  Pt agreed to reposition in bed, refused all other functional mobility    Therapeutic Exercise  Pt refused    AM-PAC 6 CLICK MOBILITY  Turning over in bed (including adjusting bedclothes, sheets and blankets)?: 2 (scored from provious session, refused all functional mobility this date)  Sitting down on and standing up from a chair with arms (e.g., wheelchair, bedside commode, etc.): 1  Moving from lying on back to sitting on the side of the bed?: 2  Moving to and from a bed to a chair (including a wheelchair)?: 2  Need to walk in hospital room?: 1  Climbing 3-5 steps with a railing?: 1  Basic Mobility Total Score: 9       Treatment & Education:  Reviewed role of PT in acute care and POC. Pt refused all OOB/EOB activity and supine TherEx, despite max encouragement from therapist, due to pain and fatigue. Educated on the importance of OOB/EOB activity for his recovery. Educated on importance of consistent participation with PT. Educated on importance of TherEx to maintain/regain strength, encouraged to complete supine TherEx (hip flex, hip abd/add, heel slides, quad sets, ankle pumps) throughout the day. Encouraged frequent position changes to reduce the risk of pressure injury. Encouraged to sit up for all meals. Reviewed "call don't fall" policy and increased risk of falling due to weakness, instructed to utilize call bell for assistance with all transfers. Pt agreeable to all requests.    Patient left left sidelying with all lines intact, call button in reach, and bed alarm on..    GOALS:   Multidisciplinary Problems       Physical Therapy Goals          Problem: Physical Therapy    Goal Priority Disciplines Outcome Interventions   Physical Therapy Goal     PT, PT/OT Progressing    Description: Goals to be met by 11/15/24.  1. Pt will complete bed mobility MIN A.  2. Pt will complete sit to stand MIN A.  3. Pt will ambulate " 50ft MIN A using RW.  4. Pt will increase AMPAC score by 2 points to progress functional mobility.                       Time Tracking:     PT Received On: 11/04/24  PT Start Time: 0955     PT Stop Time: 1010  PT Total Time (min): 15 min     Billable Minutes: Therapeutic Activity 15min    Treatment Type: Treatment  PT/PTA: PT     Number of PTA visits since last PT visit: 0     11/04/2024

## 2024-11-04 NOTE — ASSESSMENT & PLAN NOTE
69 y/o male with CARINA after presenting with AF c RBR:     CARINA (acute kidney injury)  S Cr stable. Slowly improving, the improvement is not faster because low  Baseline Cr is normal  CARINA is resolving  CARINA likely was due to prerenal azotemia, secondary to AF, decreased renal perfusion.  K is normal  Na normal  Ca normal  Acid base no issues     HTN: BP is low  Pt is not on any major BP meds     Atrial fibrillation with RVR  Cardiac issues reviewed.  Presented with AF c RVR  Diastolic CHF, CAD     Management of AF reviewed  S/p amiodarone drip, followed by PO  Heparin drip  On beta blockers  On xarelto     Plans and recommendations:  As discussed above  Total time spent 40 minutes including time needed to review the records, the   patient evaluation, documentation, face-to-face discussion with the patient,   more than 50% of the time was spent on coordination of care and counseling.    Level V visit.

## 2024-11-04 NOTE — PLAN OF CARE
Reviewed role of PT in acute care and POC. Pt refused all OOB/EOB activity and supine TherEx, despite max encouragement from therapist, due to pain and fatigue. Educated on the importance of OOB/EOB activity for his recovery. Educated on importance of consistent participation with PT. Educated on importance of TherEx to maintain/regain strength, encouraged to complete supine TherEx (hip flex, hip abd/add, heel slides, quad sets, ankle pumps) throughout the day. Encouraged frequent position changes to reduce the risk of pressure injury. Encouraged to sit up in the chair for all meals. Recommending moderate intensity therapy upon d/c.

## 2024-11-04 NOTE — NURSING
Contacted vascular sx and spoke with billie who took consult information call back number provided

## 2024-11-05 DIAGNOSIS — N17.9 AKI (ACUTE KIDNEY INJURY): Primary | ICD-10-CM

## 2024-11-05 LAB
ALBUMIN SERPL BCP-MCNC: 3.2 G/DL (ref 3.5–5.2)
ALP SERPL-CCNC: 43 U/L (ref 40–150)
ALT SERPL W/O P-5'-P-CCNC: 14 U/L (ref 10–44)
ANION GAP SERPL CALC-SCNC: 9 MMOL/L (ref 8–16)
AST SERPL-CCNC: 22 U/L (ref 10–40)
BACTERIA BLD CULT: NORMAL
BASOPHILS # BLD AUTO: 0.02 K/UL (ref 0–0.2)
BASOPHILS NFR BLD: 0.3 % (ref 0–1.9)
BILIRUB SERPL-MCNC: 0.5 MG/DL (ref 0.1–1)
BUN SERPL-MCNC: 28 MG/DL (ref 8–23)
CALCIUM SERPL-MCNC: 8.7 MG/DL (ref 8.7–10.5)
CHLORIDE SERPL-SCNC: 106 MMOL/L (ref 95–110)
CO2 SERPL-SCNC: 21 MMOL/L (ref 23–29)
CREAT SERPL-MCNC: 1.5 MG/DL (ref 0.5–1.4)
DIFFERENTIAL METHOD BLD: ABNORMAL
EOSINOPHIL # BLD AUTO: 0.3 K/UL (ref 0–0.5)
EOSINOPHIL NFR BLD: 3.7 % (ref 0–8)
ERYTHROCYTE [DISTWIDTH] IN BLOOD BY AUTOMATED COUNT: 13.7 % (ref 11.5–14.5)
EST. GFR  (NO RACE VARIABLE): 51 ML/MIN/1.73 M^2
GLUCOSE SERPL-MCNC: 79 MG/DL (ref 70–110)
HCT VFR BLD AUTO: 34 % (ref 40–54)
HGB BLD-MCNC: 10.6 G/DL (ref 14–18)
IMM GRANULOCYTES # BLD AUTO: 0.03 K/UL (ref 0–0.04)
IMM GRANULOCYTES NFR BLD AUTO: 0.4 % (ref 0–0.5)
LYMPHOCYTES # BLD AUTO: 0.8 K/UL (ref 1–4.8)
LYMPHOCYTES NFR BLD: 10.8 % (ref 18–48)
MAGNESIUM SERPL-MCNC: 2 MG/DL (ref 1.6–2.6)
MCH RBC QN AUTO: 27.8 PG (ref 27–31)
MCHC RBC AUTO-ENTMCNC: 31.2 G/DL (ref 32–36)
MCV RBC AUTO: 89 FL (ref 82–98)
MONOCYTES # BLD AUTO: 0.6 K/UL (ref 0.3–1)
MONOCYTES NFR BLD: 8 % (ref 4–15)
NEUTROPHILS # BLD AUTO: 5.9 K/UL (ref 1.8–7.7)
NEUTROPHILS NFR BLD: 76.8 % (ref 38–73)
NRBC BLD-RTO: 0 /100 WBC
PHOSPHATE SERPL-MCNC: 3.5 MG/DL (ref 2.7–4.5)
PLATELET # BLD AUTO: 131 K/UL (ref 150–450)
PMV BLD AUTO: 10 FL (ref 9.2–12.9)
POCT GLUCOSE: 94 MG/DL (ref 70–110)
POTASSIUM SERPL-SCNC: 5.2 MMOL/L (ref 3.5–5.1)
PROT SERPL-MCNC: 6.6 G/DL (ref 6–8.4)
RBC # BLD AUTO: 3.81 M/UL (ref 4.6–6.2)
SODIUM SERPL-SCNC: 136 MMOL/L (ref 136–145)
WBC # BLD AUTO: 7.61 K/UL (ref 3.9–12.7)

## 2024-11-05 PROCEDURE — 83735 ASSAY OF MAGNESIUM: CPT | Performed by: NURSE PRACTITIONER

## 2024-11-05 PROCEDURE — 94761 N-INVAS EAR/PLS OXIMETRY MLT: CPT

## 2024-11-05 PROCEDURE — 99232 SBSQ HOSP IP/OBS MODERATE 35: CPT | Mod: ,,, | Performed by: STUDENT IN AN ORGANIZED HEALTH CARE EDUCATION/TRAINING PROGRAM

## 2024-11-05 PROCEDURE — 85025 COMPLETE CBC W/AUTO DIFF WBC: CPT | Performed by: NURSE PRACTITIONER

## 2024-11-05 PROCEDURE — 99233 SBSQ HOSP IP/OBS HIGH 50: CPT | Mod: ,,, | Performed by: INTERNAL MEDICINE

## 2024-11-05 PROCEDURE — 25000242 PHARM REV CODE 250 ALT 637 W/ HCPCS: Performed by: NURSE PRACTITIONER

## 2024-11-05 PROCEDURE — 25000003 PHARM REV CODE 250: Performed by: INTERNAL MEDICINE

## 2024-11-05 PROCEDURE — 25000003 PHARM REV CODE 250: Performed by: SPECIALIST

## 2024-11-05 PROCEDURE — 84100 ASSAY OF PHOSPHORUS: CPT | Performed by: NURSE PRACTITIONER

## 2024-11-05 PROCEDURE — 80053 COMPREHEN METABOLIC PANEL: CPT | Performed by: NURSE PRACTITIONER

## 2024-11-05 PROCEDURE — 94640 AIRWAY INHALATION TREATMENT: CPT

## 2024-11-05 PROCEDURE — 36415 COLL VENOUS BLD VENIPUNCTURE: CPT | Performed by: NURSE PRACTITIONER

## 2024-11-05 PROCEDURE — 97530 THERAPEUTIC ACTIVITIES: CPT

## 2024-11-05 PROCEDURE — 25000003 PHARM REV CODE 250: Performed by: STUDENT IN AN ORGANIZED HEALTH CARE EDUCATION/TRAINING PROGRAM

## 2024-11-05 PROCEDURE — 25000242 PHARM REV CODE 250 ALT 637 W/ HCPCS: Performed by: INTERNAL MEDICINE

## 2024-11-05 PROCEDURE — 25000003 PHARM REV CODE 250: Performed by: NURSE PRACTITIONER

## 2024-11-05 PROCEDURE — 21400001 HC TELEMETRY ROOM

## 2024-11-05 RX ADMIN — ASPIRIN 81 MG: 81 TABLET, COATED ORAL at 09:11

## 2024-11-05 RX ADMIN — ACETAMINOPHEN 650 MG: 325 TABLET ORAL at 09:11

## 2024-11-05 RX ADMIN — ARFORMOTEROL TARTRATE 15 MCG: 15 SOLUTION RESPIRATORY (INHALATION) at 08:11

## 2024-11-05 RX ADMIN — ARFORMOTEROL TARTRATE 15 MCG: 15 SOLUTION RESPIRATORY (INHALATION) at 09:11

## 2024-11-05 RX ADMIN — METOPROLOL SUCCINATE 25 MG: 25 TABLET, EXTENDED RELEASE ORAL at 09:11

## 2024-11-05 RX ADMIN — FAMOTIDINE 20 MG: 20 TABLET ORAL at 09:11

## 2024-11-05 RX ADMIN — BUDESONIDE INHALATION 0.5 MG: 0.5 SUSPENSION RESPIRATORY (INHALATION) at 09:11

## 2024-11-05 RX ADMIN — BUDESONIDE INHALATION 0.5 MG: 0.5 SUSPENSION RESPIRATORY (INHALATION) at 08:11

## 2024-11-05 RX ADMIN — RIVAROXABAN 20 MG: 20 TABLET, FILM COATED ORAL at 05:11

## 2024-11-05 RX ADMIN — EMPAGLIFLOZIN 10 MG: 10 TABLET, FILM COATED ORAL at 10:11

## 2024-11-05 RX ADMIN — AMIODARONE HYDROCHLORIDE 200 MG: 200 TABLET ORAL at 09:11

## 2024-11-05 RX ADMIN — SENNOSIDES AND DOCUSATE SODIUM 1 TABLET: 50; 8.6 TABLET ORAL at 09:11

## 2024-11-05 RX ADMIN — LOSARTAN POTASSIUM 12.5 MG: 25 TABLET, FILM COATED ORAL at 09:11

## 2024-11-05 RX ADMIN — ATORVASTATIN CALCIUM 80 MG: 40 TABLET, FILM COATED ORAL at 08:11

## 2024-11-05 NOTE — ASSESSMENT & PLAN NOTE
CARINA is likely due to  multiple factors, HF, afib, hypotension . Baseline creatinine is  0.7-1 . Most recent creatinine and eGFR are listed below.  Recent Labs     11/02/24  0548 11/03/24  0556 11/04/24  0522   CREATININE 2.1* 1.7* 1.6*   EGFRNORACEVR 34* 44* 47*        Plan  - CARINA is improving  - Avoid nephrotoxins and renally dose meds for GFR listed above  - Monitor urine output, serial BMP, and adjust therapy as needed  - appreciate nephrology

## 2024-11-05 NOTE — SUBJECTIVE & OBJECTIVE
Interval History: Pt was seen and examined. Labs and meds reviewed. Discussed with other providers. No new c/o's, no new issues.    Review of patient's allergies indicates:  No Known Allergies  Current Facility-Administered Medications   Medication Frequency    acetaminophen tablet 650 mg Q6H PRN    amiodarone tablet 200 mg Daily    arformoteroL nebulizer solution 15 mcg BID    aspirin EC tablet 81 mg Daily    atorvastatin tablet 80 mg QHS    budesonide nebulizer solution 0.5 mg Q12H    empagliflozin (Jardiance) tablet 10 mg Daily    famotidine tablet 20 mg Daily    influenza (adjuvanted) (Fluad) 45 mcg/0.5 mL IM vaccine (> or = 66 yo) 0.5 mL Prior to discharge    losartan split tablet 12.5 mg Daily    metoprolol succinate (TOPROL-XL) 24 hr tablet 25 mg Daily    ondansetron injection 8 mg Q6H PRN    pneumoc 20-haylee conj-dip cr(PF) (PREVNAR-20 (PF)) injection Syrg 0.5 mL vaccine x 1 dose    rivaroxaban tablet 20 mg Daily with dinner    senna-docusate 8.6-50 mg per tablet 1 tablet BID       Objective:     Vital Signs (Most Recent):  Temp: 98.2 °F (36.8 °C) (11/05/24 0742)  Pulse: 67 (11/05/24 0813)  Resp: 18 (11/05/24 0813)  BP: 118/74 (11/05/24 0742)  SpO2: 97 % (11/05/24 0813) Vital Signs (24h Range):  Temp:  [98.2 °F (36.8 °C)-99.1 °F (37.3 °C)] 98.2 °F (36.8 °C)  Pulse:  [] 67  Resp:  [16-19] 18  SpO2:  [94 %-98 %] 97 %  BP: (110-125)/(61-75) 118/74     Weight: 91.2 kg (201 lb 1 oz) (11/04/24 2319)  Body mass index is 29.69 kg/m².  Body surface area is 2.11 meters squared.    I/O last 3 completed shifts:  In: 44.6 [I.V.:44.6]  Out: -      Physical Exam  Vitals and nursing note reviewed.   Constitutional:       Appearance: Normal appearance.   Cardiovascular:      Rate and Rhythm: Normal rate and regular rhythm.      Pulses: Normal pulses.      Heart sounds: Normal heart sounds.   Pulmonary:      Effort: Pulmonary effort is normal.      Breath sounds: Normal breath sounds.   Abdominal:      Palpations: Abdomen  is soft.      Tenderness: There is no abdominal tenderness.   Musculoskeletal:      Right lower leg: No edema.      Left lower leg: No edema.   Neurological:      Mental Status: He is alert.          Significant Labs: reviewed  BMP  Lab Results   Component Value Date     11/05/2024    K 5.2 (H) 11/05/2024     11/05/2024    CO2 21 (L) 11/05/2024    BUN 28 (H) 11/05/2024    CREATININE 1.5 (H) 11/05/2024    CALCIUM 8.7 11/05/2024    ANIONGAP 9 11/05/2024    EGFRNORACEVR 51 (A) 11/05/2024       Significant Imaging: reviewed

## 2024-11-05 NOTE — CONSULTS
Consult    Consulting Physician:  David Gunderson DO  Reason for Consultation: leg pain    CC: No chief complaint on file.      HPI: Layton Abrams is a 68 y.o. male with PMHx as seen below, was admitted on 10/29/2024 with afib with rvr, weakness. He's on xarelto for dvt hx, copd, hcv, left peroneal dvt hx. Labs showed possible CARINA. Was admitted to critical care and resuscitated. He is now on the floor. Complains of pain with walking in both legs from the knees down. This pain is not at rest. He is able to take just a few steps before the pain starts. He denies previous vascular intervention.arterial duplex shows monophasic flow, eliud not obtained due to calcified vessels, there is occlusion of mid and distal left sfa.      Past Medical History:   Diagnosis Date    Arthritis     CHF (congestive heart failure)     COPD (chronic obstructive pulmonary disease)     Hypertension     Liver disease     Stroke        No past surgical history on file.    Social History     Socioeconomic History    Marital status: Unknown   Tobacco Use    Smoking status: Former     Current packs/day: 0.50     Types: Cigarettes    Smokeless tobacco: Never    Tobacco comments:     Quit 2010   Substance and Sexual Activity    Alcohol use: Not Currently    Drug use: Not Currently     Social Drivers of Health     Financial Resource Strain: Low Risk  (10/29/2024)    Overall Financial Resource Strain (CARDIA)     Difficulty of Paying Living Expenses: Not very hard   Food Insecurity: No Food Insecurity (10/29/2024)    Hunger Vital Sign     Worried About Running Out of Food in the Last Year: Never true     Ran Out of Food in the Last Year: Never true   Transportation Needs: No Transportation Needs (10/29/2024)    TRANSPORTATION NEEDS     Transportation : No   Stress: No Stress Concern Present (10/29/2024)    [employer] [place] [employer] - Occupational Stress Questionnaire     Feeling of Stress : Only a little   Housing Stability:  Low Risk  (10/29/2024)    Housing Stability Vital Sign     Unable to Pay for Housing in the Last Year: No     Homeless in the Last Year: No       No family history on file.      Current Facility-Administered Medications:     acetaminophen tablet 650 mg, 650 mg, Oral, Q6H PRN, Pili Barcenas ACNP-BC, 650 mg at 11/05/24 0953    [COMPLETED] amiodarone tablet 200 mg, 200 mg, Oral, BID, 200 mg at 10/31/24 2010 **FOLLOWED BY** amiodarone tablet 200 mg, 200 mg, Oral, Daily, Joe Jennings MD, 200 mg at 11/05/24 0947    arformoteroL nebulizer solution 15 mcg, 15 mcg, Nebulization, BID, 15 mcg at 11/05/24 0813 **AND** Inhalation Treatment BID, , , BID, Pili Barcenas ACNP-BC    aspirin EC tablet 81 mg, 81 mg, Oral, Daily, Yosef Ocasio MD, 81 mg at 11/05/24 0947    atorvastatin tablet 80 mg, 80 mg, Oral, QHS, Yosef Ocasio MD, 80 mg at 11/04/24 2023    budesonide nebulizer solution 0.5 mg, 0.5 mg, Nebulization, Q12H, Pili Barcenas ACNP-BC, 0.5 mg at 11/05/24 0813    empagliflozin (Jardiance) tablet 10 mg, 10 mg, Oral, Daily, Yosef Ocasio MD, 10 mg at 11/05/24 1024    famotidine tablet 20 mg, 20 mg, Oral, Daily, Pili Barcenas ACNP-BC, 20 mg at 11/05/24 0947    influenza (adjuvanted) (Fluad) 45 mcg/0.5 mL IM vaccine (> or = 66 yo) 0.5 mL, 0.5 mL, Intramuscular, Prior to discharge, Joe Jennings MD    losartan split tablet 12.5 mg, 12.5 mg, Oral, Daily, Lashawn Hamilton MD, 12.5 mg at 11/05/24 0947    metoprolol succinate (TOPROL-XL) 24 hr tablet 25 mg, 25 mg, Oral, Daily, Yosef Ocasio MD, 25 mg at 11/05/24 0947    ondansetron injection 8 mg, 8 mg, Intravenous, Q6H PRN, Pili Barcenas, SEANP-BC    pneumoc 20-haylee conj-dip cr(PF) (PREVNAR-20 (PF)) injection Syrg 0.5 mL, 0.5 mL, Intramuscular, vaccine x 1 dose, Joe Jennings MD    rivaroxaban tablet 20 mg, 20 mg, Oral, Daily with dinner, David Gunderson DO senna-docusate 8.6-50 mg per tablet 1 tablet, 1 tablet, Oral, BID, Makayla Dickey NP, 1 tablet  "at 11/05/24 0947    Review of patient's allergies indicates:  No Known Allergies    ROS:  10 point review of systems is negative except as mentioned in HPI.    Vitals:    11/05/24 1621   BP: (!) 98/56   Pulse: (!) 48   Resp: 19   Temp: 98.8 °F (37.1 °C)       Objective:  Vital signs: (most recent): Blood pressure (!) 98/56, pulse (!) 48, temperature 98.8 °F (37.1 °C), temperature source Oral, resp. rate 19, height 5' 9" (1.753 m), weight 91.2 kg (201 lb 1 oz), SpO2 100%.    Vss  Gen nad alert oriented  Cv rrr  Lung adventitious lung sounds audible  Non palpable pedal pulses, mild peripheral edema  Sensation intact  Motor intact    LABS:    IMAGING:  I have personally reviewed the imaging.    US Lower Extrem Arteries Bilat with GEOVANNY (xpd)   Final Result      1.  Markedly abnormal study.  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  There is monophasic flow involving all vessels imaged with marked plaque formation diffusely.  There are low velocities involving the bilateral popliteal arteries and trifurcation vessels.      2.  There are elevated velocities involving both profunda femoral arteries, consistent with significant stenosis.      3.  The bilateral ABIs are unable to be measured due to noncompliance ankle vessels.         Electronically signed by: Bentley Dumont MD   Date:    11/02/2024   Time:    18:56      US Kidney   Final Result      No hydronephrosis         Electronically signed by: Umair Grant MD   Date:    10/30/2024   Time:    11:06      X-Ray Chest AP Portable   Final Result      Right-sided PICC is satisfactory         Electronically signed by: Robin Sanchez   Date:    10/29/2024   Time:    22:46          MDM      Assessment & Plan  Layton Abrams is a 68 y.o. male with peripheral vascular disease lifestyle limiting claudication bilateral lower extremities. Worse disease on left leg per arterial duplex. Will allow him to be discharged from hospital when medically " cleared and bring back on outpatient basis for left leg angiogram possible intervention and right leg intervention in the near future as well. He is not in rest pain and this is not an acute limb ischemia picture. Will give him 2-3 weeks to recover from carina and his afib episode.      Emily Leavitt  11/5/2024  [facility]  Vascular Surgery  [phone] (Clinic Number)    Active Hospital Problems    Diagnosis  POA    *Atrial fibrillation with RVR [I48.91]  Yes    PAD (peripheral artery disease) [I73.9]  Yes    CARINA (acute kidney injury) [N17.9]  Yes    Hyperkalemia [E87.5]  Yes    Panlobular emphysema [J43.1]  Yes    HFrEF (heart failure with reduced ejection fraction) [I50.20]  Yes      Resolved Hospital Problems    Diagnosis Date Resolved POA    Hypotension [I95.9] 11/04/2024 Yes

## 2024-11-05 NOTE — ASSESSMENT & PLAN NOTE
Hyperkalemia is likely due to CARINA.The patients most recent potassium results are listed below.  Recent Labs     11/02/24  0548 11/03/24  0556 11/04/24  0522   K 5.0 5.1 5.1       Plan  - Monitor for arrhythmias with EKG and/or continuous telemetry.   - may need potassium binder

## 2024-11-05 NOTE — PROGRESS NOTES
[facility] Medicine  Progress Note    Patient Name: Layton Abrams  MRN: [id number]  Patient Class: IP- Inpatient   Admission Date: 10/29/2024  Length of Stay: 6 days  Attending Physician: David Gunderson DO  Primary Care Provider: Nellie Cadena MD        Subjective:     Principal Problem:Atrial fibrillation with RVR        HPI:  The patient is 68-year-old male with a history of atrial fibrillation, coronary artery disease with stenting, hypertension, stroke with right-sided deficits, COPD, HCV, left peroneal DVT, Xarelto use, and lower extremity cellulitis presented to [facility] Emergency Department on October 29 with weakness that began on the morning of presentation.  By report he had been feeling well the day prior but did not feel well on October 29.  In the emergency department he had AFib with RVR and hypotension, but oxygenation was stable on room air.  Patient was drowsy but would wake and speak without difficulty.  He reported no chest pain or shortness of breath.  Labs were concerning for CARINA with  and creatinine 5.38.  Potassium was 7.  High sensitivity troponin was in the normal range at 40.  Chest x-ray had clear lungs but did show marked cardiomegaly.  ED attempted cardioversion with the AFib with RVR, but the patient did not cardiovert.  By report, EKGs did not show evidence of significant peak T-waves. He received calcium, dextrose/insulin, lactated Ringer's, IV metoprolol, IV bicarbonate, and Lokelma.  Levophed infusion was initiated.  Blood pressure improved with the Levophed, but tachycardia persisted.  Patient has a PICC line in place.  He also had a Wilcox catheter placed and had a small amount of urine with catheter placement.  Case discussed with the ED doctor and with Critical Care Medicine at [facility].  Patient transfer to [facility] and admitted to the ICU. He was placed on bicarb and amiodarone infusions.         Overview/Hospital Course:  The patient presented with weakness that began on the morning of presentation. By report he had been feeling well the day prior but did not feel well on October 29. In the emergency department he had AFib with RVR and hypotension, but oxygenation was stable on room air. Patient was drowsy but would wake and speak without difficulty. He reported no chest pain or shortness of breath. Labs were concerning for CARINA with  and creatinine 5.38. Potassium was 7. High sensitivity troponin was in the normal range at 40. Chest x-ray had clear lungs but did show marked cardiomegaly. ED attempted cardioversion with the AFib with RVR, but the patient did not cardiovert. By report, EKGs did not show evidence of significant peak T-waves. He received calcium, dextrose/insulin, lactated Ringer's, IV metoprolol, IV bicarbonate, and Lokelma. Levophed infusion was initiated. Blood pressure improved with the Levophed, but tachycardia persisted. Patient has a PICC line in place. He also had a Wilcox catheter placed and had a small amount of urine with catheter placement. Case discussed with the ED doctor and with Critical Care Medicine at [facility]. Patient transfer to [facility] and admitted to the ICU. He was placed on bicarb and amiodarone infusions. He was weaned off of levophed.  His renal function improved. Arterial US revealed PAD and occluded sfa. Vascular surgery was consulted.     Interval History: .No acute events overnight, afebrile, hemodynamically stable.  Pending vascular surgery evaluation of PAD concerns. SNF placement pending.  Denies any acute concerns    Objective:     Vital Signs (Most Recent):  Temp: 98.3 °F (36.8 °C) (11/04/24 1923)  Pulse: 61 (11/04/24 1923)  Resp: 18 (11/04/24 1923)  BP: 122/64 (11/04/24 1923)  SpO2: 98 % (11/04/24 1923) Vital Signs (24h Range):  Temp:  [97.9 °F (36.6 °C)-99 °F (37.2 °C)] 98.3 °F (36.8 °C)  Pulse:  [58-87] 61  Resp:  [16-19]  18  SpO2:  [96 %-98 %] 98 %  BP: (110-133)/(61-91) 122/64     Weight: 86 kg (189 lb 9.5 oz)  Body mass index is 28 kg/m².    Intake/Output Summary (Last 24 hours) at 11/4/2024 1939  Last data filed at 11/4/2024 1728  Gross per 24 hour   Intake 44.64 ml   Output --   Net 44.64 ml         Physical Exam  Vitals and nursing note reviewed.   Constitutional:       General: He is not in acute distress.     Appearance: He is ill-appearing. He is not toxic-appearing or diaphoretic.   HENT:      Head: Normocephalic and atraumatic.      Mouth/Throat:      Mouth: Mucous membranes are moist.   Eyes:      General: No scleral icterus.        Right eye: No discharge.         Left eye: No discharge.   Cardiovascular:      Rate and Rhythm: Normal rate and regular rhythm.      Heart sounds: Normal heart sounds.   Pulmonary:      Effort: Pulmonary effort is normal. No respiratory distress.   Abdominal:      General: Bowel sounds are normal.      Tenderness: There is no abdominal tenderness.   Musculoskeletal:      Cervical back: No rigidity.      Right lower leg: No edema.      Left lower leg: No edema.   Skin:     General: Skin is warm and dry.      Coloration: Skin is not jaundiced.   Neurological:      Mental Status: He is alert and oriented to person, place, and time. Mental status is at baseline.   Psychiatric:         Mood and Affect: Mood normal.         Behavior: Behavior normal.             Significant Labs: All pertinent labs within the past 24 hours have been reviewed.  LABS:  Recent Labs   Lab 11/02/24  0548 11/03/24  0556 11/04/24  0522    138 136   K 5.0 5.1 5.1    108 109   CO2 22* 25 20*   BUN 41* 34* 31*   CREATININE 2.1* 1.7* 1.6*   GLU 82 79 81   ANIONGAP 11 5* 7*     Recent Labs   Lab 11/02/24  0548 11/03/24  0556 11/04/24  0522   MG 1.8 1.8 1.8   PHOS 3.2 3.2 3.2     Recent Labs   Lab 10/30/24  0545 10/31/24  0436 11/01/24  0420 11/02/24  0548 11/03/24  0556 11/04/24  0522   AST 20 16   < > 25 22 22    ALT 13 8*   < > 13 11 12   ALKPHOS 48 32*   < > 37* 38* 41   BILITOT 0.3 0.2   < > 0.5 0.4 0.5   BILIDIR 0.1 <0.1*  --   --   --   --    ALBUMIN 3.7 2.7*  2.7*   < > 3.4* 3.0* 3.1*    < > = values in this interval not displayed.     POCT Glucose:   Recent Labs   Lab 10/29/24  2334 10/29/24  2357 10/30/24  0057   POCTGLUCOSE 146* 186* 155*    Recent Labs   Lab 11/02/24  0548 11/03/24  0556 11/04/24  0522   WBC 6.91 5.64 7.69   HGB 10.8* 10.3* 10.7*   HCT 33.7* 32.8* 34.4*   * 110* 118*   GRAN 71.1  4.9 71.0  4.0 74.3*  5.7        Micro  Blood Cultures  Lab Results   Component Value Date    LABBLOO No Growth to date 10/30/2024    LABBLOO No Growth to date 10/30/2024    LABBLOO No Growth to date 10/30/2024    LABBLOO No Growth to date 10/30/2024    LABBLOO No Growth to date 10/30/2024       Significant Imaging: I have reviewed all pertinent imaging results/findings within the past 24 hours.  US Lower Extrem Arteries Bilat with GEOVANNY (xpd)   Final Result      1.  Markedly abnormal study.  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  There is monophasic flow involving all vessels imaged with marked plaque formation diffusely.  There are low velocities involving the bilateral popliteal arteries and trifurcation vessels.      2.  There are elevated velocities involving both profunda femoral arteries, consistent with significant stenosis.      3.  The bilateral ABIs are unable to be measured due to noncompliance ankle vessels.         Electronically signed by: Bentley Dumont MD   Date:    11/02/2024   Time:    18:56      US Kidney   Final Result      No hydronephrosis         Electronically signed by: Umair Grant MD   Date:    10/30/2024   Time:    11:06      X-Ray Chest AP Portable   Final Result      Right-sided PICC is satisfactory         Electronically signed by: Robin Sanchez   Date:    10/29/2024   Time:    22:46          Inpatient Medications:  Continuous  "Infusions:  Scheduled Meds:   amiodarone  200 mg Oral Daily    arformoteroL  15 mcg Nebulization BID    aspirin  81 mg Oral Daily    atorvastatin  80 mg Oral QHS    budesonide  0.5 mg Nebulization Q12H    empagliflozin  10 mg Oral Daily    famotidine  20 mg Oral Daily    losartan  25 mg Oral Daily    metoprolol succinate  25 mg Oral Daily    rivaroxaban  15 mg Oral Daily with dinner    senna-docusate 8.6-50 mg  1 tablet Oral BID     PRN Meds:  Current Facility-Administered Medications:     acetaminophen, 650 mg, Oral, Q6H PRN    influenza (adjuvanted), 0.5 mL, Intramuscular, Prior to discharge    ondansetron, 8 mg, Intravenous, Q6H PRN    pneumoc 20-haylee conj-dip cr(PF), 0.5 mL, Intramuscular, vaccine x 1 dose      Assessment/Plan:      * Atrial fibrillation with RVR  Patient has paroxysmal (<7 days) atrial fibrillation. Patient is currently in sinus rhythm. [id number] Score: 1. The patients heart rate in the last 24 hours is as follows:  Pulse  Min: 58  Max: 87     Antiarrhythmics  amiodarone tablet 200 mg, Daily, Oral  metoprolol succinate (TOPROL-XL) 24 hr tablet 25 mg, Daily, Oral    Anticoagulants  rivaroxaban tablet 15 mg, With dinner, Oral    Plan  - Replete lytes with a goal of K>4, Mg >2  - Patient is anticoagulated, see medications listed above.  - Patient's afib is currently controlled  - cardiology following        PAD (peripheral artery disease)  Lower extremity altered ultrasound noted "occlusion of the left mid and distal superficial femoral arteries "    -consult vascular surgery  -continue current medications      HFrEF (heart failure with reduced ejection fraction)  Patient has Systolic (HFrEF) heart failure that is Acute on chronic. On presentation their CHF was decompensated. Evidence of decompensated CHF on presentation includes: elevated JVD and shortness of breath. The etiology of their decompensation is likely atrial fibrillation . Most recent BNP and echo results are listed below.  No results " "for input(s): "BNP" in the last 72 hours.    Latest ECHO  Results for orders placed during the hospital encounter of 10/29/24    Echo    Interpretation Summary    Left Ventricle: The left ventricle is normal in size. Ventricular mass is normal. Mildly increased wall thickness. There is concentric remodeling. There is normal systolic function with a visually estimated ejection fraction of 60 - 65%. Unable to assess diastolic function due to atrial fibrillation.    Right Ventricle: Severe right ventricular enlargement. Wall thickness is normal. Systolic function is moderately reduced.    Right Atrium: Right atrium is severely dilated.    Tricuspid Valve: There is severe regurgitation.    Pulmonic Valve: There is mild regurgitation.    Pulmonary Artery: There is moderate pulmonary hypertension. The estimated pulmonary artery systolic pressure is 59 mmHg.    IVC/SVC: Elevated venous pressure at 15 mmHg.    Current Heart Failure Medications  metoprolol succinate (TOPROL-XL) 24 hr tablet 25 mg, Daily, Oral  empagliflozin (Jardiance) tablet 10 mg, Daily, Oral  losartan tablet 25 mg, Daily, Oral    Plan  - Monitor strict I&Os and daily weights.    - Place on telemetry  - Low sodium diet  - Cardiology has been consulted  - The patient's volume status is improving but not at their baseline as indicated by elevated JVD            Panlobular emphysema  Patient's COPD is controlled currently.  Patient is currently off COPD Pathway. Continue scheduled inhalers Supplemental oxygen and monitor respiratory status closely.     Hyperkalemia  Hyperkalemia is likely due to CARINA.The patients most recent potassium results are listed below.  Recent Labs     11/02/24  0548 11/03/24  0556 11/04/24  0522   K 5.0 5.1 5.1       Plan  - Monitor for arrhythmias with EKG and/or continuous telemetry.   - may need potassium binder              CARINA (acute kidney injury)  CARINA is likely due to  multiple factors, HF, afib, hypotension . Baseline " creatinine is  0.7-1 . Most recent creatinine and eGFR are listed below.  Recent Labs     11/02/24  0548 11/03/24  0556 11/04/24  0522   CREATININE 2.1* 1.7* 1.6*   EGFRNORACEVR 34* 44* 47*        Plan  - CARINA is improving  - Avoid nephrotoxins and renally dose meds for GFR listed above  - Monitor urine output, serial BMP, and adjust therapy as needed  - appreciate nephrology      VTE Risk Mitigation (From admission, onward)           Ordered     rivaroxaban tablet 15 mg  With dinner         10/31/24 0912                    Discharge Planning   WIL:      Code Status: Full Code   Is the patient medically ready for discharge?:     Reason for patient still in hospital (select all that apply): Consult recommendations  Discharge Plan A: Skilled Nursing Facility   Discharge Delays: None known at this time              David Gunderson DO  Department of Hospital Medicine   [facility] - Telemetry ([facility])    Voice recognition software was used in the creation of this note/communication and any sound-alike/typographical errors which may have occurred despite initial review prior to signing should be taken in context when interpreting.  If such errors prevent a clear understanding of the note/communication, please contact the provider/office for clarification. \

## 2024-11-05 NOTE — ASSESSMENT & PLAN NOTE
Patient has paroxysmal (<7 days) atrial fibrillation. Patient is currently in sinus rhythm. [id number] Score: 1. The patients heart rate in the last 24 hours is as follows:  Pulse  Min: 58  Max: 87     Antiarrhythmics  amiodarone tablet 200 mg, Daily, Oral  metoprolol succinate (TOPROL-XL) 24 hr tablet 25 mg, Daily, Oral    Anticoagulants  rivaroxaban tablet 15 mg, With dinner, Oral    Plan  - Replete lytes with a goal of K>4, Mg >2  - Patient is anticoagulated, see medications listed above.  - Patient's afib is currently controlled  - cardiology following

## 2024-11-05 NOTE — ASSESSMENT & PLAN NOTE
69 y/o male with CARINA after presenting with AF c RBR:     CARINA (acute kidney injury)  S Cr slowly Improving   Baseline Cr is normal  CARINA is resolving, likely due to prerenal azotemia, secondary to AF, decreased renal perfusion.  K is normal  Na normal  Ca normal  Acid base, mild metabolic acidosis     HTN: BP is low, but stable  Pt is not on any major BP meds     Atrial fibrillation with RVR  Cardiac issues reviewed.  Presented with AF c RVR  Diastolic CHF, CAD     Management of AF reviewed  S/p amiodarone drip, followed by PO  Heparin drip  On beta blockers  On xarelto     Plans and recommendations:  As discussed above  Total time spent 40 minutes including time needed to review the records, the   patient evaluation, documentation, face-to-face discussion with the patient,   more than 50% of the time was spent on coordination of care and counseling.    Level V visit.

## 2024-11-05 NOTE — PLAN OF CARE
MIN A needed for B rolling. Pt refused OOB and EOB activity. Recommending moderate intensity therapy upon d/c.

## 2024-11-05 NOTE — SUBJECTIVE & OBJECTIVE
Interval History: .No acute events overnight, afebrile, hemodynamically stable.  Pending vascular surgery evaluation of PAD concerns. SNF placement pending.  Denies any acute concerns    Objective:     Vital Signs (Most Recent):  Temp: 98.3 °F (36.8 °C) (11/04/24 1923)  Pulse: 61 (11/04/24 1923)  Resp: 18 (11/04/24 1923)  BP: 122/64 (11/04/24 1923)  SpO2: 98 % (11/04/24 1923) Vital Signs (24h Range):  Temp:  [97.9 °F (36.6 °C)-99 °F (37.2 °C)] 98.3 °F (36.8 °C)  Pulse:  [58-87] 61  Resp:  [16-19] 18  SpO2:  [96 %-98 %] 98 %  BP: (110-133)/(61-91) 122/64     Weight: 86 kg (189 lb 9.5 oz)  Body mass index is 28 kg/m².    Intake/Output Summary (Last 24 hours) at 11/4/2024 1939  Last data filed at 11/4/2024 1728  Gross per 24 hour   Intake 44.64 ml   Output --   Net 44.64 ml         Physical Exam  Vitals and nursing note reviewed.   Constitutional:       General: He is not in acute distress.     Appearance: He is ill-appearing. He is not toxic-appearing or diaphoretic.   HENT:      Head: Normocephalic and atraumatic.      Mouth/Throat:      Mouth: Mucous membranes are moist.   Eyes:      General: No scleral icterus.        Right eye: No discharge.         Left eye: No discharge.   Cardiovascular:      Rate and Rhythm: Normal rate and regular rhythm.      Heart sounds: Normal heart sounds.   Pulmonary:      Effort: Pulmonary effort is normal. No respiratory distress.   Abdominal:      General: Bowel sounds are normal.      Tenderness: There is no abdominal tenderness.   Musculoskeletal:      Cervical back: No rigidity.      Right lower leg: No edema.      Left lower leg: No edema.   Skin:     General: Skin is warm and dry.      Coloration: Skin is not jaundiced.   Neurological:      Mental Status: He is alert and oriented to person, place, and time. Mental status is at baseline.   Psychiatric:         Mood and Affect: Mood normal.         Behavior: Behavior normal.             Significant Labs: All pertinent labs within  the past 24 hours have been reviewed.  LABS:  Recent Labs   Lab 11/02/24  0548 11/03/24  0556 11/04/24  0522    138 136   K 5.0 5.1 5.1    108 109   CO2 22* 25 20*   BUN 41* 34* 31*   CREATININE 2.1* 1.7* 1.6*   GLU 82 79 81   ANIONGAP 11 5* 7*     Recent Labs   Lab 11/02/24  0548 11/03/24  0556 11/04/24  0522   MG 1.8 1.8 1.8   PHOS 3.2 3.2 3.2     Recent Labs   Lab 10/30/24  0545 10/31/24  0436 11/01/24  0420 11/02/24  0548 11/03/24  0556 11/04/24  0522   AST 20 16   < > 25 22 22   ALT 13 8*   < > 13 11 12   ALKPHOS 48 32*   < > 37* 38* 41   BILITOT 0.3 0.2   < > 0.5 0.4 0.5   BILIDIR 0.1 <0.1*  --   --   --   --    ALBUMIN 3.7 2.7*  2.7*   < > 3.4* 3.0* 3.1*    < > = values in this interval not displayed.     POCT Glucose:   Recent Labs   Lab 10/29/24  2334 10/29/24  2357 10/30/24  0057   POCTGLUCOSE 146* 186* 155*    Recent Labs   Lab 11/02/24  0548 11/03/24  0556 11/04/24  0522   WBC 6.91 5.64 7.69   HGB 10.8* 10.3* 10.7*   HCT 33.7* 32.8* 34.4*   * 110* 118*   GRAN 71.1  4.9 71.0  4.0 74.3*  5.7        Micro  Blood Cultures  Lab Results   Component Value Date    LABBLOO No Growth to date 10/30/2024    LABBLOO No Growth to date 10/30/2024    LABBLOO No Growth to date 10/30/2024    LABBLOO No Growth to date 10/30/2024    LABBLOO No Growth to date 10/30/2024       Significant Imaging: I have reviewed all pertinent imaging results/findings within the past 24 hours.  US Lower Extrem Arteries Bilat with GEOVANNY (xpd)   Final Result      1.  Markedly abnormal study.  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  There is monophasic flow involving all vessels imaged with marked plaque formation diffusely.  There are low velocities involving the bilateral popliteal arteries and trifurcation vessels.      2.  There are elevated velocities involving both profunda femoral arteries, consistent with significant stenosis.      3.  The bilateral ABIs are unable to be measured  due to noncompliance ankle vessels.         Electronically signed by: Bentley Dumont MD   Date:    11/02/2024   Time:    18:56      US Kidney   Final Result      No hydronephrosis         Electronically signed by: Umair Grant MD   Date:    10/30/2024   Time:    11:06      X-Ray Chest AP Portable   Final Result      Right-sided PICC is satisfactory         Electronically signed by: Robin Sanchez   Date:    10/29/2024   Time:    22:46          Inpatient Medications:  Continuous Infusions:  Scheduled Meds:   amiodarone  200 mg Oral Daily    arformoteroL  15 mcg Nebulization BID    aspirin  81 mg Oral Daily    atorvastatin  80 mg Oral QHS    budesonide  0.5 mg Nebulization Q12H    empagliflozin  10 mg Oral Daily    famotidine  20 mg Oral Daily    losartan  25 mg Oral Daily    metoprolol succinate  25 mg Oral Daily    rivaroxaban  15 mg Oral Daily with dinner    senna-docusate 8.6-50 mg  1 tablet Oral BID     PRN Meds:  Current Facility-Administered Medications:     acetaminophen, 650 mg, Oral, Q6H PRN    influenza (adjuvanted), 0.5 mL, Intramuscular, Prior to discharge    ondansetron, 8 mg, Intravenous, Q6H PRN    pneumoc 20-haylee conj-dip cr(PF), 0.5 mL, Intramuscular, vaccine x 1 dose

## 2024-11-05 NOTE — PT/OT/SLP PROGRESS
Physical Therapy Treatment    Patient Name:  Layton Abrams   MRN:  [id number]    Recommendations:     Discharge Recommendations: Moderate Intensity Therapy  Discharge Equipment Recommendations: to be determined by next level of care  Barriers to discharge: Decreased caregiver support    Assessment:     Layton Abrams is a 68 y.o. male admitted with a medical diagnosis of Atrial fibrillation with RVR.  He presents with the following impairments/functional limitations: weakness, impaired endurance, impaired functional mobility, gait instability, impaired balance, pain, decreased safety awareness, decreased lower extremity function, decreased ROM, impaired cardiopulmonary response to activity.    Rehab Prognosis: Fair; patient would benefit from acute skilled PT services to address these deficits and reach maximum level of function.    Recent Surgery: * No surgery found *      Plan:     During this hospitalization, patient to be seen 3 x/week to address the identified rehab impairments via gait training, therapeutic activities, therapeutic exercises, neuromuscular re-education and progress toward the following goals:    Plan of Care Expires:  11/15/24    Subjective     Chief Complaint: Pt refused all OOB and EOB activity despite encouragement and education by therapist and nurse  Patient/Family Comments/goals: none stated  Pain/Comfort:  Pain Rating 1: 7/10  Location - Side 1: Bilateral  Location - Orientation 1: generalized  Location 1: leg  Pain Addressed 1: Reposition, Distraction  Pain Rating Post-Intervention 1: 7/10      Objective:     Communicated with nurse Guerra and [facility] chart review prior to session.  Patient found supine with peripheral IV, bed alarm, telemetry upon PT entry to room.     General Precautions: Standard, fall  Orthopedic Precautions: N/A  Braces: N/A  Respiratory Status: Room air     Functional Mobility:  Gait belt applied - N/A  Increased verbal cuing needed.   Bed Mobility  Rolling Left:  "minimum assistance  Rolling Right: minimum assistance  Multiple reps of B rolling complete to change soiled brief/pad, pt required total A to be cleaned  Supine Scooting: total assistance and of 2 persons  Transfers  Pt refused all OOB and EOB activity    Therapeutic Exercise  Refused      AM-PAC 6 CLICK MOBILITY  Turning over in bed (including adjusting bedclothes, sheets and blankets)?: 3  Sitting down on and standing up from a chair with arms (e.g., wheelchair, bedside commode, etc.): 1 (scored from provious session, refused this date)  Moving from lying on back to sitting on the side of the bed?: 2 (scored from provious session, refused this date)  Moving to and from a bed to a chair (including a wheelchair)?: 2 (scored from provious session, refused this date)  Need to walk in hospital room?: 1 (scored from provious session, refused this date)  Climbing 3-5 steps with a railing?: 1 (NT)  Basic Mobility Total Score: 10       Treatment & Education:  Reviewed role of PT in acute care and POC. Pt refused all OOB/EOB activity and supine TherEx, despite max encouragement from therapist, due to pain. Educated on the importance of OOB/EOB activity for his recovery. Educated on importance of consistent participation with PT. Educated on importance of TherEx to maintain/regain strength, encouraged to complete supine TherEx (hip flex, hip abd/add, heel slides, quad sets, ankle pumps) throughout the day. Encouraged frequent position changes to reduce the risk of pressure injury. Encouraged to sit up in the chair for all meals. Encouraged positioning B LE in extension to reduce risk of contractures. Reviewed "call don't fall" policy and increased risk of falling due to weakness, instructed to utilize call bell for assistance with all transfers. Pt agreeable to all requests.    Patient left HOB elevated with all lines intact, call button in reach, and bed alarm on..    GOALS:   Multidisciplinary Problems       Physical " Therapy Goals          Problem: Physical Therapy    Goal Priority Disciplines Outcome Interventions   Physical Therapy Goal     PT, PT/OT Progressing    Description: Goals to be met by 11/15/24.  1. Pt will complete bed mobility MIN A.  2. Pt will complete sit to stand MIN A.  3. Pt will ambulate 50ft MIN A using RW.  4. Pt will increase AMPAC score by 2 points to progress functional mobility.                       Time Tracking:     PT Received On: 11/05/24  PT Start Time: 0950     PT Stop Time: 1005  PT Total Time (min): 15 min     Billable Minutes: Therapeutic Activity 15min    Treatment Type: Treatment  PT/PTA: PT     Number of PTA visits since last PT visit: 0     11/05/2024

## 2024-11-05 NOTE — PLAN OF CARE
11/05/24 1317   Post-Acute Status   Post-Acute Authorization Placement  (SNF)   Post-Acute Placement Status Referrals Sent   Coverage [employer] Medicare   Discharge Delays None known at this time   Discharge Plan   Discharge Plan A Skilled Nursing Facility       JANENE spoke to Patient's daughter, Nasreen, over the phone, to discuss SNF placement.  Patient's daughter was in agreement to SNF placement and inquired about accepting facilities. SW stated Patient was accepted by a few facilities in [facility], and [facility]. Patient's daughter inquired about [facility], due to being close to patient's home and Patient's mother being in that facility.   SW stated the referral was not sent to [facility], but she would send information and contact liaison regarding availability and accommodations.  Patients daughter verbalized understanding and inquired about other facilities. SW stated she could email a copy of accepting SNF's to an email address. Patient's daughter provided email [email or web address]. JANENE emailed list of accepting SNF's to Patient's daughter for review.     JANENE sent referral to [facility] and attempted to contact liaison. Liaison did not answer, however referral was sent via [employer].    Patient's PASRR/ 142 received and uploaded to [employer].    JANENE will continue to follow and assist as needed.

## 2024-11-05 NOTE — ASSESSMENT & PLAN NOTE
"Lower extremity altered ultrasound noted "occlusion of the left mid and distal superficial femoral arteries "    -consult vascular surgery  -continue current medications    "

## 2024-11-05 NOTE — PROGRESS NOTES
[facility] - Telemetry ([facility])  Nephrology  Progress Note    Patient Name: Layton Abrams  MRN: [id number]  Admission Date: 10/29/2024  Hospital Length of Stay: 7 days  Attending Provider: David Gunderson DO   Primary Care Physician: Nellie Cadena MD  Principal Problem:Atrial fibrillation with RVR    Subjective:     HPI: Noted    Interval History: Pt was seen and examined. Labs and meds reviewed. Discussed with other providers. No new c/o's, no new issues.    Review of patient's allergies indicates:  No Known Allergies  Current Facility-Administered Medications   Medication Frequency    acetaminophen tablet 650 mg Q6H PRN    amiodarone tablet 200 mg Daily    arformoteroL nebulizer solution 15 mcg BID    aspirin EC tablet 81 mg Daily    atorvastatin tablet 80 mg QHS    budesonide nebulizer solution 0.5 mg Q12H    empagliflozin (Jardiance) tablet 10 mg Daily    famotidine tablet 20 mg Daily    influenza (adjuvanted) (Fluad) 45 mcg/0.5 mL IM vaccine (> or = 66 yo) 0.5 mL Prior to discharge    losartan split tablet 12.5 mg Daily    metoprolol succinate (TOPROL-XL) 24 hr tablet 25 mg Daily    ondansetron injection 8 mg Q6H PRN    pneumoc 20-haylee conj-dip cr(PF) (PREVNAR-20 (PF)) injection Syrg 0.5 mL vaccine x 1 dose    rivaroxaban tablet 20 mg Daily with dinner    senna-docusate 8.6-50 mg per tablet 1 tablet BID       Objective:     Vital Signs (Most Recent):  Temp: 98.2 °F (36.8 °C) (11/05/24 0742)  Pulse: 67 (11/05/24 0813)  Resp: 18 (11/05/24 0813)  BP: 118/74 (11/05/24 0742)  SpO2: 97 % (11/05/24 0813) Vital Signs (24h Range):  Temp:  [98.2 °F (36.8 °C)-99.1 °F (37.3 °C)] 98.2 °F (36.8 °C)  Pulse:  [] 67  Resp:  [16-19] 18  SpO2:  [94 %-98 %] 97 %  BP: (110-125)/(61-75) 118/74     Weight: 91.2 kg (201 lb 1 oz) (11/04/24 2319)  Body mass index is 29.69 kg/m².  Body surface area is 2.11 meters squared.    I/O last 3 completed shifts:  In: 44.6 [I.V.:44.6]  Out: -      Physical Exam  Vitals and nursing note  reviewed.   Constitutional:       Appearance: Normal appearance.   Cardiovascular:      Rate and Rhythm: Normal rate and regular rhythm.      Pulses: Normal pulses.      Heart sounds: Normal heart sounds.   Pulmonary:      Effort: Pulmonary effort is normal.      Breath sounds: Normal breath sounds.   Abdominal:      Palpations: Abdomen is soft.      Tenderness: There is no abdominal tenderness.   Musculoskeletal:      Right lower leg: No edema.      Left lower leg: No edema.   Neurological:      Mental Status: He is alert.          Significant Labs: reviewed  BMP  Lab Results   Component Value Date     11/05/2024    K 5.2 (H) 11/05/2024     11/05/2024    CO2 21 (L) 11/05/2024    BUN 28 (H) 11/05/2024    CREATININE 1.5 (H) 11/05/2024    CALCIUM 8.7 11/05/2024    ANIONGAP 9 11/05/2024    EGFRNORACEVR 51 (A) 11/05/2024       Significant Imaging: reviewed      Assessment/Plan:     69 y/o male with CARINA after presenting with AF c RBR:     CARINA (acute kidney injury)  S Cr further improved. CARINA resolving  Baseline Cr is normal  Prerenal azotemia, secondary to AF, decreased renal perfusion.  K is normal  Na normal  Ca normal  Acid base, mild metabolic acidosis     HTN: BP is low, but stable  Pt is not on any major BP meds     Atrial fibrillation with RVR  Cardiac issues reviewed.  Presented with AF c RVR  Diastolic CHF, CAD     Management of AF reviewed  S/p amiodarone drip, followed by PO  Heparin drip  On beta blockers  On xarelto     Plans and recommendations:  As discussed above  Total time spent 40 minutes including time needed to review the records, the   patient evaluation, documentation, face-to-face discussion with the patient,   more than 50% of the time was spent on coordination of care and counseling.    Level V visit.        Thank you for your consult.     Lashawn Hamilton MD  Nephrology  [facility] - Telemetry ([facility])

## 2024-11-05 NOTE — PLAN OF CARE
A232/A232 SANTOS Abrams is a 68 y.o.male admitted on 10/29/2024 for Atrial fibrillation with RVR   Code Status: Full Code MRN: [id number]   Review of patient's allergies indicates:  No Known Allergies  Past Medical History:   Diagnosis Date    Arthritis     CHF (congestive heart failure)     COPD (chronic obstructive pulmonary disease)     Hypertension     Liver disease     Stroke       PRN meds    acetaminophen, 650 mg, Q6H PRN  influenza (adjuvanted), 0.5 mL, Prior to discharge  ondansetron, 8 mg, Q6H PRN  pneumoc 20-haylee conj-dip cr(PF), 0.5 mL, vaccine x 1 dose      Chart check completed. Will continue plan of care.      Orientation: disoriented to, time  [place] Coma Scale Score: 14     Lead Monitored: Lead II Rhythm: normal sinus rhythm Frequency/Ectopy: PACs  Cardiac/Telemetry Box Number: [id number]  VTE Core Measure: Pharmacological prophylaxis initiated/maintained Last Bowel Movement: 11/04/24  Diet Renal  Voiding Characteristics: voids spontaneously without difficulty  Chris Score: 17  Fall Risk Score: 17  Accucheck []   Freq?      Lines/Drains/Airways       Peripheral Intravenous Line  Duration                  Peripheral IV - Single Lumen 11/01/24 2224 22 G Anterior;Left Forearm 3 days

## 2024-11-06 LAB
ALBUMIN SERPL BCP-MCNC: 3 G/DL (ref 3.5–5.2)
ALP SERPL-CCNC: 43 U/L (ref 40–150)
ALT SERPL W/O P-5'-P-CCNC: 12 U/L (ref 10–44)
ANION GAP SERPL CALC-SCNC: 7 MMOL/L (ref 8–16)
AST SERPL-CCNC: 21 U/L (ref 10–40)
BASOPHILS # BLD AUTO: 0.01 K/UL (ref 0–0.2)
BASOPHILS NFR BLD: 0.2 % (ref 0–1.9)
BILIRUB SERPL-MCNC: 0.4 MG/DL (ref 0.1–1)
BUN SERPL-MCNC: 28 MG/DL (ref 8–23)
CALCIUM SERPL-MCNC: 8.6 MG/DL (ref 8.7–10.5)
CHLORIDE SERPL-SCNC: 107 MMOL/L (ref 95–110)
CO2 SERPL-SCNC: 21 MMOL/L (ref 23–29)
CREAT SERPL-MCNC: 1.4 MG/DL (ref 0.5–1.4)
DIFFERENTIAL METHOD BLD: ABNORMAL
EOSINOPHIL # BLD AUTO: 0.3 K/UL (ref 0–0.5)
EOSINOPHIL NFR BLD: 4.9 % (ref 0–8)
ERYTHROCYTE [DISTWIDTH] IN BLOOD BY AUTOMATED COUNT: 13.5 % (ref 11.5–14.5)
EST. GFR  (NO RACE VARIABLE): 55 ML/MIN/1.73 M^2
GLUCOSE SERPL-MCNC: 77 MG/DL (ref 70–110)
HCT VFR BLD AUTO: 33.3 % (ref 40–54)
HGB BLD-MCNC: 10.6 G/DL (ref 14–18)
IMM GRANULOCYTES # BLD AUTO: 0.01 K/UL (ref 0–0.04)
IMM GRANULOCYTES NFR BLD AUTO: 0.2 % (ref 0–0.5)
LYMPHOCYTES # BLD AUTO: 0.8 K/UL (ref 1–4.8)
LYMPHOCYTES NFR BLD: 13.6 % (ref 18–48)
MAGNESIUM SERPL-MCNC: 2 MG/DL (ref 1.6–2.6)
MCH RBC QN AUTO: 27.5 PG (ref 27–31)
MCHC RBC AUTO-ENTMCNC: 31.8 G/DL (ref 32–36)
MCV RBC AUTO: 87 FL (ref 82–98)
MONOCYTES # BLD AUTO: 0.5 K/UL (ref 0.3–1)
MONOCYTES NFR BLD: 8.2 % (ref 4–15)
NEUTROPHILS # BLD AUTO: 4.2 K/UL (ref 1.8–7.7)
NEUTROPHILS NFR BLD: 72.9 % (ref 38–73)
NRBC BLD-RTO: 0 /100 WBC
PHOSPHATE SERPL-MCNC: 3.8 MG/DL (ref 2.7–4.5)
PLATELET # BLD AUTO: 140 K/UL (ref 150–450)
PMV BLD AUTO: 9.8 FL (ref 9.2–12.9)
POCT GLUCOSE: 85 MG/DL (ref 70–110)
POCT GLUCOSE: 89 MG/DL (ref 70–110)
POTASSIUM SERPL-SCNC: 5.1 MMOL/L (ref 3.5–5.1)
PROT SERPL-MCNC: 6.3 G/DL (ref 6–8.4)
RBC # BLD AUTO: 3.85 M/UL (ref 4.6–6.2)
SODIUM SERPL-SCNC: 135 MMOL/L (ref 136–145)
WBC # BLD AUTO: 5.75 K/UL (ref 3.9–12.7)

## 2024-11-06 PROCEDURE — 25000003 PHARM REV CODE 250: Performed by: NURSE PRACTITIONER

## 2024-11-06 PROCEDURE — 83735 ASSAY OF MAGNESIUM: CPT | Performed by: NURSE PRACTITIONER

## 2024-11-06 PROCEDURE — 99233 SBSQ HOSP IP/OBS HIGH 50: CPT | Mod: ,,, | Performed by: INTERNAL MEDICINE

## 2024-11-06 PROCEDURE — 85025 COMPLETE CBC W/AUTO DIFF WBC: CPT | Performed by: NURSE PRACTITIONER

## 2024-11-06 PROCEDURE — 80053 COMPREHEN METABOLIC PANEL: CPT | Performed by: NURSE PRACTITIONER

## 2024-11-06 PROCEDURE — 36415 COLL VENOUS BLD VENIPUNCTURE: CPT | Performed by: NURSE PRACTITIONER

## 2024-11-06 PROCEDURE — 94640 AIRWAY INHALATION TREATMENT: CPT

## 2024-11-06 PROCEDURE — 25000003 PHARM REV CODE 250: Performed by: SPECIALIST

## 2024-11-06 PROCEDURE — 25000003 PHARM REV CODE 250: Performed by: STUDENT IN AN ORGANIZED HEALTH CARE EDUCATION/TRAINING PROGRAM

## 2024-11-06 PROCEDURE — 25000242 PHARM REV CODE 250 ALT 637 W/ HCPCS: Performed by: NURSE PRACTITIONER

## 2024-11-06 PROCEDURE — 21400001 HC TELEMETRY ROOM

## 2024-11-06 PROCEDURE — 25000003 PHARM REV CODE 250: Performed by: INTERNAL MEDICINE

## 2024-11-06 PROCEDURE — 25000242 PHARM REV CODE 250 ALT 637 W/ HCPCS: Performed by: INTERNAL MEDICINE

## 2024-11-06 PROCEDURE — 84100 ASSAY OF PHOSPHORUS: CPT | Performed by: NURSE PRACTITIONER

## 2024-11-06 PROCEDURE — 97530 THERAPEUTIC ACTIVITIES: CPT

## 2024-11-06 RX ORDER — FAMOTIDINE 20 MG/1
20 TABLET, FILM COATED ORAL 2 TIMES DAILY
Status: DISCONTINUED | OUTPATIENT
Start: 2024-11-06 | End: 2024-11-12

## 2024-11-06 RX ADMIN — ACETAMINOPHEN 650 MG: 325 TABLET ORAL at 09:11

## 2024-11-06 RX ADMIN — RIVAROXABAN 20 MG: 20 TABLET, FILM COATED ORAL at 05:11

## 2024-11-06 RX ADMIN — ASPIRIN 81 MG: 81 TABLET, COATED ORAL at 09:11

## 2024-11-06 RX ADMIN — EMPAGLIFLOZIN 10 MG: 10 TABLET, FILM COATED ORAL at 09:11

## 2024-11-06 RX ADMIN — BUDESONIDE INHALATION 0.5 MG: 0.5 SUSPENSION RESPIRATORY (INHALATION) at 07:11

## 2024-11-06 RX ADMIN — SENNOSIDES AND DOCUSATE SODIUM 1 TABLET: 50; 8.6 TABLET ORAL at 09:11

## 2024-11-06 RX ADMIN — ARFORMOTEROL TARTRATE 15 MCG: 15 SOLUTION RESPIRATORY (INHALATION) at 07:11

## 2024-11-06 RX ADMIN — FAMOTIDINE 20 MG: 20 TABLET ORAL at 09:11

## 2024-11-06 RX ADMIN — AMIODARONE HYDROCHLORIDE 200 MG: 200 TABLET ORAL at 09:11

## 2024-11-06 RX ADMIN — LOSARTAN POTASSIUM 12.5 MG: 25 TABLET, FILM COATED ORAL at 09:11

## 2024-11-06 RX ADMIN — ATORVASTATIN CALCIUM 80 MG: 40 TABLET, FILM COATED ORAL at 09:11

## 2024-11-06 NOTE — SUBJECTIVE & OBJECTIVE
Interval History: Pt was seen and examined. Labs and meds reviewed. Discussed with other providers. No new events, feels better today. No new c/o's.    Review of patient's allergies indicates:  No Known Allergies  Current Facility-Administered Medications   Medication Frequency    acetaminophen tablet 650 mg Q6H PRN    amiodarone tablet 200 mg Daily    arformoteroL nebulizer solution 15 mcg BID    aspirin EC tablet 81 mg Daily    atorvastatin tablet 80 mg QHS    budesonide nebulizer solution 0.5 mg Q12H    empagliflozin (Jardiance) tablet 10 mg Daily    famotidine tablet 20 mg Daily    influenza (adjuvanted) (Fluad) 45 mcg/0.5 mL IM vaccine (> or = 64 yo) 0.5 mL Prior to discharge    losartan split tablet 12.5 mg Daily    metoprolol succinate (TOPROL-XL) 24 hr tablet 25 mg Daily    ondansetron injection 8 mg Q6H PRN    pneumoc 20-haylee conj-dip cr(PF) (PREVNAR-20 (PF)) injection Syrg 0.5 mL vaccine x 1 dose    rivaroxaban tablet 20 mg Daily with dinner    senna-docusate 8.6-50 mg per tablet 1 tablet BID       Objective:     Vital Signs (Most Recent):  Temp: 98 °F (36.7 °C) (11/06/24 1134)  Pulse: 62 (11/06/24 1134)  Resp: 19 (11/06/24 1134)  BP: 109/61 (11/06/24 1134)  SpO2: 97 % (11/06/24 1134) Vital Signs (24h Range):  Temp:  [98 °F (36.7 °C)-98.8 °F (37.1 °C)] 98 °F (36.7 °C)  Pulse:  [48-88] 62  Resp:  [17-20] 19  SpO2:  [95 %-100 %] 97 %  BP: ()/(52-83) 109/61     Weight: 87.7 kg (193 lb 5.5 oz) (11/05/24 2330)  Body mass index is 28.55 kg/m².  Body surface area is 2.07 meters squared.    No intake/output data recorded.     Physical Exam  Vitals and nursing note reviewed.   Constitutional:       Appearance: Normal appearance.   Cardiovascular:      Rate and Rhythm: Normal rate and regular rhythm.      Heart sounds: Normal heart sounds.   Pulmonary:      Effort: Pulmonary effort is normal.      Breath sounds: Normal breath sounds.   Abdominal:      Palpations: Abdomen is soft.      Tenderness: There is no  abdominal tenderness.   Musculoskeletal:      Right lower leg: No edema.      Left lower leg: No edema.   Neurological:      Mental Status: He is alert.   Psychiatric:         Behavior: Behavior normal.          Significant Labs: reviewed  BMP  Lab Results   Component Value Date     (L) 11/06/2024    K 5.1 11/06/2024     11/06/2024    CO2 21 (L) 11/06/2024    BUN 28 (H) 11/06/2024    CREATININE 1.4 11/06/2024    CALCIUM 8.6 (L) 11/06/2024    ANIONGAP 7 (L) 11/06/2024    EGFRNORACEVR 55 (A) 11/06/2024     Lab Results   Component Value Date    WBC 5.75 11/06/2024    HGB 10.6 (L) 11/06/2024    HCT 33.3 (L) 11/06/2024    MCV 87 11/06/2024     (L) 11/06/2024         Significant Imaging: reviewed

## 2024-11-06 NOTE — ASSESSMENT & PLAN NOTE
Hyperkalemia is likely due to CARINA.The patients most recent potassium results are listed below.  Recent Labs     11/03/24  0556 11/04/24  0522 11/05/24  0432   K 5.1 5.1 5.2*       Plan  - Monitor for arrhythmias with EKG and/or continuous telemetry.

## 2024-11-06 NOTE — ASSESSMENT & PLAN NOTE
Patient has paroxysmal (<7 days) atrial fibrillation. Patient is currently in sinus rhythm. [id number] Score: 1. The patients heart rate in the last 24 hours is as follows:  Pulse  Min: 48  Max: 104     Antiarrhythmics  amiodarone tablet 200 mg, Daily, Oral  metoprolol succinate (TOPROL-XL) 24 hr tablet 25 mg, Daily, Oral    Anticoagulants  rivaroxaban tablet 20 mg, With dinner, Oral    Plan  - Replete lytes with a goal of K>4, Mg >2  - Patient is anticoagulated, see medications listed above.  - Patient's afib is currently controlled  - cardiology following

## 2024-11-06 NOTE — PLAN OF CARE
"Pt up in chair at start of therapy. Pt declined OOB activity reporting "I'm just too tired. No."  Pt educated on, and encouraged to perform BUE AROM exercises for BUE's to increase functional strength needed for daily activities. Pt verbalized understanding. Pt encouraged to continue with sitting up in chair to prevent pneumonia and functional endurance. Pt verbalized understanding.    Recommend moderate intensity therapy at DC  "

## 2024-11-06 NOTE — PLAN OF CARE
JANENE reached out to patient's daughter, Nasreen, regarding SNF placement.   Patient's daughter stated they wished for placement in the [place]/ [place] area. SW stated she has not heard from [facility]. SW stated she could reach out to [facility] and keep asking Mone for an answer.   Patient's daughter verbalized understanding.     11 33 Per Juanita, with [facility], they are not in network with patient's insurance and are unable to accept Patient.  JANENE sent messaged to Maldonado, with [facility] to review referral. Pending response.     12 46 SW reached out to patient's daughter, Nasreen, regarding SNF placement. SW stated [facility] does not have a bed available until Friday and we would need a facility to submit for insurance authorization today. Patient inquired about if [facility] could submit for authorization for anticipated discharge Friday. SW explained that the facilities do not submit for insurance authorization without a bed available.   Patient's daughter stated she would have to contact her cousin and grandfather (Patient's father) to see where they would want him placed in . Patient's daughter was trying to get him placed closer to where they live in [place]. Patient's daughter requested SW call back in about an hour, to give her time to speak with family. SW stated she would call back around 2 pm for SNF decision.     13 20 JANENE reached out to [facility], and spoke to Lou, regarding patient's referral. Per Lou, they had not received the referral and inquired if SW could fax referral. JANENE confirmed fax number [phone]. JANENE sent referral via fax for review. JANENE inquired if they could submit for authorization today, for anticipated DC Friday. Lou stated they could submit today if they could accept Patient for admission Friday. JANENE verbalized understanding.     14 54 Per Lou, with [facility], they did not receive the referral for patient for review. Lou stated  JANENE could refax information and SW inquired if she could email SNF referral. Lou provided email [email or web address] emailed referral for review.     15 15 JANENE reached out to Patient's daughter, Nasreen, regarding SNF placement. SW stated that [facility] had not initially received the referral and SW was able to email the referral for their nursing department to review. SW also stated there is another SNF in [facility], that she could send referral for them to review. Patient's daughter verbalized understanding regarding [facility] and [facility].  Patient's daughter stated that she is trying to get as close as possible to [place]/ [facility]. Patient's daughter stated that that [place]  and [facility] would be the preferred for placement in [place] if [facility] and [facility] could not accept. Patient's daughter verbalized understanding.     16 05 JANENE reached out to Lou, with [facility] regarding SNF referral. Per Lou, they did receive the referral and their nursing department has the referral and is reviewing. JANENE stated family wished for him to be closer to home and stated she would touch base in the morning to see if they could accept.   SW reached out to [facility], to make sure they got the referral. Per  at [facility], they did receive referral via Fax. JANENE stated she would follow up in the morning.     JANENE will continue to follow and assist as needed.

## 2024-11-06 NOTE — ASSESSMENT & PLAN NOTE
67 y/o male with CARINA after presenting with AF c RBR:     CARINA (acute kidney injury)  S Cr further improved. CARINA resolving  Baseline Cr is normal  Prerenal azotemia, secondary to AF, decreased renal perfusion.  K is normal  Na normal  Ca normal  Acid base, mild metabolic acidosis     HTN: BP is low, but stable  Pt is not on any major BP meds     Atrial fibrillation with RVR  Cardiac issues reviewed.  Presented with AF c RVR  Diastolic CHF, CAD     Management of AF reviewed  S/p amiodarone drip, followed by PO  Heparin drip  On beta blockers  On xarelto     Plans and recommendations:  As discussed above  Total time spent 40 minutes including time needed to review the records, the   patient evaluation, documentation, face-to-face discussion with the patient,   more than 50% of the time was spent on coordination of care and counseling.    Level V visit.

## 2024-11-06 NOTE — PROGRESS NOTES
[facility] Medicine  Progress Note    Patient Name: Layton Abrams  MRN: [id number]  Patient Class: IP- Inpatient   Admission Date: 10/29/2024  Length of Stay: 7 days  Attending Physician: David Gunderson DO  Primary Care Provider: Nellie Cadena MD        Subjective:     Principal Problem:Atrial fibrillation with RVR        HPI:  The patient is 68-year-old male with a history of atrial fibrillation, coronary artery disease with stenting, hypertension, stroke with right-sided deficits, COPD, HCV, left peroneal DVT, Xarelto use, and lower extremity cellulitis presented to [facility] Emergency Department on October 29 with weakness that began on the morning of presentation.  By report he had been feeling well the day prior but did not feel well on October 29.  In the emergency department he had AFib with RVR and hypotension, but oxygenation was stable on room air.  Patient was drowsy but would wake and speak without difficulty.  He reported no chest pain or shortness of breath.  Labs were concerning for CARINA with  and creatinine 5.38.  Potassium was 7.  High sensitivity troponin was in the normal range at 40.  Chest x-ray had clear lungs but did show marked cardiomegaly.  ED attempted cardioversion with the AFib with RVR, but the patient did not cardiovert.  By report, EKGs did not show evidence of significant peak T-waves. He received calcium, dextrose/insulin, lactated Ringer's, IV metoprolol, IV bicarbonate, and Lokelma.  Levophed infusion was initiated.  Blood pressure improved with the Levophed, but tachycardia persisted.  Patient has a PICC line in place.  He also had a Wilcox catheter placed and had a small amount of urine with catheter placement.  Case discussed with the ED doctor and with Critical Care Medicine at [facility].  Patient transfer to [facility] and admitted to the ICU. He was placed on bicarb and amiodarone infusions.         Overview/Hospital Course:  The patient presented with weakness that began on the morning of presentation. By report he had been feeling well the day prior but did not feel well on October 29. In the emergency department he had AFib with RVR and hypotension, but oxygenation was stable on room air. Patient was drowsy but would wake and speak without difficulty. He reported no chest pain or shortness of breath. Labs were concerning for CARINA with  and creatinine 5.38. Potassium was 7. High sensitivity troponin was in the normal range at 40. Chest x-ray had clear lungs but did show marked cardiomegaly. ED attempted cardioversion with the AFib with RVR, but the patient did not cardiovert. By report, EKGs did not show evidence of significant peak T-waves. He received calcium, dextrose/insulin, lactated Ringer's, IV metoprolol, IV bicarbonate, and Lokelma. Levophed infusion was initiated. Blood pressure improved with the Levophed, but tachycardia persisted. Patient has a PICC line in place. He also had a Wilcox catheter placed and had a small amount of urine with catheter placement. Case discussed with the ED doctor and with Critical Care Medicine at [facility]. Patient transfer to [facility] and admitted to the ICU. He was placed on bicarb and amiodarone infusions. He was weaned off of levophed.  His renal function improved. Arterial US revealed PAD and occluded sfa. Vascular surgery was consulted, and evaluation noted plans for outpatient follow up with left leg angiogram for possible intervention and right leg intervention in the near future as well.  Plan to follow up in 2-3 weeks after medical stabilization.  Pending SNF placement    Interval History: . No acute events overnight, afebrile, hemodynamically stable.  Denies any acute concerns    Objective:     Vital Signs (Most Recent):  Temp: 98.4 °F (36.9 °C) (11/05/24 1932)  Pulse: (!) 55 (11/05/24 1932)  Resp: 17 (11/05/24 1932)  BP: (!)  92/52 (11/05/24 1932)  SpO2: 99 % (11/05/24 1932) Vital Signs (24h Range):  Temp:  [98.2 °F (36.8 °C)-99.1 °F (37.3 °C)] 98.4 °F (36.9 °C)  Pulse:  [] 55  Resp:  [17-19] 17  SpO2:  [93 %-100 %] 99 %  BP: ()/(52-75) 92/52     Weight: 91.2 kg (201 lb 1 oz)  Body mass index is 29.69 kg/m².  No intake or output data in the 24 hours ending 11/05/24 2015        Physical Exam  Vitals and nursing note reviewed.   Constitutional:       General: He is not in acute distress.     Appearance: He is ill-appearing. He is not toxic-appearing or diaphoretic.   HENT:      Head: Normocephalic and atraumatic.      Mouth/Throat:      Mouth: Mucous membranes are moist.   Eyes:      General: No scleral icterus.        Right eye: No discharge.         Left eye: No discharge.   Cardiovascular:      Rate and Rhythm: Normal rate and regular rhythm.      Heart sounds: Normal heart sounds.   Pulmonary:      Effort: Pulmonary effort is normal. No respiratory distress.   Abdominal:      General: Bowel sounds are normal.      Tenderness: There is no abdominal tenderness.   Musculoskeletal:      Cervical back: No rigidity.      Right lower leg: No edema.      Left lower leg: No edema.   Skin:     General: Skin is warm and dry.      Coloration: Skin is not jaundiced.   Neurological:      Mental Status: He is alert and oriented to person, place, and time. Mental status is at baseline.   Psychiatric:         Mood and Affect: Mood normal.         Behavior: Behavior normal.             Significant Labs: All pertinent labs within the past 24 hours have been reviewed.  LABS:  Recent Labs   Lab 11/03/24  0556 11/04/24 0522 11/05/24  0432    136 136   K 5.1 5.1 5.2*    109 106   CO2 25 20* 21*   BUN 34* 31* 28*   CREATININE 1.7* 1.6* 1.5*   GLU 79 81 79   ANIONGAP 5* 7* 9     Recent Labs   Lab 11/03/24  0556 11/04/24 0522 11/05/24  0432   MG 1.8 1.8 2.0   PHOS 3.2 3.2 3.5     Recent Labs   Lab 10/30/24  0545 10/31/24  0436  11/01/24  0420 11/03/24  0556 11/04/24 0522 11/05/24  0432   AST 20 16   < > 22 22 22   ALT 13 8*   < > 11 12 14   ALKPHOS 48 32*   < > 38* 41 43   BILITOT 0.3 0.2   < > 0.4 0.5 0.5   BILIDIR 0.1 <0.1*  --   --   --   --    ALBUMIN 3.7 2.7*  2.7*   < > 3.0* 3.1* 3.2*    < > = values in this interval not displayed.     POCT Glucose:   Recent Labs   Lab 10/29/24  2357 10/30/24  0057 11/05/24  1144   POCTGLUCOSE 186* 155* 94    Recent Labs   Lab 11/03/24  0556 11/04/24 0522 11/05/24 0432   WBC 5.64 7.69 7.61   HGB 10.3* 10.7* 10.6*   HCT 32.8* 34.4* 34.0*   * 118* 131*   GRAN 71.0  4.0 74.3*  5.7 76.8*  5.9        Micro  Blood Cultures  Lab Results   Component Value Date    LABBLOO No growth after 5 days. 10/30/2024    LABBLOO No growth after 5 days. 10/30/2024       Significant Imaging: I have reviewed all pertinent imaging results/findings within the past 24 hours.  US Lower Extrem Arteries Bilat with GEOVANNY (xpd)   Final Result      1.  Markedly abnormal study.  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  There is monophasic flow involving all vessels imaged with marked plaque formation diffusely.  There are low velocities involving the bilateral popliteal arteries and trifurcation vessels.      2.  There are elevated velocities involving both profunda femoral arteries, consistent with significant stenosis.      3.  The bilateral ABIs are unable to be measured due to noncompliance ankle vessels.         Electronically signed by: Bentley Dumont MD   Date:    11/02/2024   Time:    18:56      US Kidney   Final Result      No hydronephrosis         Electronically signed by: Umair Grant MD   Date:    10/30/2024   Time:    11:06      X-Ray Chest AP Portable   Final Result      Right-sided PICC is satisfactory         Electronically signed by: Robin Sanchez   Date:    10/29/2024   Time:    22:46          Inpatient Medications:  Continuous Infusions:  Scheduled Meds:    "amiodarone  200 mg Oral Daily    arformoteroL  15 mcg Nebulization BID    aspirin  81 mg Oral Daily    atorvastatin  80 mg Oral QHS    budesonide  0.5 mg Nebulization Q12H    empagliflozin  10 mg Oral Daily    famotidine  20 mg Oral Daily    losartan  12.5 mg Oral Daily    metoprolol succinate  25 mg Oral Daily    rivaroxaban  20 mg Oral Daily with dinner    senna-docusate 8.6-50 mg  1 tablet Oral BID     PRN Meds:  Current Facility-Administered Medications:     acetaminophen, 650 mg, Oral, Q6H PRN    influenza (adjuvanted), 0.5 mL, Intramuscular, Prior to discharge    ondansetron, 8 mg, Intravenous, Q6H PRN    pneumoc 20-haylee conj-dip cr(PF), 0.5 mL, Intramuscular, vaccine x 1 dose      Assessment/Plan:      * Atrial fibrillation with RVR  Patient has paroxysmal (<7 days) atrial fibrillation. Patient is currently in sinus rhythm. [id number] Score: 1. The patients heart rate in the last 24 hours is as follows:  Pulse  Min: 48  Max: 104     Antiarrhythmics  amiodarone tablet 200 mg, Daily, Oral  metoprolol succinate (TOPROL-XL) 24 hr tablet 25 mg, Daily, Oral    Anticoagulants  rivaroxaban tablet 20 mg, With dinner, Oral    Plan  - Replete lytes with a goal of K>4, Mg >2  - Patient is anticoagulated, see medications listed above.  - Patient's afib is currently controlled  - cardiology following        PAD (peripheral artery disease)  Lower extremity altered ultrasound noted "occlusion of the left mid and distal superficial femoral arteries "    -Vascular surgery was consulted, and evaluation noted plans for outpatient follow up with left leg angiogram for possible intervention and right leg intervention in the near future as well.  Plan to follow up in 2-3 weeks after medical stabilization.      -continue aspirin    HFrEF (heart failure with reduced ejection fraction)  Patient has Systolic (HFrEF) heart failure that is Acute on chronic. On presentation their CHF was decompensated. Evidence of decompensated CHF on " "presentation includes: elevated JVD and shortness of breath. The etiology of their decompensation is likely atrial fibrillation . Most recent BNP and echo results are listed below.  No results for input(s): "BNP" in the last 72 hours.    Latest ECHO  Results for orders placed during the hospital encounter of 10/29/24    Echo    Interpretation Summary    Left Ventricle: The left ventricle is normal in size. Ventricular mass is normal. Mildly increased wall thickness. There is concentric remodeling. There is normal systolic function with a visually estimated ejection fraction of 60 - 65%. Unable to assess diastolic function due to atrial fibrillation.    Right Ventricle: Severe right ventricular enlargement. Wall thickness is normal. Systolic function is moderately reduced.    Right Atrium: Right atrium is severely dilated.    Tricuspid Valve: There is severe regurgitation.    Pulmonic Valve: There is mild regurgitation.    Pulmonary Artery: There is moderate pulmonary hypertension. The estimated pulmonary artery systolic pressure is 59 mmHg.    IVC/SVC: Elevated venous pressure at 15 mmHg.    Current Heart Failure Medications  metoprolol succinate (TOPROL-XL) 24 hr tablet 25 mg, Daily, Oral  empagliflozin (Jardiance) tablet 10 mg, Daily, Oral  losartan split tablet 12.5 mg, Daily, Oral    Plan  - Monitor strict I&Os and daily weights.    - Place on telemetry  - Low sodium diet  - Cardiology has been consulted  - The patient's volume status is improving but not at their baseline as indicated by elevated JVD            Panlobular emphysema  Patient's COPD is controlled currently.  Patient is currently off COPD Pathway. Continue scheduled inhalers Supplemental oxygen and monitor respiratory status closely.     Hyperkalemia  Hyperkalemia is likely due to CARINA.The patients most recent potassium results are listed below.  Recent Labs     11/03/24  0556 11/04/24  0522 11/05/24  0432   K 5.1 5.1 5.2*       Plan  - Monitor for " arrhythmias with EKG and/or continuous telemetry.               CARINA (acute kidney injury)  CARINA is likely due to  multiple factors, HF, afib, hypotension . Baseline creatinine is  0.7-1 . Most recent creatinine and eGFR are listed below.  Recent Labs     11/03/24  0556 11/04/24  0522 11/05/24  0432   CREATININE 1.7* 1.6* 1.5*   EGFRNORACEVR 44* 47* 51*        Plan  - CARINA is improving  - Avoid nephrotoxins and renally dose meds for GFR listed above  - Monitor urine output, serial BMP, and adjust therapy as needed  - appreciate nephrology      VTE Risk Mitigation (From admission, onward)           Ordered     rivaroxaban tablet 20 mg  With dinner         11/05/24 1000                    Discharge Planning   WIL:      Code Status: Full Code   Is the patient medically ready for discharge?:     Reason for patient still in hospital (select all that apply): Pending disposition  Discharge Plan A: Skilled Nursing Facility   Discharge Delays: None known at this time        David Gunderson DO  Department of Hospital Medicine   [facility] ([facility])    Voice recognition software was used in the creation of this note/communication and any sound-alike/typographical errors which may have occurred despite initial review prior to signing should be taken in context when interpreting.  If such errors prevent a clear understanding of the note/communication, please contact the provider/office for clarification.

## 2024-11-06 NOTE — PROGRESS NOTES
[facility] ([facility])  Nephrology  Progress Note    Patient Name: Layton Abrams  MRN: [id number]  Admission Date: 10/29/2024  Hospital Length of Stay: 8 days  Attending Provider: David Gunderson DO   Primary Care Physician: Nellie Cadena MD  Principal Problem:Atrial fibrillation with RVR    Subjective:     HPI: reviewed    Interval History: Pt was seen and examined. Labs and meds reviewed. Discussed with other providers. No new events, feels better today. No new c/o's.    Review of patient's allergies indicates:  No Known Allergies  Current Facility-Administered Medications   Medication Frequency    acetaminophen tablet 650 mg Q6H PRN    amiodarone tablet 200 mg Daily    arformoteroL nebulizer solution 15 mcg BID    aspirin EC tablet 81 mg Daily    atorvastatin tablet 80 mg QHS    budesonide nebulizer solution 0.5 mg Q12H    empagliflozin (Jardiance) tablet 10 mg Daily    famotidine tablet 20 mg Daily    influenza (adjuvanted) (Fluad) 45 mcg/0.5 mL IM vaccine (> or = 64 yo) 0.5 mL Prior to discharge    losartan split tablet 12.5 mg Daily    metoprolol succinate (TOPROL-XL) 24 hr tablet 25 mg Daily    ondansetron injection 8 mg Q6H PRN    pneumoc 20-haylee conj-dip cr(PF) (PREVNAR-20 (PF)) injection Syrg 0.5 mL vaccine x 1 dose    rivaroxaban tablet 20 mg Daily with dinner    senna-docusate 8.6-50 mg per tablet 1 tablet BID       Objective:     Vital Signs (Most Recent):  Temp: 98 °F (36.7 °C) (11/06/24 1134)  Pulse: 62 (11/06/24 1134)  Resp: 19 (11/06/24 1134)  BP: 109/61 (11/06/24 1134)  SpO2: 97 % (11/06/24 1134) Vital Signs (24h Range):  Temp:  [98 °F (36.7 °C)-98.8 °F (37.1 °C)] 98 °F (36.7 °C)  Pulse:  [48-88] 62  Resp:  [17-20] 19  SpO2:  [95 %-100 %] 97 %  BP: ()/(52-83) 109/61     Weight: 87.7 kg (193 lb 5.5 oz) (11/05/24 2330)  Body mass index is 28.55 kg/m².  Body surface area is 2.07 meters squared.    No intake/output data recorded.     Physical Exam  Vitals and nursing note reviewed.    Constitutional:       Appearance: Normal appearance.   Cardiovascular:      Rate and Rhythm: Normal rate and regular rhythm.      Heart sounds: Normal heart sounds.   Pulmonary:      Effort: Pulmonary effort is normal.      Breath sounds: Normal breath sounds.   Abdominal:      Palpations: Abdomen is soft.      Tenderness: There is no abdominal tenderness.   Musculoskeletal:      Right lower leg: No edema.      Left lower leg: No edema.   Neurological:      Mental Status: He is alert.   Psychiatric:         Behavior: Behavior normal.          Significant Labs: reviewed  BMP  Lab Results   Component Value Date     (L) 11/06/2024    K 5.1 11/06/2024     11/06/2024    CO2 21 (L) 11/06/2024    BUN 28 (H) 11/06/2024    CREATININE 1.4 11/06/2024    CALCIUM 8.6 (L) 11/06/2024    ANIONGAP 7 (L) 11/06/2024    EGFRNORACEVR 55 (A) 11/06/2024     Lab Results   Component Value Date    WBC 5.75 11/06/2024    HGB 10.6 (L) 11/06/2024    HCT 33.3 (L) 11/06/2024    MCV 87 11/06/2024     (L) 11/06/2024         Significant Imaging: reviewed    Assessment/Plan:     69 y/o male with CARINA after presenting with AF c RBR:     CARINA (acute kidney injury)  S Cr has improved, close to prior baseline. CARINA has resolved.  Baseline Cr is normal  Prerenal azotemia, secondary to AF, decreased renal perfusion.  K is normal  Na normal  Ca normal  Acid base, mild metabolic acidosis     HTN: BP is low, but stable  Pt is not on any major BP meds     Atrial fibrillation with RVR  Presented with AF c RVR  Diastolic CHF, CAD     Management of AF reviewed  S/p amiodarone drip, followed by PO  Heparin drip  On beta blockers  On xarelto     Plans and recommendations:  As discussed above  Total time spent 40 minutes including time needed to review the records, the   patient evaluation, documentation, face-to-face discussion with the patient,   more than 50% of the time was spent on coordination of care and counseling.    Level V  visit.        Thank you for your consult.     Lashawn Hamilton MD  Nephrology  [facility] ([facility])

## 2024-11-06 NOTE — PROGRESS NOTES
Pharmacist Renal Dose Adjustment Note    Layton Abrams is a 68 y.o. male being treated with the medication Famotidine    Patient Data:    Vital Signs (Most Recent):  Temp: 98 °F (36.7 °C) (11/06/24 1134)  Pulse: 60 (11/06/24 1400)  Resp: 19 (11/06/24 1134)  BP: 109/61 (11/06/24 1134)  SpO2: 97 % (11/06/24 1134) Vital Signs (72h Range):  Temp:  [97.9 °F (36.6 °C)-99.7 °F (37.6 °C)]   Pulse:  []   Resp:  [16-20]   BP: ()/(52-91)   SpO2:  [93 %-100 %]      Recent Labs   Lab 11/04/24  0522 11/05/24  0432 11/06/24  0529   CREATININE 1.6* 1.5* 1.4     Serum creatinine: 1.4 mg/dL 11/06/24 0529  Estimated creatinine clearance: 56.1 mL/min    Medication:Famotidine dose: 20 mg frequency every 24 hours will be changed to medication:Famotidine dose:20 mg frequency:every 12 hours    Pharmacist's Name: Leonardo Glover  Pharmacist's Extension: [phone]

## 2024-11-06 NOTE — ASSESSMENT & PLAN NOTE
"Lower extremity altered ultrasound noted "occlusion of the left mid and distal superficial femoral arteries "    -Vascular surgery was consulted, and evaluation noted plans for outpatient follow up with left leg angiogram for possible intervention and right leg intervention in the near future as well.  Plan to follow up in 2-3 weeks after medical stabilization.      -continue aspirin  "

## 2024-11-06 NOTE — PT/OT/SLP PROGRESS
Physical Therapy      Patient Name:  Layton Abrams   MRN:  [id number]    Chart review performed and attempted but patient not seen today secondary to Patient unwilling to participate.

## 2024-11-06 NOTE — SUBJECTIVE & OBJECTIVE
Interval History: . No acute events overnight, afebrile, hemodynamically stable.  Denies any acute concerns    Objective:     Vital Signs (Most Recent):  Temp: 98.4 °F (36.9 °C) (11/05/24 1932)  Pulse: (!) 55 (11/05/24 1932)  Resp: 17 (11/05/24 1932)  BP: (!) 92/52 (11/05/24 1932)  SpO2: 99 % (11/05/24 1932) Vital Signs (24h Range):  Temp:  [98.2 °F (36.8 °C)-99.1 °F (37.3 °C)] 98.4 °F (36.9 °C)  Pulse:  [] 55  Resp:  [17-19] 17  SpO2:  [93 %-100 %] 99 %  BP: ()/(52-75) 92/52     Weight: 91.2 kg (201 lb 1 oz)  Body mass index is 29.69 kg/m².  No intake or output data in the 24 hours ending 11/05/24 2015        Physical Exam  Vitals and nursing note reviewed.   Constitutional:       General: He is not in acute distress.     Appearance: He is ill-appearing. He is not toxic-appearing or diaphoretic.   HENT:      Head: Normocephalic and atraumatic.      Mouth/Throat:      Mouth: Mucous membranes are moist.   Eyes:      General: No scleral icterus.        Right eye: No discharge.         Left eye: No discharge.   Cardiovascular:      Rate and Rhythm: Normal rate and regular rhythm.      Heart sounds: Normal heart sounds.   Pulmonary:      Effort: Pulmonary effort is normal. No respiratory distress.   Abdominal:      General: Bowel sounds are normal.      Tenderness: There is no abdominal tenderness.   Musculoskeletal:      Cervical back: No rigidity.      Right lower leg: No edema.      Left lower leg: No edema.   Skin:     General: Skin is warm and dry.      Coloration: Skin is not jaundiced.   Neurological:      Mental Status: He is alert and oriented to person, place, and time. Mental status is at baseline.   Psychiatric:         Mood and Affect: Mood normal.         Behavior: Behavior normal.             Significant Labs: All pertinent labs within the past 24 hours have been reviewed.  LABS:  Recent Labs   Lab 11/03/24  0556 11/04/24  0522 11/05/24  0432    136 136   K 5.1 5.1 5.2*    109 106    CO2 25 20* 21*   BUN 34* 31* 28*   CREATININE 1.7* 1.6* 1.5*   GLU 79 81 79   ANIONGAP 5* 7* 9     Recent Labs   Lab 11/03/24  0556 11/04/24  0522 11/05/24  0432   MG 1.8 1.8 2.0   PHOS 3.2 3.2 3.5     Recent Labs   Lab 10/30/24  0545 10/31/24  0436 11/01/24  0420 11/03/24  0556 11/04/24  0522 11/05/24  0432   AST 20 16   < > 22 22 22   ALT 13 8*   < > 11 12 14   ALKPHOS 48 32*   < > 38* 41 43   BILITOT 0.3 0.2   < > 0.4 0.5 0.5   BILIDIR 0.1 <0.1*  --   --   --   --    ALBUMIN 3.7 2.7*  2.7*   < > 3.0* 3.1* 3.2*    < > = values in this interval not displayed.     POCT Glucose:   Recent Labs   Lab 10/29/24  2357 10/30/24  0057 11/05/24  1144   POCTGLUCOSE 186* 155* 94    Recent Labs   Lab 11/03/24  0556 11/04/24  0522 11/05/24  0432   WBC 5.64 7.69 7.61   HGB 10.3* 10.7* 10.6*   HCT 32.8* 34.4* 34.0*   * 118* 131*   GRAN 71.0  4.0 74.3*  5.7 76.8*  5.9        Micro  Blood Cultures  Lab Results   Component Value Date    LABBLOO No growth after 5 days. 10/30/2024    LABBLOO No growth after 5 days. 10/30/2024       Significant Imaging: I have reviewed all pertinent imaging results/findings within the past 24 hours.  US Lower Extrem Arteries Bilat with GEOVANNY (xpd)   Final Result      1.  Markedly abnormal study.  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  There is monophasic flow involving all vessels imaged with marked plaque formation diffusely.  There are low velocities involving the bilateral popliteal arteries and trifurcation vessels.      2.  There are elevated velocities involving both profunda femoral arteries, consistent with significant stenosis.      3.  The bilateral ABIs are unable to be measured due to noncompliance ankle vessels.         Electronically signed by: Bentley Dumont MD   Date:    11/02/2024   Time:    18:56      US Kidney   Final Result      No hydronephrosis         Electronically signed by: Umair Grant MD   Date:    10/30/2024   Time:    11:06       X-Ray Chest AP Portable   Final Result      Right-sided PICC is satisfactory         Electronically signed by: Robin Sanchez   Date:    10/29/2024   Time:    22:46          Inpatient Medications:  Continuous Infusions:  Scheduled Meds:   amiodarone  200 mg Oral Daily    arformoteroL  15 mcg Nebulization BID    aspirin  81 mg Oral Daily    atorvastatin  80 mg Oral QHS    budesonide  0.5 mg Nebulization Q12H    empagliflozin  10 mg Oral Daily    famotidine  20 mg Oral Daily    losartan  12.5 mg Oral Daily    metoprolol succinate  25 mg Oral Daily    rivaroxaban  20 mg Oral Daily with dinner    senna-docusate 8.6-50 mg  1 tablet Oral BID     PRN Meds:  Current Facility-Administered Medications:     acetaminophen, 650 mg, Oral, Q6H PRN    influenza (adjuvanted), 0.5 mL, Intramuscular, Prior to discharge    ondansetron, 8 mg, Intravenous, Q6H PRN    pneumoc 20-haylee conj-dip cr(PF), 0.5 mL, Intramuscular, vaccine x 1 dose

## 2024-11-06 NOTE — ASSESSMENT & PLAN NOTE
CARINA is likely due to  multiple factors, HF, afib, hypotension . Baseline creatinine is  0.7-1 . Most recent creatinine and eGFR are listed below.  Recent Labs     11/03/24  0556 11/04/24  0522 11/05/24  0432   CREATININE 1.7* 1.6* 1.5*   EGFRNORACEVR 44* 47* 51*        Plan  - CARINA is improving  - Avoid nephrotoxins and renally dose meds for GFR listed above  - Monitor urine output, serial BMP, and adjust therapy as needed  - appreciate nephrology

## 2024-11-06 NOTE — ASSESSMENT & PLAN NOTE
"Patient has Systolic (HFrEF) heart failure that is Acute on chronic. On presentation their CHF was decompensated. Evidence of decompensated CHF on presentation includes: elevated JVD and shortness of breath. The etiology of their decompensation is likely atrial fibrillation . Most recent BNP and echo results are listed below.  No results for input(s): "BNP" in the last 72 hours.    Latest ECHO  Results for orders placed during the hospital encounter of 10/29/24    Echo    Interpretation Summary    Left Ventricle: The left ventricle is normal in size. Ventricular mass is normal. Mildly increased wall thickness. There is concentric remodeling. There is normal systolic function with a visually estimated ejection fraction of 60 - 65%. Unable to assess diastolic function due to atrial fibrillation.    Right Ventricle: Severe right ventricular enlargement. Wall thickness is normal. Systolic function is moderately reduced.    Right Atrium: Right atrium is severely dilated.    Tricuspid Valve: There is severe regurgitation.    Pulmonic Valve: There is mild regurgitation.    Pulmonary Artery: There is moderate pulmonary hypertension. The estimated pulmonary artery systolic pressure is 59 mmHg.    IVC/SVC: Elevated venous pressure at 15 mmHg.    Current Heart Failure Medications  metoprolol succinate (TOPROL-XL) 24 hr tablet 25 mg, Daily, Oral  empagliflozin (Jardiance) tablet 10 mg, Daily, Oral  losartan split tablet 12.5 mg, Daily, Oral    Plan  - Monitor strict I&Os and daily weights.    - Place on telemetry  - Low sodium diet  - Cardiology has been consulted  - The patient's volume status is improving but not at their baseline as indicated by elevated JVD          "

## 2024-11-06 NOTE — PT/OT/SLP PROGRESS
"Occupational Therapy   Treatment    Name: Layton Abrams  MRN: [id number]  Admitting Diagnosis:  Atrial fibrillation with RVR       Recommendations:     Discharge Recommendations: Moderate Intensity Therapy  Discharge Equipment Recommendations:  to be determined by next level of care  Barriers to discharge:  Decreased caregiver support    Assessment:     Layton Abrams is a 68 y.o. male with a medical diagnosis of Atrial fibrillation with RVR. Performance deficits affecting function are weakness, impaired balance, decreased safety awareness, impaired endurance, impaired sensation, impaired functional mobility, impaired self care skills, decreased upper extremity function, decreased lower extremity function, gait instability.     Rehab Prognosis:  Good; patient would benefit from acute skilled OT services to address these deficits and reach maximum level of function.       Plan:     Patient to be seen 2 x/week to address the above listed problems via self-care/home management, therapeutic exercises, therapeutic activities  Plan of Care Expires: 11/16/24  Plan of Care Reviewed with: patient    Subjective     Chief Complaint: Fatigue  Patient/Family Comments/goals: Increase independence  Pain/Comfort:  Pain Rating 1: 0/10  Pain Rating Post-Intervention 1: 0/10    Objective:     Communicated with: Nurse prior to session.  Patient found up in chair with telemetry, peripheral IV upon OT entry to room.    General Precautions: Standard, fall    Orthopedic Precautions:N/A  Braces: N/A  Respiratory Status: Room air    AMPAC 6 Click ADL: 15    Treatment & Education:  Pt up in chair at start of therapy. Pt declined OOB activity reporting "I'm just too tired. No."  Pt educated on, and encouraged to perform BUE AROM exercises for BUE's to increase functional strength needed for daily activities. Pt verbalized understanding. Pt encouraged to continue with sitting up in chair to prevent pneumonia and functional endurance. Pt " verbalized understanding.    Patient left up in chair with all lines intact, call button in reach, and chair alarm on    GOALS:   Multidisciplinary Problems       Occupational Therapy Goals          Problem: Occupational Therapy    Goal Priority Disciplines Outcome Interventions   Occupational Therapy Goal     OT, PT/OT Progressing    Description: Goals to be met by: 11/16/24     Patient will increase functional independence with ADLs by performing:    Toileting from bedside commode with Minimal Assistance for hygiene and clothing management.   Toilet transfer to bedside commode with Minimal Assistance.  Increased functional strength in B UE to grossly WFL.                         Time Tracking:     OT Date of Treatment: 11/06/24  OT Start Time: 1215  OT Stop Time: 1225  OT Total Time (min): 10 min    Billable Minutes:Therapeutic Activity 10    MARILEE Jimenez  OT/ALFREDO: OT          11/6/2024

## 2024-11-07 LAB
ALBUMIN SERPL BCP-MCNC: 3.1 G/DL (ref 3.5–5.2)
ALP SERPL-CCNC: 49 U/L (ref 40–150)
ALT SERPL W/O P-5'-P-CCNC: 18 U/L (ref 10–44)
ANION GAP SERPL CALC-SCNC: 6 MMOL/L (ref 8–16)
AST SERPL-CCNC: 32 U/L (ref 10–40)
BASOPHILS # BLD AUTO: 0.02 K/UL (ref 0–0.2)
BASOPHILS NFR BLD: 0.3 % (ref 0–1.9)
BILIRUB SERPL-MCNC: 0.3 MG/DL (ref 0.1–1)
BUN SERPL-MCNC: 26 MG/DL (ref 8–23)
CALCIUM SERPL-MCNC: 8.7 MG/DL (ref 8.7–10.5)
CHLORIDE SERPL-SCNC: 110 MMOL/L (ref 95–110)
CO2 SERPL-SCNC: 22 MMOL/L (ref 23–29)
CREAT SERPL-MCNC: 1.4 MG/DL (ref 0.5–1.4)
DIFFERENTIAL METHOD BLD: ABNORMAL
EOSINOPHIL # BLD AUTO: 0.3 K/UL (ref 0–0.5)
EOSINOPHIL NFR BLD: 5.4 % (ref 0–8)
ERYTHROCYTE [DISTWIDTH] IN BLOOD BY AUTOMATED COUNT: 13.7 % (ref 11.5–14.5)
EST. GFR  (NO RACE VARIABLE): 55 ML/MIN/1.73 M^2
GLUCOSE SERPL-MCNC: 82 MG/DL (ref 70–110)
HCT VFR BLD AUTO: 34.5 % (ref 40–54)
HGB BLD-MCNC: 11 G/DL (ref 14–18)
IMM GRANULOCYTES # BLD AUTO: 0.02 K/UL (ref 0–0.04)
IMM GRANULOCYTES NFR BLD AUTO: 0.3 % (ref 0–0.5)
LYMPHOCYTES # BLD AUTO: 0.8 K/UL (ref 1–4.8)
LYMPHOCYTES NFR BLD: 14.6 % (ref 18–48)
MAGNESIUM SERPL-MCNC: 2 MG/DL (ref 1.6–2.6)
MCH RBC QN AUTO: 28 PG (ref 27–31)
MCHC RBC AUTO-ENTMCNC: 31.9 G/DL (ref 32–36)
MCV RBC AUTO: 88 FL (ref 82–98)
MONOCYTES # BLD AUTO: 0.5 K/UL (ref 0.3–1)
MONOCYTES NFR BLD: 9.2 % (ref 4–15)
NEUTROPHILS # BLD AUTO: 4 K/UL (ref 1.8–7.7)
NEUTROPHILS NFR BLD: 70.2 % (ref 38–73)
NRBC BLD-RTO: 0 /100 WBC
PHOSPHATE SERPL-MCNC: 3.2 MG/DL (ref 2.7–4.5)
PLATELET # BLD AUTO: 166 K/UL (ref 150–450)
PMV BLD AUTO: 9.2 FL (ref 9.2–12.9)
POTASSIUM SERPL-SCNC: 5.1 MMOL/L (ref 3.5–5.1)
PROT SERPL-MCNC: 6.8 G/DL (ref 6–8.4)
RBC # BLD AUTO: 3.93 M/UL (ref 4.6–6.2)
SODIUM SERPL-SCNC: 138 MMOL/L (ref 136–145)
WBC # BLD AUTO: 5.74 K/UL (ref 3.9–12.7)

## 2024-11-07 PROCEDURE — 25000003 PHARM REV CODE 250: Performed by: INTERNAL MEDICINE

## 2024-11-07 PROCEDURE — 83735 ASSAY OF MAGNESIUM: CPT | Performed by: NURSE PRACTITIONER

## 2024-11-07 PROCEDURE — 36415 COLL VENOUS BLD VENIPUNCTURE: CPT | Performed by: NURSE PRACTITIONER

## 2024-11-07 PROCEDURE — 85025 COMPLETE CBC W/AUTO DIFF WBC: CPT | Performed by: NURSE PRACTITIONER

## 2024-11-07 PROCEDURE — 25000003 PHARM REV CODE 250: Performed by: STUDENT IN AN ORGANIZED HEALTH CARE EDUCATION/TRAINING PROGRAM

## 2024-11-07 PROCEDURE — 80053 COMPREHEN METABOLIC PANEL: CPT | Performed by: NURSE PRACTITIONER

## 2024-11-07 PROCEDURE — 97116 GAIT TRAINING THERAPY: CPT

## 2024-11-07 PROCEDURE — 21400001 HC TELEMETRY ROOM

## 2024-11-07 PROCEDURE — 99233 SBSQ HOSP IP/OBS HIGH 50: CPT | Mod: ,,, | Performed by: INTERNAL MEDICINE

## 2024-11-07 PROCEDURE — 84100 ASSAY OF PHOSPHORUS: CPT | Performed by: NURSE PRACTITIONER

## 2024-11-07 PROCEDURE — 25000242 PHARM REV CODE 250 ALT 637 W/ HCPCS: Performed by: INTERNAL MEDICINE

## 2024-11-07 PROCEDURE — 25000003 PHARM REV CODE 250: Performed by: SPECIALIST

## 2024-11-07 PROCEDURE — 94640 AIRWAY INHALATION TREATMENT: CPT

## 2024-11-07 PROCEDURE — 25000003 PHARM REV CODE 250: Performed by: NURSE PRACTITIONER

## 2024-11-07 PROCEDURE — 25000242 PHARM REV CODE 250 ALT 637 W/ HCPCS: Performed by: NURSE PRACTITIONER

## 2024-11-07 PROCEDURE — 97530 THERAPEUTIC ACTIVITIES: CPT

## 2024-11-07 RX ADMIN — METOPROLOL SUCCINATE 25 MG: 25 TABLET, EXTENDED RELEASE ORAL at 08:11

## 2024-11-07 RX ADMIN — BUDESONIDE INHALATION 0.5 MG: 0.5 SUSPENSION RESPIRATORY (INHALATION) at 07:11

## 2024-11-07 RX ADMIN — RIVAROXABAN 20 MG: 20 TABLET, FILM COATED ORAL at 04:11

## 2024-11-07 RX ADMIN — FAMOTIDINE 20 MG: 20 TABLET ORAL at 08:11

## 2024-11-07 RX ADMIN — ACETAMINOPHEN 650 MG: 325 TABLET ORAL at 10:11

## 2024-11-07 RX ADMIN — ARFORMOTEROL TARTRATE 15 MCG: 15 SOLUTION RESPIRATORY (INHALATION) at 08:11

## 2024-11-07 RX ADMIN — EMPAGLIFLOZIN 10 MG: 10 TABLET, FILM COATED ORAL at 08:11

## 2024-11-07 RX ADMIN — SENNOSIDES AND DOCUSATE SODIUM 1 TABLET: 50; 8.6 TABLET ORAL at 08:11

## 2024-11-07 RX ADMIN — AMIODARONE HYDROCHLORIDE 200 MG: 200 TABLET ORAL at 08:11

## 2024-11-07 RX ADMIN — LOSARTAN POTASSIUM 12.5 MG: 25 TABLET, FILM COATED ORAL at 08:11

## 2024-11-07 RX ADMIN — ASPIRIN 81 MG: 81 TABLET, COATED ORAL at 08:11

## 2024-11-07 RX ADMIN — ATORVASTATIN CALCIUM 80 MG: 40 TABLET, FILM COATED ORAL at 08:11

## 2024-11-07 RX ADMIN — BUDESONIDE INHALATION 0.5 MG: 0.5 SUSPENSION RESPIRATORY (INHALATION) at 08:11

## 2024-11-07 RX ADMIN — ARFORMOTEROL TARTRATE 15 MCG: 15 SOLUTION RESPIRATORY (INHALATION) at 07:11

## 2024-11-07 NOTE — ASSESSMENT & PLAN NOTE
Patient has Systolic (HFrEF) heart failure that is Acute on chronic. On presentation their CHF was decompensated. Evidence of decompensated CHF on presentation includes: elevated JVD and shortness of breath. The etiology of their decompensation is likely atrial fibrillation . Most recent  echo results are listed below.      Latest ECHO  Results for orders placed during the hospital encounter of 10/29/24    Echo    Interpretation Summary    Left Ventricle: The left ventricle is normal in size. Ventricular mass is normal. Mildly increased wall thickness. There is concentric remodeling. There is normal systolic function with a visually estimated ejection fraction of 60 - 65%. Unable to assess diastolic function due to atrial fibrillation.    Right Ventricle: Severe right ventricular enlargement. Wall thickness is normal. Systolic function is moderately reduced.    Right Atrium: Right atrium is severely dilated.    Tricuspid Valve: There is severe regurgitation.    Pulmonic Valve: There is mild regurgitation.    Pulmonary Artery: There is moderate pulmonary hypertension. The estimated pulmonary artery systolic pressure is 59 mmHg.    IVC/SVC: Elevated venous pressure at 15 mmHg.    Current Heart Failure Medications  metoprolol succinate (TOPROL-XL) 24 hr tablet 25 mg, Daily, Oral  empagliflozin (Jardiance) tablet 10 mg, Daily, Oral  losartan split tablet 12.5 mg, Daily, Oral    Plan  - Monitor strict I&Os and daily weights.    - Place on telemetry  - Low sodium diet  - Cardiology has been consulted  - The patient's volume status is improving but not at their baseline as indicated by elevated JVD

## 2024-11-07 NOTE — SUBJECTIVE & OBJECTIVE
Interval History: Pt was seen and examined. Labs and meds reviewed. Discussed with other providers. No new events, no c/o's.    Review of patient's allergies indicates:  No Known Allergies  Current Facility-Administered Medications   Medication Frequency    acetaminophen tablet 650 mg Q6H PRN    amiodarone tablet 200 mg Daily    arformoteroL nebulizer solution 15 mcg BID    aspirin EC tablet 81 mg Daily    atorvastatin tablet 80 mg QHS    budesonide nebulizer solution 0.5 mg Q12H    empagliflozin (Jardiance) tablet 10 mg Daily    famotidine tablet 20 mg BID    influenza (adjuvanted) (Fluad) 45 mcg/0.5 mL IM vaccine (> or = 66 yo) 0.5 mL Prior to discharge    losartan split tablet 12.5 mg Daily    metoprolol succinate (TOPROL-XL) 24 hr tablet 25 mg Daily    ondansetron injection 8 mg Q6H PRN    pneumoc 20-haylee conj-dip cr(PF) (PREVNAR-20 (PF)) injection Syrg 0.5 mL vaccine x 1 dose    rivaroxaban tablet 20 mg Daily with dinner    senna-docusate 8.6-50 mg per tablet 1 tablet BID       Objective:     Vital Signs (Most Recent):  Temp: 97.6 °F (36.4 °C) (11/07/24 1601)  Pulse: (!) 57 (11/07/24 1721)  Resp: 16 (11/07/24 1601)  BP: 105/68 (11/07/24 1601)  SpO2: 96 % (11/07/24 1602) Vital Signs (24h Range):  Temp:  [97.6 °F (36.4 °C)-98.9 °F (37.2 °C)] 97.6 °F (36.4 °C)  Pulse:  [57-82] 57  Resp:  [16-18] 16  SpO2:  [95 %-98 %] 96 %  BP: (105-135)/(62-69) 105/68     Weight: 84.4 kg (186 lb 1.1 oz) (11/06/24 2328)  Body mass index is 27.48 kg/m².  Body surface area is 2.03 meters squared.    I/O last 3 completed shifts:  In: 320 [P.O.:320]  Out: -      Physical Exam  Vitals and nursing note reviewed.   Constitutional:       Appearance: Normal appearance.   Cardiovascular:      Rate and Rhythm: Normal rate and regular rhythm.   Pulmonary:      Breath sounds: Normal breath sounds.   Musculoskeletal:      Right lower leg: No edema.      Left lower leg: No edema.   Neurological:      Mental Status: He is alert and oriented to  person, place, and time.   Psychiatric:         Behavior: Behavior normal.          Significant Labs: reviewed  BMP  Lab Results   Component Value Date     11/07/2024    K 5.1 11/07/2024     11/07/2024    CO2 22 (L) 11/07/2024    BUN 26 (H) 11/07/2024    CREATININE 1.4 11/07/2024    CALCIUM 8.7 11/07/2024    ANIONGAP 6 (L) 11/07/2024    EGFRNORACEVR 55 (A) 11/07/2024     Lab Results   Component Value Date    WBC 5.74 11/07/2024    HGB 11.0 (L) 11/07/2024    HCT 34.5 (L) 11/07/2024    MCV 88 11/07/2024     11/07/2024

## 2024-11-07 NOTE — ASSESSMENT & PLAN NOTE
69 y/o male with CARINA after presenting with AF c RBR:     CARINA (acute kidney injury)  S Cr has improved, close to prior baseline. CARINA has resolved.  Baseline Cr is normal  Prerenal azotemia, secondary to AF, decreased renal perfusion.  K is normal  Na normal  Ca normal  Acid base, mild metabolic acidosis     HTN: BP is low, but stable  Pt is not on any major BP meds     Atrial fibrillation with RVR  Presented with AF c RVR  Diastolic CHF, CAD     Management of AF reviewed  S/p amiodarone drip, followed by PO  Heparin drip  On beta blockers  On xarelto     Plans and recommendations:  As discussed above  Total time spent 40 minutes including time needed to review the records, the   patient evaluation, documentation, face-to-face discussion with the patient,   more than 50% of the time was spent on coordination of care and counseling.    Level V visit.

## 2024-11-07 NOTE — SUBJECTIVE & OBJECTIVE
Interval History: . No acute events overnight, afebrile, hemodynamically stable.  Denies any acute concern. Pending SNF placement    Objective:     Vital Signs (Most Recent):  Temp: 98.3 °F (36.8 °C) (11/06/24 1914)  Pulse: 68 (11/06/24 1927)  Resp: 16 (11/06/24 1927)  BP: 135/64 (11/06/24 1914)  SpO2: 96 % (11/06/24 1927) Vital Signs (24h Range):  Temp:  [98 °F (36.7 °C)-98.3 °F (36.8 °C)] 98.3 °F (36.8 °C)  Pulse:  [50-69] 68  Resp:  [16-20] 16  SpO2:  [95 %-99 %] 96 %  BP: (103-135)/(52-83) 135/64     Weight: 87.7 kg (193 lb 5.5 oz)  Body mass index is 28.55 kg/m².  No intake or output data in the 24 hours ending 11/06/24 3433        Physical Exam  Vitals and nursing note reviewed.   Constitutional:       General: He is not in acute distress.     Appearance: He is ill-appearing. He is not toxic-appearing or diaphoretic.   HENT:      Head: Normocephalic and atraumatic.      Mouth/Throat:      Mouth: Mucous membranes are moist.   Eyes:      General: No scleral icterus.        Right eye: No discharge.         Left eye: No discharge.   Cardiovascular:      Rate and Rhythm: Normal rate and regular rhythm.      Heart sounds: Normal heart sounds.   Pulmonary:      Effort: Pulmonary effort is normal. No respiratory distress.   Abdominal:      General: Bowel sounds are normal.      Tenderness: There is no abdominal tenderness.   Musculoskeletal:      Cervical back: No rigidity.      Right lower leg: No edema.      Left lower leg: No edema.   Skin:     General: Skin is warm and dry.      Coloration: Skin is not jaundiced.   Neurological:      Mental Status: He is alert and oriented to person, place, and time. Mental status is at baseline.   Psychiatric:         Mood and Affect: Mood normal.         Behavior: Behavior normal.             Significant Labs: All pertinent labs within the past 24 hours have been reviewed.  LABS:  Recent Labs   Lab 11/04/24  0522 11/05/24  0432 11/06/24  0529    136 135*   K 5.1 5.2* 5.1     106 107   CO2 20* 21* 21*   BUN 31* 28* 28*   CREATININE 1.6* 1.5* 1.4   GLU 81 79 77   ANIONGAP 7* 9 7*     Recent Labs   Lab 11/04/24  0522 11/05/24 0432 11/06/24  0529   MG 1.8 2.0 2.0   PHOS 3.2 3.5 3.8     Recent Labs   Lab 10/31/24  0436 11/01/24  0420 11/04/24 0522 11/05/24 0432 11/06/24  0529   AST 16   < > 22 22 21   ALT 8*   < > 12 14 12   ALKPHOS 32*   < > 41 43 43   BILITOT 0.2   < > 0.5 0.5 0.4   BILIDIR <0.1*  --   --   --   --    ALBUMIN 2.7*  2.7*   < > 3.1* 3.2* 3.0*    < > = values in this interval not displayed.     POCT Glucose:   Recent Labs   Lab 11/05/24  1144 11/06/24  1157 11/06/24  2158   POCTGLUCOSE 94 85 89    Recent Labs   Lab 11/04/24 0522 11/05/24 0432 11/06/24  0529   WBC 7.69 7.61 5.75   HGB 10.7* 10.6* 10.6*   HCT 34.4* 34.0* 33.3*   * 131* 140*   GRAN 74.3*  5.7 76.8*  5.9 72.9  4.2        Micro  Blood Cultures  Lab Results   Component Value Date    LABBLOO No growth after 5 days. 10/30/2024    LABBLOO No growth after 5 days. 10/30/2024       Significant Imaging: I have reviewed all pertinent imaging results/findings within the past 24 hours.  US Lower Extrem Arteries Bilat with GEOVANNY (xpd)   Final Result      1.  Markedly abnormal study.  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  There is monophasic flow involving all vessels imaged with marked plaque formation diffusely.  There are low velocities involving the bilateral popliteal arteries and trifurcation vessels.      2.  There are elevated velocities involving both profunda femoral arteries, consistent with significant stenosis.      3.  The bilateral ABIs are unable to be measured due to noncompliance ankle vessels.         Electronically signed by: MD Macario West:    11/02/2024   Time:    18:56      US Kidney   Final Result      No hydronephrosis         Electronically signed by: Umair Grant MD   Date:    10/30/2024   Time:    11:06      X-Ray Chest AP  Portable   Final Result      Right-sided PICC is satisfactory         Electronically signed by: Robin Sanchez   Date:    10/29/2024   Time:    22:46          Inpatient Medications:  Continuous Infusions:  Scheduled Meds:   amiodarone  200 mg Oral Daily    arformoteroL  15 mcg Nebulization BID    aspirin  81 mg Oral Daily    atorvastatin  80 mg Oral QHS    budesonide  0.5 mg Nebulization Q12H    empagliflozin  10 mg Oral Daily    famotidine  20 mg Oral BID    losartan  12.5 mg Oral Daily    metoprolol succinate  25 mg Oral Daily    rivaroxaban  20 mg Oral Daily with dinner    senna-docusate 8.6-50 mg  1 tablet Oral BID     PRN Meds:  Current Facility-Administered Medications:     acetaminophen, 650 mg, Oral, Q6H PRN    influenza (adjuvanted), 0.5 mL, Intramuscular, Prior to discharge    ondansetron, 8 mg, Intravenous, Q6H PRN    pneumoc 20-haylee conj-dip cr(PF), 0.5 mL, Intramuscular, vaccine x 1 dose

## 2024-11-07 NOTE — ASSESSMENT & PLAN NOTE
Hyperkalemia is likely due to CARINA.The patients most recent potassium results are listed below.  Recent Labs     11/04/24  0522 11/05/24  0432 11/06/24  0529   K 5.1 5.2* 5.1       Plan  - Monitor for arrhythmias with EKG and/or continuous telemetry.

## 2024-11-07 NOTE — PT/OT/SLP PROGRESS
"Physical Therapy  Treatment    Layton Abrams   MRN: [id number]   Admitting Diagnosis: Atrial fibrillation with RVR       PT Start Time: 1000     PT Stop Time: 1015    PT Total Time (min): 15 min       Billable Minutes:  Gait Training 15    Treatment Type: Treatment  PT/PTA: PT     Number of PTA visits since last PT visit: 0       General Precautions: Standard, fall  Orthopedic Precautions: N/A  Braces: N/A  Respiratory Status: Room air    Spiritual, Cultural Beliefs, Yarsani Practices, Values that Affect Care: no    Subjective:  Communicated with nursing (Isauro) and performed chart review via epic system prior to session.  Pt agreeable to PT services with encouragement "I don't wanna get up"    Pain/Comfort  Pain Rating 1: 8/10 (pain to BLE "I don't know why it's hurting")  Pain Addressed 1: Reposition, Distraction, Nurse notified  Pain Rating Post-Intervention 1: 8/10    Objective:   Patient found with: peripheral IV, telemetry. Pt presents supine in bed    Functional Mobility:  Bed Mobility:    Facilitated supine to sit with mod A   Sit to supine with CGA   Seated scooting with CGA      Transfers:   Sit<>stand and stand pivot transfers with mod A and RW, cues for RW management and hand placement    Gait:    Facilitated gait training mod A with RW 10 ft x 2, demonstrated slow pace, flexed posture, forward lean, wide DREW, decreased step length and foot clearance, decreased heel strike to RLE ("it's been like that for a long time") and genu varus    Balance:   Dynamic Sit: FAIR+: Maintains balance through MINIMAL excursions of active trunk motion  Dynamic stand: POOR: Needs MOD (moderate) assist during gait       Treatment and Education:  Educated pt on benefits of consistent participation in PT services to meet functional goals. Educated pt on importance of sitting OOB to promote endurance and overall activity tolerance, but limited due to pt being soiled (nursing notified). Educated pt on call don't fall policy " and use of call button to alert nursing staff of needs (including to assist in/out of bed). Pt expressed understanding.      AM-PAC 6 CLICK MOBILITY  How much help from another person does this patient currently need?   1 = Unable, Total/Dependent Assistance  2 = A lot, Maximum/Moderate Assistance  3 = A little, Minimum/Contact Guard/Supervision  4 = None, Modified [place]/Independent    Turning over in bed (including adjusting bedclothes, sheets and blankets)?: 2  Sitting down on and standing up from a chair with arms (e.g., wheelchair, bedside commode, etc.): 2  Moving from lying on back to sitting on the side of the bed?: 2  Moving to and from a bed to a chair (including a wheelchair)?: 2  Need to walk in hospital room?: 2  Climbing 3-5 steps with a railing?: 1  Basic Mobility Total Score: 11    AM-PAC Raw Score CMS G-Code Modifier Level of Impairment Assistance   6 % Total / Unable   7 - 9 CM 80 - 100% Maximal Assist   10 - 14 CL 60 - 80% Moderate Assist   15 - 19 CK 40 - 60% Moderate Assist   20 - 22 CJ 20 - 40% Minimal Assist   23 CI 1-20% SBA / CGA   24 CH 0% Independent/ Mod I     Patient left supine with all lines intact, call button in reach, bed alarm on, and nursing notified.    Assessment:  Lyaton Abrams is a 68 y.o. male with a medical diagnosis of Atrial fibrillation with RVR and presents with deficits in overall mobility. Pt requires encouragement and education to participate but did well with no s/s of distress. Pt self-limits due to pain but is capable of improving functional status with skilled PT intervention at mod intensity.    Rehab identified problem list/impairments: weakness, impaired endurance, impaired functional mobility, gait instability, impaired balance, impaired cognition, decreased safety awareness, decreased lower extremity function, pain    Rehab potential is good.    Activity tolerance: Fair    Discharge recommendations: Moderate Intensity Therapy      Barriers to  discharge:      Equipment recommendations: to be determined by next level of care     GOALS:   Multidisciplinary Problems       Physical Therapy Goals          Problem: Physical Therapy    Goal Priority Disciplines Outcome Interventions   Physical Therapy Goal     PT, PT/OT Progressing    Description: Goals to be met by 11/15/24.  1. Pt will complete bed mobility MIN A.  2. Pt will complete sit to stand MIN A.  3. Pt will ambulate 50ft MIN A using RW.  4. Pt will increase AMPAC score by 2 points to progress functional mobility.                       PLAN:    Patient to be seen 3 x/week to address the above listed problems via gait training, therapeutic activities, therapeutic exercises, neuromuscular re-education  Plan of Care expires: 11/15/24  Plan of Care reviewed with: patient         11/07/2024

## 2024-11-07 NOTE — PROGRESS NOTES
[facility] - Telemetry ([facility])  Nephrology  Progress Note    Patient Name: Layton Abrams  MRN: [id number]  Admission Date: 10/29/2024  Hospital Length of Stay: 9 days  Attending Provider: David Gunderson DO   Primary Care Physician: Nellie Cadena MD  Principal Problem:Atrial fibrillation with RVR    Subjective:     HPI: Noted    Interval History: Pt was seen and examined. Labs and meds reviewed. Discussed with other providers. No new events, no c/o's.    Review of patient's allergies indicates:  No Known Allergies  Current Facility-Administered Medications   Medication Frequency    acetaminophen tablet 650 mg Q6H PRN    amiodarone tablet 200 mg Daily    arformoteroL nebulizer solution 15 mcg BID    aspirin EC tablet 81 mg Daily    atorvastatin tablet 80 mg QHS    budesonide nebulizer solution 0.5 mg Q12H    empagliflozin (Jardiance) tablet 10 mg Daily    famotidine tablet 20 mg BID    influenza (adjuvanted) (Fluad) 45 mcg/0.5 mL IM vaccine (> or = 64 yo) 0.5 mL Prior to discharge    losartan split tablet 12.5 mg Daily    metoprolol succinate (TOPROL-XL) 24 hr tablet 25 mg Daily    ondansetron injection 8 mg Q6H PRN    pneumoc 20-haylee conj-dip cr(PF) (PREVNAR-20 (PF)) injection Syrg 0.5 mL vaccine x 1 dose    rivaroxaban tablet 20 mg Daily with dinner    senna-docusate 8.6-50 mg per tablet 1 tablet BID       Objective:     Vital Signs (Most Recent):  Temp: 97.6 °F (36.4 °C) (11/07/24 1601)  Pulse: (!) 57 (11/07/24 1721)  Resp: 16 (11/07/24 1601)  BP: 105/68 (11/07/24 1601)  SpO2: 96 % (11/07/24 1602) Vital Signs (24h Range):  Temp:  [97.6 °F (36.4 °C)-98.9 °F (37.2 °C)] 97.6 °F (36.4 °C)  Pulse:  [57-82] 57  Resp:  [16-18] 16  SpO2:  [95 %-98 %] 96 %  BP: (105-135)/(62-69) 105/68     Weight: 84.4 kg (186 lb 1.1 oz) (11/06/24 5836)  Body mass index is 27.48 kg/m².  Body surface area is 2.03 meters squared.    I/O last 3 completed shifts:  In: 320 [P.O.:320]  Out: -      Physical Exam  Vitals and nursing note  reviewed.   Constitutional:       Appearance: Normal appearance.   Cardiovascular:      Rate and Rhythm: Normal rate and regular rhythm.   Pulmonary:      Breath sounds: Normal breath sounds.   Musculoskeletal:      Right lower leg: No edema.      Left lower leg: No edema.   Neurological:      Mental Status: He is alert and oriented to person, place, and time.   Psychiatric:         Behavior: Behavior normal.          Significant Labs: reviewed  BMP  Lab Results   Component Value Date     11/07/2024    K 5.1 11/07/2024     11/07/2024    CO2 22 (L) 11/07/2024    BUN 26 (H) 11/07/2024    CREATININE 1.4 11/07/2024    CALCIUM 8.7 11/07/2024    ANIONGAP 6 (L) 11/07/2024    EGFRNORACEVR 55 (A) 11/07/2024     Lab Results   Component Value Date    WBC 5.74 11/07/2024    HGB 11.0 (L) 11/07/2024    HCT 34.5 (L) 11/07/2024    MCV 88 11/07/2024     11/07/2024         Assessment/Plan:     69 y/o male with CARINA after presenting with AF c RBR:     CARINA (acute kidney injury)  S Cr remains improved, close to prior baseline. CARINA has resolved.  Baseline Cr is normal  Prerenal azotemia, secondary to AF, decreased renal perfusion.  K is normal  Na normal  Ca normal  Acid base, mild metabolic acidosis     HTN: BP is somewhat low, but stable  Pt is not on any major BP meds     Atrial fibrillation with RVR  Presented with AF c RVR  Diastolic CHF, CAD     Management of AF reviewed  S/p amiodarone drip, followed by PO  Heparin drip  On beta blockers  On xarelto     Plans and recommendations:  As discussed above  Will sign off now, please call for any questions  Total time spent 40 minutes including time needed to review the records, the   patient evaluation, documentation, face-to-face discussion with the patient,   more than 50% of the time was spent on coordination of care and counseling.    Level V visit.        Thank you for your consult.     Lashawn Hamilton MD  Nephrology  [facility] - Telemetry ([facility])

## 2024-11-07 NOTE — PT/OT/SLP PROGRESS
Occupational Therapy   Treatment    Name: Layton Abrams  MRN: [id number]  Admitting Diagnosis:  Atrial fibrillation with RVR       Recommendations:     Discharge Recommendations: Moderate Intensity Therapy  Discharge Equipment Recommendations:  to be determined by next level of care  Barriers to discharge:  Decreased caregiver support    Assessment:     Layton Abrams is a 68 y.o. male with a medical diagnosis of Atrial fibrillation with RVR.  Performance deficits affecting function are weakness, gait instability, decreased upper extremity function, decreased ROM, decreased lower extremity function, impaired balance, impaired endurance, decreased safety awareness, pain, impaired self care skills, impaired functional mobility, decreased coordination.     Rehab Prognosis:  Good; patient would benefit from acute skilled OT services to address these deficits and reach maximum level of function.       Plan:     Patient to be seen 2 x/week to address the above listed problems via self-care/home management, therapeutic activities, therapeutic exercises  Plan of Care Expires: 11/21/24  Plan of Care Reviewed with: patient    Subjective     Chief Complaint: BLE pain  Patient/Family Comments/goals: increase independence  Pain/Comfort:  Pain Rating 1: 8/10  Location - Side 1: Bilateral  Location - Orientation 1: generalized  Location 1: leg  Pain Addressed 1: Reposition, Distraction, Nurse notified  Pain Rating Post-Intervention 1: 8/10    Objective:     Communicated with: Nurse prior to session.  Patient found supine with telemetry, peripheral IV upon OT entry to room.    General Precautions: Standard, fall    Orthopedic Precautions:N/A  Braces: N/A  Respiratory Status: Room air     Occupational Performance:     Bed Mobility:    Patient completed Rolling/Turning to Left with  moderate assistance  Patient completed Scooting/Bridging with contact guard assistance  Patient completed Supine to Sit with moderate  assistance  Patient completed Sit to Supine with contact guard assistance     Functional Mobility/Transfers:  Patient completed Sit <> Stand Transfer with moderate assistance  with  rolling walker   Functional Mobility: Pt ambulated with RW mod A x20 feet with difficulty bearing weight to RLE     Activities of Daily Living:  Toileting: dependence incontinent bladder      AMPAC 6 Click ADL: 16    Treatment & Education:  Pt initially required encouragement to participate in therapy session.   Pt ambulated x20 feet mod A with RW, however requested to return to supine and declined sitting up in chair due to BLE pain.  Assisted patient back to EOB mod A. Sit to supine CGA. Nursing notified of BLE pain and patient's request for pain medication.   Pt encouraged to continue with upright position (once pain subsides) to prevent pneumonia and to increase functional endurance. Pt verbalized understanding.    Patient left supine with all lines intact, call button in reach, bed alarm on, and nurse notified    GOALS:   Multidisciplinary Problems       Occupational Therapy Goals          Problem: Occupational Therapy    Goal Priority Disciplines Outcome Interventions   Occupational Therapy Goal     OT, PT/OT Progressing    Description: Goals to be met by: 11/16/24     Patient will increase functional independence with ADLs by performing:    Toileting from bedside commode with Minimal Assistance for hygiene and clothing management.   Toilet transfer to bedside commode with Minimal Assistance.  Increased functional strength in B UE to grossly WFL.                         Time Tracking:     OT Date of Treatment: 11/07/24  OT Start Time: 1000  OT Stop Time: 1015  OT Total Time (min): 15 min    Billable Minutes:Therapeutic Activity 15    MARILEE Jimenez  OT/ALFREDO: OT          11/7/2024

## 2024-11-07 NOTE — PLAN OF CARE
TX COMPLETED: facilitated bed mobility with mod A, transfers with mod A and RW, ambulated 10 ft x 2 mod A with RW. Recommend continued PT services at mod intensity

## 2024-11-07 NOTE — PLAN OF CARE
Problem: Adult Inpatient Plan of Care  Goal: Plan of Care Review  Outcome: Progressing  Goal: Patient-Specific Goal (Individualized)  Outcome: Progressing  Goal: Absence of Hospital-Acquired Illness or Injury  Outcome: Progressing  Goal: Optimal Comfort and Wellbeing  Outcome: Progressing  Goal: Readiness for Transition of Care  Outcome: Progressing     Problem: Wound  Goal: Optimal Coping  Outcome: Progressing  Goal: Optimal Functional Ability  Outcome: Progressing  Goal: Absence of Infection Signs and Symptoms  Outcome: Progressing  Goal: Improved Oral Intake  Outcome: Progressing  Goal: Optimal Pain Control and Function  Outcome: Progressing  Goal: Skin Health and Integrity  Outcome: Progressing  Goal: Optimal Wound Healing  Outcome: Progressing     Problem: Infection  Goal: Absence of Infection Signs and Symptoms  Outcome: Progressing     Problem: Acute Kidney Injury/Impairment  Goal: Fluid and Electrolyte Balance  Outcome: Progressing  Goal: Improved Oral Intake  Outcome: Progressing  Goal: Effective Renal Function  Outcome: Progressing     Problem: Skin Injury Risk Increased  Goal: Skin Health and Integrity  Outcome: Progressing     Problem: Fall Injury Risk  Goal: Absence of Fall and Fall-Related Injury  Outcome: Progressing     Problem: Fatigue  Goal: Improved Activity Tolerance  Outcome: Progressing

## 2024-11-07 NOTE — PLAN OF CARE
A232/A232 SANTOS Abrams is a 68 y.o.male admitted on 10/29/2024 for Atrial fibrillation with RVR   Code Status: Full Code MRN: [id number]   Review of patient's allergies indicates:  No Known Allergies  Past Medical History:   Diagnosis Date    Arthritis     CHF (congestive heart failure)     COPD (chronic obstructive pulmonary disease)     Hypertension     Liver disease     Stroke       PRN meds    acetaminophen, 650 mg, Q6H PRN  influenza (adjuvanted), 0.5 mL, Prior to discharge  ondansetron, 8 mg, Q6H PRN  pneumoc 20-haylee conj-dip cr(PF), 0.5 mL, vaccine x 1 dose      Chart check completed. Will continue plan of care.   Encourage pt to turn Q2 hours.      Orientation: disoriented to, place  [place] Coma Scale Score: 15     Lead Monitored: Lead II Rhythm: atrial rhythm Frequency/Ectopy: PACs  Cardiac/Telemetry Box Number: [id number]  VTE Core Measure: Pharmacological prophylaxis initiated/maintained Last Bowel Movement: 11/07/24  Diet Renal  Voiding Characteristics: incontinence  Chris Score: 16  Fall Risk Score: 17  Accucheck []   Freq?      Lines/Drains/Airways       Peripheral Intravenous Line  Duration                  Peripheral IV - Single Lumen 11/06/24 1521 20 G Left;Posterior Forearm 1 day

## 2024-11-07 NOTE — PROGRESS NOTES
[facility] Medicine  Progress Note    Patient Name: Layton Abrams  MRN: [id number]  Patient Class: IP- Inpatient   Admission Date: 10/29/2024  Length of Stay: 8 days  Attending Physician: David Gunderson DO  Primary Care Provider: Nellie Cadena MD        Subjective:     Principal Problem:Atrial fibrillation with RVR        HPI:  The patient is 68-year-old male with a history of atrial fibrillation, coronary artery disease with stenting, hypertension, stroke with right-sided deficits, COPD, HCV, left peroneal DVT, Xarelto use, and lower extremity cellulitis presented to [facility] Emergency Department on October 29 with weakness that began on the morning of presentation.  By report he had been feeling well the day prior but did not feel well on October 29.  In the emergency department he had AFib with RVR and hypotension, but oxygenation was stable on room air.  Patient was drowsy but would wake and speak without difficulty.  He reported no chest pain or shortness of breath.  Labs were concerning for CARINA with  and creatinine 5.38.  Potassium was 7.  High sensitivity troponin was in the normal range at 40.  Chest x-ray had clear lungs but did show marked cardiomegaly.  ED attempted cardioversion with the AFib with RVR, but the patient did not cardiovert.  By report, EKGs did not show evidence of significant peak T-waves. He received calcium, dextrose/insulin, lactated Ringer's, IV metoprolol, IV bicarbonate, and Lokelma.  Levophed infusion was initiated.  Blood pressure improved with the Levophed, but tachycardia persisted.  Patient has a PICC line in place.  He also had a Wilcox catheter placed and had a small amount of urine with catheter placement.  Case discussed with the ED doctor and with Critical Care Medicine at [facility].  Patient transfer to [facility] and admitted to the ICU. He was placed on bicarb and amiodarone infusions.         Overview/Hospital Course:  The patient presented with weakness that began on the morning of presentation. By report he had been feeling well the day prior but did not feel well on October 29. In the emergency department he had AFib with RVR and hypotension, but oxygenation was stable on room air. Patient was drowsy but would wake and speak without difficulty. He reported no chest pain or shortness of breath. Labs were concerning for CARINA with  and creatinine 5.38. Potassium was 7. High sensitivity troponin was in the normal range at 40. Chest x-ray had clear lungs but did show marked cardiomegaly. ED attempted cardioversion with the AFib with RVR, but the patient did not cardiovert. By report, EKGs did not show evidence of significant peak T-waves. He received calcium, dextrose/insulin, lactated Ringer's, IV metoprolol, IV bicarbonate, and Lokelma. Levophed infusion was initiated. Blood pressure improved with the Levophed, but tachycardia persisted. Patient has a PICC line in place. He also had a Wilcox catheter placed and had a small amount of urine with catheter placement. Case discussed with the ED doctor and with Critical Care Medicine at [facility]. Patient transfer to [facility] and admitted to the ICU. He was placed on bicarb and amiodarone infusions. He was weaned off of levophed.  His renal function improved. Arterial US revealed PAD and occluded sfa. Vascular surgery was consulted, and evaluation noted plans for outpatient follow up with left leg angiogram for possible intervention and right leg intervention in the near future as well.  Plan to follow up in 2-3 weeks after medical stabilization.  Pending SNF placement    Interval History: . No acute events overnight, afebrile, hemodynamically stable.  Denies any acute concern. Pending SNF placement    Objective:     Vital Signs (Most Recent):  Temp: 98.3 °F (36.8 °C) (11/06/24 1914)  Pulse: 68 (11/06/24 1927)  Resp: 16 (11/06/24  1927)  BP: 135/64 (11/06/24 1914)  SpO2: 96 % (11/06/24 1927) Vital Signs (24h Range):  Temp:  [98 °F (36.7 °C)-98.3 °F (36.8 °C)] 98.3 °F (36.8 °C)  Pulse:  [50-69] 68  Resp:  [16-20] 16  SpO2:  [95 %-99 %] 96 %  BP: (103-135)/(52-83) 135/64     Weight: 87.7 kg (193 lb 5.5 oz)  Body mass index is 28.55 kg/m².  No intake or output data in the 24 hours ending 11/06/24 2251        Physical Exam  Vitals and nursing note reviewed.   Constitutional:       General: He is not in acute distress.     Appearance: He is ill-appearing. He is not toxic-appearing or diaphoretic.   HENT:      Head: Normocephalic and atraumatic.      Mouth/Throat:      Mouth: Mucous membranes are moist.   Eyes:      General: No scleral icterus.        Right eye: No discharge.         Left eye: No discharge.   Cardiovascular:      Rate and Rhythm: Normal rate and regular rhythm.      Heart sounds: Normal heart sounds.   Pulmonary:      Effort: Pulmonary effort is normal. No respiratory distress.   Abdominal:      General: Bowel sounds are normal.      Tenderness: There is no abdominal tenderness.   Musculoskeletal:      Cervical back: No rigidity.      Right lower leg: No edema.      Left lower leg: No edema.   Skin:     General: Skin is warm and dry.      Coloration: Skin is not jaundiced.   Neurological:      Mental Status: He is alert and oriented to person, place, and time. Mental status is at baseline.   Psychiatric:         Mood and Affect: Mood normal.         Behavior: Behavior normal.             Significant Labs: All pertinent labs within the past 24 hours have been reviewed.  LABS:  Recent Labs   Lab 11/04/24 0522 11/05/24 0432 11/06/24  0529    136 135*   K 5.1 5.2* 5.1    106 107   CO2 20* 21* 21*   BUN 31* 28* 28*   CREATININE 1.6* 1.5* 1.4   GLU 81 79 77   ANIONGAP 7* 9 7*     Recent Labs   Lab 11/04/24 0522 11/05/24 0432 11/06/24  0529   MG 1.8 2.0 2.0   PHOS 3.2 3.5 3.8     Recent Labs   Lab 10/31/24  0435  11/01/24  0420 11/04/24 0522 11/05/24 0432 11/06/24  0529   AST 16   < > 22 22 21   ALT 8*   < > 12 14 12   ALKPHOS 32*   < > 41 43 43   BILITOT 0.2   < > 0.5 0.5 0.4   BILIDIR <0.1*  --   --   --   --    ALBUMIN 2.7*  2.7*   < > 3.1* 3.2* 3.0*    < > = values in this interval not displayed.     POCT Glucose:   Recent Labs   Lab 11/05/24  1144 11/06/24  1157 11/06/24  2158   POCTGLUCOSE 94 85 89    Recent Labs   Lab 11/04/24 0522 11/05/24 0432 11/06/24 0529   WBC 7.69 7.61 5.75   HGB 10.7* 10.6* 10.6*   HCT 34.4* 34.0* 33.3*   * 131* 140*   GRAN 74.3*  5.7 76.8*  5.9 72.9  4.2        Micro  Blood Cultures  Lab Results   Component Value Date    LABBLOO No growth after 5 days. 10/30/2024    LABBLOO No growth after 5 days. 10/30/2024       Significant Imaging: I have reviewed all pertinent imaging results/findings within the past 24 hours.  US Lower Extrem Arteries Bilat with GEOVANNY (xpd)   Final Result      1.  Markedly abnormal study.  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  There is monophasic flow involving all vessels imaged with marked plaque formation diffusely.  There are low velocities involving the bilateral popliteal arteries and trifurcation vessels.      2.  There are elevated velocities involving both profunda femoral arteries, consistent with significant stenosis.      3.  The bilateral ABIs are unable to be measured due to noncompliance ankle vessels.         Electronically signed by: Bentley Dumont MD   Date:    11/02/2024   Time:    18:56      US Kidney   Final Result      No hydronephrosis         Electronically signed by: Umair Grant MD   Date:    10/30/2024   Time:    11:06      X-Ray Chest AP Portable   Final Result      Right-sided PICC is satisfactory         Electronically signed by: Robin Sanchez   Date:    10/29/2024   Time:    22:46          Inpatient Medications:  Continuous Infusions:  Scheduled Meds:   amiodarone  200 mg Oral Daily     "arformoteroL  15 mcg Nebulization BID    aspirin  81 mg Oral Daily    atorvastatin  80 mg Oral QHS    budesonide  0.5 mg Nebulization Q12H    empagliflozin  10 mg Oral Daily    famotidine  20 mg Oral BID    losartan  12.5 mg Oral Daily    metoprolol succinate  25 mg Oral Daily    rivaroxaban  20 mg Oral Daily with dinner    senna-docusate 8.6-50 mg  1 tablet Oral BID     PRN Meds:  Current Facility-Administered Medications:     acetaminophen, 650 mg, Oral, Q6H PRN    influenza (adjuvanted), 0.5 mL, Intramuscular, Prior to discharge    ondansetron, 8 mg, Intravenous, Q6H PRN    pneumoc 20-haylee conj-dip cr(PF), 0.5 mL, Intramuscular, vaccine x 1 dose      Assessment/Plan:      * Atrial fibrillation with RVR  Patient has paroxysmal (<7 days) atrial fibrillation. Patient is currently in sinus rhythm. [id number] Score: 1. The patients heart rate in the last 24 hours is as follows:  Pulse  Min: 50  Max: 69     Antiarrhythmics  amiodarone tablet 200 mg, Daily, Oral  metoprolol succinate (TOPROL-XL) 24 hr tablet 25 mg, Daily, Oral    Anticoagulants  rivaroxaban tablet 20 mg, With dinner, Oral    Plan  - Replete lytes with a goal of K>4, Mg >2  - Patient is anticoagulated, see medications listed above.  - Patient's afib is currently controlled  - cardiology following        PAD (peripheral artery disease)  Lower extremity altered ultrasound noted "occlusion of the left mid and distal superficial femoral arteries "    -Vascular surgery was consulted, and evaluation noted plans for outpatient follow up with left leg angiogram for possible intervention and right leg intervention in the near future as well.  Plan to follow up in 2-3 weeks after medical stabilization.      -continue aspirin    HFrEF (heart failure with reduced ejection fraction)  Patient has Systolic (HFrEF) heart failure that is Acute on chronic. On presentation their CHF was decompensated. Evidence of decompensated CHF on presentation includes: elevated JVD and " shortness of breath. The etiology of their decompensation is likely atrial fibrillation . Most recent  echo results are listed below.      Latest ECHO  Results for orders placed during the hospital encounter of 10/29/24    Echo    Interpretation Summary    Left Ventricle: The left ventricle is normal in size. Ventricular mass is normal. Mildly increased wall thickness. There is concentric remodeling. There is normal systolic function with a visually estimated ejection fraction of 60 - 65%. Unable to assess diastolic function due to atrial fibrillation.    Right Ventricle: Severe right ventricular enlargement. Wall thickness is normal. Systolic function is moderately reduced.    Right Atrium: Right atrium is severely dilated.    Tricuspid Valve: There is severe regurgitation.    Pulmonic Valve: There is mild regurgitation.    Pulmonary Artery: There is moderate pulmonary hypertension. The estimated pulmonary artery systolic pressure is 59 mmHg.    IVC/SVC: Elevated venous pressure at 15 mmHg.    Current Heart Failure Medications  metoprolol succinate (TOPROL-XL) 24 hr tablet 25 mg, Daily, Oral  empagliflozin (Jardiance) tablet 10 mg, Daily, Oral  losartan split tablet 12.5 mg, Daily, Oral    Plan  - Monitor strict I&Os and daily weights.    - Place on telemetry  - Low sodium diet  - Cardiology has been consulted  - The patient's volume status is improving but not at their baseline as indicated by elevated JVD            Panlobular emphysema  Patient's COPD is controlled currently.  Patient is currently off COPD Pathway. Continue scheduled inhalers Supplemental oxygen and monitor respiratory status closely.     Hyperkalemia  Hyperkalemia is likely due to CARINA.The patients most recent potassium results are listed below.  Recent Labs     11/04/24  0522 11/05/24  0432 11/06/24  0529   K 5.1 5.2* 5.1       Plan  - Monitor for arrhythmias with EKG and/or continuous telemetry.               CARINA (acute kidney injury)  CARINA is  likely due to  multiple factors, HF, afib, hypotension . Baseline creatinine is  0.7-1 . Most recent creatinine and eGFR are listed below.  Recent Labs     11/04/24  0522 11/05/24  0432 11/06/24  0529   CREATININE 1.6* 1.5* 1.4   EGFRNORACEVR 47* 51* 55*        Plan  - CARINA is improving  - Avoid nephrotoxins and renally dose meds for GFR listed above  - Monitor urine output, serial BMP, and adjust therapy as needed  - appreciate nephrology      VTE Risk Mitigation (From admission, onward)           Ordered     rivaroxaban tablet 20 mg  With dinner         11/05/24 1000                    Discharge Planning   WIL: 11/6/2024     Code Status: Full Code   Is the patient medically ready for discharge?:     Reason for patient still in hospital (select all that apply): Pending disposition  Discharge Plan A: Skilled Nursing Facility   Discharge Delays: None known at this time              David Gunderson DO  Department of Hospital Medicine   [facility] - Telemetry ([facility])    Voice recognition software was used in the creation of this note/communication and any sound-alike/typographical errors which may have occurred despite initial review prior to signing should be taken in context when interpreting.  If such errors prevent a clear understanding of the note/communication, please contact the provider/office for clarification.

## 2024-11-07 NOTE — PLAN OF CARE
Encouragement needed to participate.   Supine to sit mod A  Seated scoot CGA  Sit to stand with RW mod A  Ambulated x20 feet mod A  Transfer to EOB mod A  Sit to supine CGA    Recommend moderate intensity therapy at DC

## 2024-11-07 NOTE — ASSESSMENT & PLAN NOTE
CARINA is likely due to  multiple factors, HF, afib, hypotension . Baseline creatinine is  0.7-1 . Most recent creatinine and eGFR are listed below.  Recent Labs     11/04/24  0522 11/05/24  0432 11/06/24  0529   CREATININE 1.6* 1.5* 1.4   EGFRNORACEVR 47* 51* 55*        Plan  - CARINA is improving  - Avoid nephrotoxins and renally dose meds for GFR listed above  - Monitor urine output, serial BMP, and adjust therapy as needed  - appreciate nephrology

## 2024-11-07 NOTE — PLAN OF CARE
JANENE reached out to [facility] and spoke to Yarelis in admissions. Per Yarelis, they have the referral, however have not started reviewing the referral. Per Yarelis, they did receive the referral and she will send to clinical team now for review. Yarelis stated someone will call SW back with an answer.    10 56 JANENE reached out and spoke to Maldonado, with [facility] regarding patient's referral. Per Maldonado, they are able to accept Patient for care and can submit to his insurance today. Per Maldonado, they will have a bed available Monday for Patient to admit, once they get insurance authorization. JANENE provided name and number for Patient's daughter, Monica, for admissions paperwork and SW stated she would reach out to sister regarding acceptance to [facility].    JANENE attempted to contact Patient's daughter, regarding acceptance by [facility]. Patient's sister did not answer, but JANENE was able to leave a VM with information and call back number.     JANENE will continue to follow and assist as needed.

## 2024-11-07 NOTE — ASSESSMENT & PLAN NOTE
Patient has paroxysmal (<7 days) atrial fibrillation. Patient is currently in sinus rhythm. [id number] Score: 1. The patients heart rate in the last 24 hours is as follows:  Pulse  Min: 50  Max: 69     Antiarrhythmics  amiodarone tablet 200 mg, Daily, Oral  metoprolol succinate (TOPROL-XL) 24 hr tablet 25 mg, Daily, Oral    Anticoagulants  rivaroxaban tablet 20 mg, With dinner, Oral    Plan  - Replete lytes with a goal of K>4, Mg >2  - Patient is anticoagulated, see medications listed above.  - Patient's afib is currently controlled  - cardiology following

## 2024-11-08 LAB
ALBUMIN SERPL BCP-MCNC: 3.2 G/DL (ref 3.5–5.2)
ALP SERPL-CCNC: 48 U/L (ref 40–150)
ALT SERPL W/O P-5'-P-CCNC: 19 U/L (ref 10–44)
ANION GAP SERPL CALC-SCNC: 9 MMOL/L (ref 8–16)
AST SERPL-CCNC: 28 U/L (ref 10–40)
BASOPHILS # BLD AUTO: 0.01 K/UL (ref 0–0.2)
BASOPHILS NFR BLD: 0.2 % (ref 0–1.9)
BILIRUB SERPL-MCNC: 0.3 MG/DL (ref 0.1–1)
BUN SERPL-MCNC: 29 MG/DL (ref 8–23)
CALCIUM SERPL-MCNC: 8.9 MG/DL (ref 8.7–10.5)
CHLORIDE SERPL-SCNC: 110 MMOL/L (ref 95–110)
CO2 SERPL-SCNC: 19 MMOL/L (ref 23–29)
CREAT SERPL-MCNC: 1.4 MG/DL (ref 0.5–1.4)
DIFFERENTIAL METHOD BLD: ABNORMAL
EOSINOPHIL # BLD AUTO: 0.3 K/UL (ref 0–0.5)
EOSINOPHIL NFR BLD: 5.6 % (ref 0–8)
ERYTHROCYTE [DISTWIDTH] IN BLOOD BY AUTOMATED COUNT: 13.9 % (ref 11.5–14.5)
EST. GFR  (NO RACE VARIABLE): 55 ML/MIN/1.73 M^2
GLUCOSE SERPL-MCNC: 80 MG/DL (ref 70–110)
HCT VFR BLD AUTO: 36.3 % (ref 40–54)
HGB BLD-MCNC: 11.2 G/DL (ref 14–18)
IMM GRANULOCYTES # BLD AUTO: 0.02 K/UL (ref 0–0.04)
IMM GRANULOCYTES NFR BLD AUTO: 0.4 % (ref 0–0.5)
LYMPHOCYTES # BLD AUTO: 1.1 K/UL (ref 1–4.8)
LYMPHOCYTES NFR BLD: 21.9 % (ref 18–48)
MAGNESIUM SERPL-MCNC: 2 MG/DL (ref 1.6–2.6)
MCH RBC QN AUTO: 27.7 PG (ref 27–31)
MCHC RBC AUTO-ENTMCNC: 30.9 G/DL (ref 32–36)
MCV RBC AUTO: 90 FL (ref 82–98)
MONOCYTES # BLD AUTO: 0.6 K/UL (ref 0.3–1)
MONOCYTES NFR BLD: 12 % (ref 4–15)
NEUTROPHILS # BLD AUTO: 3 K/UL (ref 1.8–7.7)
NEUTROPHILS NFR BLD: 59.9 % (ref 38–73)
NRBC BLD-RTO: 0 /100 WBC
PHOSPHATE SERPL-MCNC: 3.6 MG/DL (ref 2.7–4.5)
PLATELET # BLD AUTO: 180 K/UL (ref 150–450)
PMV BLD AUTO: 9.8 FL (ref 9.2–12.9)
POTASSIUM SERPL-SCNC: 4.9 MMOL/L (ref 3.5–5.1)
PROT SERPL-MCNC: 6.8 G/DL (ref 6–8.4)
RBC # BLD AUTO: 4.04 M/UL (ref 4.6–6.2)
SODIUM SERPL-SCNC: 138 MMOL/L (ref 136–145)
WBC # BLD AUTO: 5.02 K/UL (ref 3.9–12.7)

## 2024-11-08 PROCEDURE — 25000003 PHARM REV CODE 250: Performed by: SPECIALIST

## 2024-11-08 PROCEDURE — 25000003 PHARM REV CODE 250: Performed by: NURSE PRACTITIONER

## 2024-11-08 PROCEDURE — 85025 COMPLETE CBC W/AUTO DIFF WBC: CPT | Performed by: NURSE PRACTITIONER

## 2024-11-08 PROCEDURE — 25000242 PHARM REV CODE 250 ALT 637 W/ HCPCS: Performed by: NURSE PRACTITIONER

## 2024-11-08 PROCEDURE — 25000003 PHARM REV CODE 250: Performed by: INTERNAL MEDICINE

## 2024-11-08 PROCEDURE — 84100 ASSAY OF PHOSPHORUS: CPT | Performed by: NURSE PRACTITIONER

## 2024-11-08 PROCEDURE — 25000242 PHARM REV CODE 250 ALT 637 W/ HCPCS: Performed by: INTERNAL MEDICINE

## 2024-11-08 PROCEDURE — 97530 THERAPEUTIC ACTIVITIES: CPT | Mod: CQ

## 2024-11-08 PROCEDURE — 36415 COLL VENOUS BLD VENIPUNCTURE: CPT | Performed by: NURSE PRACTITIONER

## 2024-11-08 PROCEDURE — 93005 ELECTROCARDIOGRAM TRACING: CPT

## 2024-11-08 PROCEDURE — 21400001 HC TELEMETRY ROOM

## 2024-11-08 PROCEDURE — 25000003 PHARM REV CODE 250: Performed by: STUDENT IN AN ORGANIZED HEALTH CARE EDUCATION/TRAINING PROGRAM

## 2024-11-08 PROCEDURE — 94761 N-INVAS EAR/PLS OXIMETRY MLT: CPT

## 2024-11-08 PROCEDURE — 83735 ASSAY OF MAGNESIUM: CPT | Performed by: NURSE PRACTITIONER

## 2024-11-08 PROCEDURE — 80053 COMPREHEN METABOLIC PANEL: CPT | Performed by: NURSE PRACTITIONER

## 2024-11-08 PROCEDURE — 93010 ELECTROCARDIOGRAM REPORT: CPT | Mod: ,,, | Performed by: INTERNAL MEDICINE

## 2024-11-08 PROCEDURE — 97530 THERAPEUTIC ACTIVITIES: CPT

## 2024-11-08 PROCEDURE — 94640 AIRWAY INHALATION TREATMENT: CPT

## 2024-11-08 RX ADMIN — BUDESONIDE INHALATION 0.5 MG: 0.5 SUSPENSION RESPIRATORY (INHALATION) at 07:11

## 2024-11-08 RX ADMIN — SENNOSIDES AND DOCUSATE SODIUM 1 TABLET: 50; 8.6 TABLET ORAL at 08:11

## 2024-11-08 RX ADMIN — EMPAGLIFLOZIN 10 MG: 10 TABLET, FILM COATED ORAL at 09:11

## 2024-11-08 RX ADMIN — ATORVASTATIN CALCIUM 80 MG: 40 TABLET, FILM COATED ORAL at 08:11

## 2024-11-08 RX ADMIN — ASPIRIN 81 MG: 81 TABLET, COATED ORAL at 09:11

## 2024-11-08 RX ADMIN — FAMOTIDINE 20 MG: 20 TABLET ORAL at 09:11

## 2024-11-08 RX ADMIN — ARFORMOTEROL TARTRATE 15 MCG: 15 SOLUTION RESPIRATORY (INHALATION) at 08:11

## 2024-11-08 RX ADMIN — FAMOTIDINE 20 MG: 20 TABLET ORAL at 08:11

## 2024-11-08 RX ADMIN — RIVAROXABAN 20 MG: 20 TABLET, FILM COATED ORAL at 05:11

## 2024-11-08 RX ADMIN — AMIODARONE HYDROCHLORIDE 200 MG: 200 TABLET ORAL at 09:11

## 2024-11-08 RX ADMIN — ARFORMOTEROL TARTRATE 15 MCG: 15 SOLUTION RESPIRATORY (INHALATION) at 07:11

## 2024-11-08 RX ADMIN — BUDESONIDE INHALATION 0.5 MG: 0.5 SUSPENSION RESPIRATORY (INHALATION) at 08:11

## 2024-11-08 NOTE — PROGRESS NOTES
[facility] Medicine  Progress Note    Patient Name: Layton Abrams  MRN: [id number]  Patient Class: IP- Inpatient   Admission Date: 10/29/2024  Length of Stay: 9 days  Attending Physician: David Gunderson DO  Primary Care Provider: Nellie Cadena MD        Subjective:     Principal Problem:Atrial fibrillation with RVR        HPI:  The patient is 68-year-old male with a history of atrial fibrillation, coronary artery disease with stenting, hypertension, stroke with right-sided deficits, COPD, HCV, left peroneal DVT, Xarelto use, and lower extremity cellulitis presented to [facility] Emergency Department on October 29 with weakness that began on the morning of presentation.  By report he had been feeling well the day prior but did not feel well on October 29.  In the emergency department he had AFib with RVR and hypotension, but oxygenation was stable on room air.  Patient was drowsy but would wake and speak without difficulty.  He reported no chest pain or shortness of breath.  Labs were concerning for CARINA with  and creatinine 5.38.  Potassium was 7.  High sensitivity troponin was in the normal range at 40.  Chest x-ray had clear lungs but did show marked cardiomegaly.  ED attempted cardioversion with the AFib with RVR, but the patient did not cardiovert.  By report, EKGs did not show evidence of significant peak T-waves. He received calcium, dextrose/insulin, lactated Ringer's, IV metoprolol, IV bicarbonate, and Lokelma.  Levophed infusion was initiated.  Blood pressure improved with the Levophed, but tachycardia persisted.  Patient has a PICC line in place.  He also had a Wilcox catheter placed and had a small amount of urine with catheter placement.  Case discussed with the ED doctor and with Critical Care Medicine at [facility].  Patient transfer to [facility] and admitted to the ICU. He was placed on bicarb and amiodarone infusions.         Overview/Hospital Course:  The patient presented with weakness that began on the morning of presentation. By report he had been feeling well the day prior but did not feel well on October 29. In the emergency department he had AFib with RVR and hypotension, but oxygenation was stable on room air. Patient was drowsy but would wake and speak without difficulty. He reported no chest pain or shortness of breath. Labs were concerning for CARINA with  and creatinine 5.38. Potassium was 7. High sensitivity troponin was in the normal range at 40. Chest x-ray had clear lungs but did show marked cardiomegaly. ED attempted cardioversion with the AFib with RVR, but the patient did not cardiovert. By report, EKGs did not show evidence of significant peak T-waves. He received calcium, dextrose/insulin, lactated Ringer's, IV metoprolol, IV bicarbonate, and Lokelma. Levophed infusion was initiated. Blood pressure improved with the Levophed, but tachycardia persisted. Patient has a PICC line in place. He also had a Wilcox catheter placed and had a small amount of urine with catheter placement. Case discussed with the ED doctor and with Critical Care Medicine at [facility]. Patient transfer to [facility] and admitted to the ICU. He was placed on bicarb and amiodarone infusions. He was weaned off of levophed.  His renal function improved. Arterial US revealed PAD and occluded sfa. Vascular surgery was consulted, and evaluation noted plans for outpatient follow up with left leg angiogram for possible intervention and right leg intervention in the near future as well.  Plan to follow up in 2-3 weeks after medical stabilization.  Pending SNF placement    Interval History: . No acute events overnight, afebrile, hemodynamically stable.  Denies any acute concern. Pending SNF placement    Objective:     Vital Signs (Most Recent):  Temp: 97.6 °F (36.4 °C) (11/07/24 1601)  Pulse: (!) 58 (11/07/24 1921)  Resp: 18  (11/07/24 1921)  BP: 105/68 (11/07/24 1601)  SpO2: 96 % (11/07/24 1602) Vital Signs (24h Range):  Temp:  [97.6 °F (36.4 °C)-98.9 °F (37.2 °C)] 97.6 °F (36.4 °C)  Pulse:  [57-82] 58  Resp:  [16-18] 18  SpO2:  [95 %-98 %] 96 %  BP: (105-118)/(62-69) 105/68     Weight: 84.4 kg (186 lb 1.1 oz)  Body mass index is 27.48 kg/m².    Intake/Output Summary (Last 24 hours) at 11/7/2024 1939  Last data filed at 11/7/2024 0447  Gross per 24 hour   Intake 320 ml   Output --   Net 320 ml           Physical Exam  Vitals and nursing note reviewed.   Constitutional:       General: He is not in acute distress.     Appearance: He is ill-appearing. He is not toxic-appearing or diaphoretic.   HENT:      Head: Normocephalic and atraumatic.      Mouth/Throat:      Mouth: Mucous membranes are moist.   Eyes:      General: No scleral icterus.        Right eye: No discharge.         Left eye: No discharge.   Cardiovascular:      Rate and Rhythm: Normal rate and regular rhythm.      Heart sounds: Normal heart sounds.   Pulmonary:      Effort: Pulmonary effort is normal. No respiratory distress.   Abdominal:      General: Bowel sounds are normal.      Tenderness: There is no abdominal tenderness.   Musculoskeletal:      Cervical back: No rigidity.      Right lower leg: No edema.      Left lower leg: No edema.   Skin:     General: Skin is warm and dry.      Coloration: Skin is not jaundiced.   Neurological:      Mental Status: He is alert and oriented to person, place, and time. Mental status is at baseline.   Psychiatric:         Mood and Affect: Mood normal.         Behavior: Behavior normal.             Significant Labs: All pertinent labs within the past 24 hours have been reviewed.  LABS:  Recent Labs   Lab 11/05/24  0432 11/06/24  0529 11/07/24  0600    135* 138   K 5.2* 5.1 5.1    107 110   CO2 21* 21* 22*   BUN 28* 28* 26*   CREATININE 1.5* 1.4 1.4   GLU 79 77 82   ANIONGAP 9 7* 6*     Recent Labs   Lab 11/05/24  0438  11/06/24  0529 11/07/24  0600   MG 2.0 2.0 2.0   PHOS 3.5 3.8 3.2     Recent Labs   Lab 11/05/24  0432 11/06/24 0529 11/07/24  0600   AST 22 21 32   ALT 14 12 18   ALKPHOS 43 43 49   BILITOT 0.5 0.4 0.3   ALBUMIN 3.2* 3.0* 3.1*     POCT Glucose:   Recent Labs   Lab 11/05/24  1144 11/06/24  1157 11/06/24  2158   POCTGLUCOSE 94 85 89    Recent Labs   Lab 11/05/24  0432 11/06/24  0529 11/07/24  0600   WBC 7.61 5.75 5.74   HGB 10.6* 10.6* 11.0*   HCT 34.0* 33.3* 34.5*   * 140* 166   GRAN 76.8*  5.9 72.9  4.2 70.2  4.0        Micro  Blood Cultures  Lab Results   Component Value Date    LABBLOO No growth after 5 days. 10/30/2024    LABBLOO No growth after 5 days. 10/30/2024       Significant Imaging: I have reviewed all pertinent imaging results/findings within the past 24 hours.  US Lower Extrem Arteries Bilat with GEOVANNY (xpd)   Final Result      1.  Markedly abnormal study.  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  There is monophasic flow involving all vessels imaged with marked plaque formation diffusely.  There are low velocities involving the bilateral popliteal arteries and trifurcation vessels.      2.  There are elevated velocities involving both profunda femoral arteries, consistent with significant stenosis.      3.  The bilateral ABIs are unable to be measured due to noncompliance ankle vessels.         Electronically signed by: Bentley Dumont MD   Date:    11/02/2024   Time:    18:56      US Kidney   Final Result      No hydronephrosis         Electronically signed by: Umair Grant MD   Date:    10/30/2024   Time:    11:06      X-Ray Chest AP Portable   Final Result      Right-sided PICC is satisfactory         Electronically signed by: Robin Sanchez   Date:    10/29/2024   Time:    22:46          Inpatient Medications:  Continuous Infusions:  Scheduled Meds:   amiodarone  200 mg Oral Daily    arformoteroL  15 mcg Nebulization BID    aspirin  81 mg Oral Daily     "atorvastatin  80 mg Oral QHS    budesonide  0.5 mg Nebulization Q12H    empagliflozin  10 mg Oral Daily    famotidine  20 mg Oral BID    losartan  12.5 mg Oral Daily    metoprolol succinate  25 mg Oral Daily    rivaroxaban  20 mg Oral Daily with dinner    senna-docusate 8.6-50 mg  1 tablet Oral BID     PRN Meds:  Current Facility-Administered Medications:     acetaminophen, 650 mg, Oral, Q6H PRN    influenza (adjuvanted), 0.5 mL, Intramuscular, Prior to discharge    ondansetron, 8 mg, Intravenous, Q6H PRN    pneumoc 20-haylee conj-dip cr(PF), 0.5 mL, Intramuscular, vaccine x 1 dose      Assessment/Plan:      * Atrial fibrillation with RVR  Patient has paroxysmal (<7 days) atrial fibrillation. Patient is currently in sinus rhythm. [id number] Score: 1. The patients heart rate in the last 24 hours is as follows:  Pulse  Min: 57  Max: 82     Antiarrhythmics  amiodarone tablet 200 mg, Daily, Oral  metoprolol succinate (TOPROL-XL) 24 hr tablet 25 mg, Daily, Oral    Anticoagulants  rivaroxaban tablet 20 mg, With dinner, Oral    Plan  - Replete lytes with a goal of K>4, Mg >2  - Patient is anticoagulated, see medications listed above.  - Patient's afib is currently controlled  - cardiology following        PAD (peripheral artery disease)  Lower extremity altered ultrasound noted "occlusion of the left mid and distal superficial femoral arteries "    -Vascular surgery was consulted, and evaluation noted plans for outpatient follow up with left leg angiogram for possible intervention and right leg intervention in the near future as well.  Plan to follow up in 2-3 weeks after medical stabilization.      -continue aspirin    HFrEF (heart failure with reduced ejection fraction)  Patient has Systolic (HFrEF) heart failure that is Acute on chronic. On presentation their CHF was decompensated. Evidence of decompensated CHF on presentation includes: elevated JVD and shortness of breath. The etiology of their decompensation is likely " atrial fibrillation . Most recent  echo results are listed below.      Latest ECHO  Results for orders placed during the hospital encounter of 10/29/24    Echo    Interpretation Summary    Left Ventricle: The left ventricle is normal in size. Ventricular mass is normal. Mildly increased wall thickness. There is concentric remodeling. There is normal systolic function with a visually estimated ejection fraction of 60 - 65%. Unable to assess diastolic function due to atrial fibrillation.    Right Ventricle: Severe right ventricular enlargement. Wall thickness is normal. Systolic function is moderately reduced.    Right Atrium: Right atrium is severely dilated.    Tricuspid Valve: There is severe regurgitation.    Pulmonic Valve: There is mild regurgitation.    Pulmonary Artery: There is moderate pulmonary hypertension. The estimated pulmonary artery systolic pressure is 59 mmHg.    IVC/SVC: Elevated venous pressure at 15 mmHg.    Current Heart Failure Medications  metoprolol succinate (TOPROL-XL) 24 hr tablet 25 mg, Daily, Oral  empagliflozin (Jardiance) tablet 10 mg, Daily, Oral  losartan split tablet 12.5 mg, Daily, Oral    Plan  - Monitor strict I&Os and daily weights.    - Place on telemetry  - Low sodium diet  - Cardiology has been consulted  - The patient's volume status is improving but not at their baseline as indicated by elevated JVD            Panlobular emphysema  Patient's COPD is controlled currently.  Patient is currently off COPD Pathway. Continue scheduled inhalers Supplemental oxygen and monitor respiratory status closely.     Hyperkalemia  Hyperkalemia is likely due to CARINA.The patients most recent potassium results are listed below.  Recent Labs     11/05/24  0432 11/06/24  0529 11/07/24  0600   K 5.2* 5.1 5.1       Plan  - Monitor for arrhythmias with EKG and/or continuous telemetry.               CARINA (acute kidney injury)  CARINA is likely due to  multiple factors, HF, afib, hypotension . Baseline  creatinine is  0.7-1 . Most recent creatinine and eGFR are listed below.  Recent Labs     11/05/24  0432 11/06/24  0529 11/07/24  0600   CREATININE 1.5* 1.4 1.4   EGFRNORACEVR 51* 55* 55*        Plan  - CARINA is improving  - Avoid nephrotoxins and renally dose meds for GFR listed above  - Monitor urine output, serial BMP, and adjust therapy as needed  - appreciate nephrology      VTE Risk Mitigation (From admission, onward)           Ordered     rivaroxaban tablet 20 mg  With dinner         11/05/24 1000                    Discharge Planning   WIL: 11/7/2024     Code Status: Full Code   Is the patient medically ready for discharge?:     Reason for patient still in hospital (select all that apply): Pending disposition  Discharge Plan A: Skilled Nursing Facility   Discharge Delays: None known at this time      David Gunderson DO  Department of Hospital Medicine   [facility] - Telemetry ([facility])    Voice recognition software was used in the creation of this note/communication and any sound-alike/typographical errors which may have occurred despite initial review prior to signing should be taken in context when interpreting.  If such errors prevent a clear understanding of the note/communication, please contact the provider/office for clarification.

## 2024-11-08 NOTE — ASSESSMENT & PLAN NOTE
Patient has paroxysmal (<7 days) atrial fibrillation. Patient is currently in sinus rhythm. [id number] Score: 1. The patients heart rate in the last 24 hours is as follows:  Pulse  Min: 57  Max: 82     Antiarrhythmics  amiodarone tablet 200 mg, Daily, Oral  metoprolol succinate (TOPROL-XL) 24 hr tablet 25 mg, Daily, Oral    Anticoagulants  rivaroxaban tablet 20 mg, With dinner, Oral    Plan  - Replete lytes with a goal of K>4, Mg >2  - Patient is anticoagulated, see medications listed above.  - Patient's afib is currently controlled  - cardiology following

## 2024-11-08 NOTE — PROGRESS NOTES
[facility])  Wound Care    Patient Name:  Layton Abrams   MRN:  [id number]  Date: 11/8/2024  Diagnosis: Atrial fibrillation with RVR    History:     Past Medical History:   Diagnosis Date    Arthritis     CHF (congestive heart failure)     COPD (chronic obstructive pulmonary disease)     Hypertension     Liver disease     Stroke        Social History     Socioeconomic History    Marital status: Unknown   Tobacco Use    Smoking status: Former     Current packs/day: 0.50     Types: Cigarettes    Smokeless tobacco: Never    Tobacco comments:     Quit 2010   Substance and Sexual Activity    Alcohol use: Not Currently    Drug use: Not Currently     Social Drivers of Health     Financial Resource Strain: Low Risk  (10/29/2024)    Overall Financial Resource Strain (CARDIA)     Difficulty of Paying Living Expenses: Not very hard   Food Insecurity: No Food Insecurity (10/29/2024)    Hunger Vital Sign     Worried About Running Out of Food in the Last Year: Never true     Ran Out of Food in the Last Year: Never true   Transportation Needs: No Transportation Needs (10/29/2024)    TRANSPORTATION NEEDS     Transportation : No   Stress: No Stress Concern Present (10/29/2024)    [employer] - Occupational Stress Questionnaire     Feeling of Stress : Only a little   Housing Stability: Low Risk  (10/29/2024)    Housing Stability Vital Sign     Unable to Pay for Housing in the Last Year: No     Homeless in the Last Year: No       Precautions:     Allergies as of 10/29/2024    (No Known Allergies)       WO Assessment Details/Treatment     F/U with Mr. Abrams for ongoing wound care management for present on admission altered skin integrity. Patient still on the telemetry floor. Patient awake alert and agreeable to treatment. Primary nurse at bedside administering morning medications.   Heel foams removed from bilateral heels, Right heel skin intact. Left heel again noted to have DTI to  the medial aspect and an ulceration to the lateral aspect that appears to have callused over. Medial DTI maroon, purple intact skin no drainage noted. Cleansed with vashe and applied cavilon. Lateral ulceration yellow/maroon dry callused with no drainage noted. Cleansed with vashe, patted dry and painted priwound with cavilon. Placed new heel foams to bilateral heels. Heel offloading boots placed back on the patient and educated patient on the importance of wearing them while in bed.   Foam dressing removed from left lateral lower leg, ulceration again noted. Moist pink/red wound bed with scant amount of serosanguinous drainage. Cleansed with vashe, patted dry, painted with cavilon, and reapplied foam dressing. Continue wound care as ordered.   Right groin partial thickness tissue loss has resolved skin is intact, area still very moist recommend continuing with wound care ordered for moisture management to right and left groin.   Foam dressing removed from lumbar spine, abscess again noted now has an area of red moist hypergranulation tissue noted. Redness and maroon discoloration around the area has improved, still no warmth noted. scant amount of serosanguinous drainage noted. Cleansed with saline, patted dry, applied silver nitrate to hypergranulation tissue and then applied new foam dressing. Continue with wound care as ordered. Sacrum noted to have pink scar tissue but no noted breakdown, cleansed with saline and applied moisture barrier paste. Recommend patient not wear diapers and just utilize moisture wicking blue pad between the legs for incontinence.   Continue Pressure injury preventions to include: turn Q2 hours, heel offloading, and moisture management.          11/08/24 0930   WOCN Assessment   WOCN Total Time (mins) 45   Visit Date 11/08/24   Visit Time 0930   Consult Type Follow Up   WOCN Speciality Wound   Wound deep tissue injury;moisture;At risk for pressure Injury   Intervention  assessed;changed;applied;chart review;orders;coordination of care   Teaching on-going        Wound 10/29/24 2204 Ulceration Left lateral Heel   Date First Assessed/Time First Assessed: 10/29/24 2204   Present on Original Admission: Yes  Primary Wound Type: Ulceration  Side: Left  Orientation: lateral  Location: Heel  Is this injury device related?: No   Wound Image    Dressing Appearance Clean;Dry;Intact   Drainage Amount None   Appearance Yellow;Dry;Closed/resurfaced   Periwound Area Dry   Wound Edges Callused   Wound Length (cm) 1 cm   Wound Width (cm) 0.3 cm   Wound Surface Area (cm^2) 0.3 cm^2   Care Cleansed with:;Wound cleanser;Applied:;Skin Barrier   Dressing Applied;Foam   Periwound Care Skin barrier film applied   Dressing Change Due 11/12/24        Wound 10/29/24 2206 Moisture associated dermatitis Right anterior Groin   Date First Assessed/Time First Assessed: 10/29/24 2206   Present on Original Admission: Yes  Primary Wound Type: Moisture associated dermatitis  Side: (c) Right  Orientation: anterior  Location: Groin  Is this injury device related?: No   Wound Image    Dressing Appearance Clean;Dry;Intact;Open to air   Drainage Amount None   Appearance Intact;[place];Moist   Care Cleansed with:;Soap and water   Dressing Applied;Other (comment)  (orange top barrier paste)   Dressing Change Due 11/09/24        Wound 10/29/24 2208 Moisture associated dermatitis Left anterior Groin   Date First Assessed/Time First Assessed: 10/29/24 2208   Present on Original Admission: Yes  Primary Wound Type: Moisture associated dermatitis  Side: Left  Orientation: anterior  Location: Groin  Is this injury device related?: No   Dressing Appearance Clean;Dry;Intact;Open to air   Drainage Amount None   Appearance Pink;Intact   Care Cleansed with:;Soap and water   Dressing Applied;Other (comment)  (orange top barrier paste)   Dressing Change Due 11/09/24        Wound 10/30/24 1000 Pressure Injury Left medial Heel   Date First  Assessed/Time First Assessed: 10/30/24 1000   Present on Original Admission: Yes  Primary Wound Type: Pressure Injury  Side: Left  Orientation: medial  Location: Heel   Wound Image    Pressure Injury Stage DTPI   Dressing Appearance Dry;Intact;Clean   Drainage Amount None   Appearance Maroon;Purple;Dry   Periwound Area Dry   Wound Edges Callused   Care Cleansed with:;Wound cleanser;Applied:;Skin Barrier   Dressing Changed;Foam   Periwound Care Skin barrier film applied   Dressing Change Due 11/12/24        Wound 10/30/24 1000 Abscess Lumbar spine   Date First Assessed/Time First Assessed: 10/30/24 1000   Present on Original Admission: Yes  Primary Wound Type: Abscess  Location: Lumbar spine   Wound Image    Dressing Appearance Moist drainage   Drainage Amount Scant   Drainage Characteristics/Odor Serosanguineous   Appearance Red;Maroon;Purple;Hypergranulation   Tissue loss description Partial thickness   Red (%), Wound Tissue Color 100 %   Periwound Area Dry;Maroon;Purple   Wound Length (cm) 2 cm   Wound Width (cm) 1.6 cm   Wound Depth (cm) 0.1 cm   Wound Volume (cm^3) 0.32 cm^3   Wound Surface Area (cm^2) 3.2 cm^2   Care Cleansed with:;Wound cleanser   Dressing Changed;Foam  (applied silvernitrate to hypergranulated tissue)   Dressing Change Due 11/12/24        Wound 10/30/24 1000 Ulceration Left lateral;lower Leg   Date First Assessed/Time First Assessed: 10/30/24 1000   Present on Original Admission: Yes  Primary Wound Type: Ulceration  Side: Left  Orientation: lateral;lower  Location: Leg   Wound Image    Dressing Appearance Moist drainage   Drainage Amount Scant   Drainage Characteristics/Odor Serosanguineous   Appearance Pink;Red;Moist   Tissue loss description Full thickness   Red (%), Wound Tissue Color 100 %   Periwound Area Dry   Wound Edges Callused   Wound Length (cm) 2 cm   Wound Width (cm) 1 cm   Wound Depth (cm) 0.1 cm   Wound Volume (cm^3) 0.2 cm^3   Wound Surface Area (cm^2) 2 cm^2   Care Cleansed  with:;Wound cleanser;Applied:;Skin Barrier   Dressing Applied;Foam   Dressing Change Due 11/12/24 11/08/2024

## 2024-11-08 NOTE — PLAN OF CARE
SW called to room to speak with pt and brother, Bijan, regarding status of placement. Pt's brother [facility]. SW discussed rehab criteria and explained that at this time, patient is not appropriate for inpatient rehab hospitalization d/t diagnosis and therapy recommendations. SW discussed pending auth with [facility]; anticipated DC is Monday, if auth not received today.   SW to follow

## 2024-11-08 NOTE — PLAN OF CARE
Problem: Adult Inpatient Plan of Care  Goal: Plan of Care Review  Outcome: Progressing  Goal: Patient-Specific Goal (Individualized)  Outcome: Progressing  Goal: Absence of Hospital-Acquired Illness or Injury  Outcome: Progressing  Goal: Optimal Comfort and Wellbeing  Outcome: Progressing  Goal: Readiness for Transition of Care  Outcome: Progressing     Problem: Wound  Goal: Optimal Coping  Outcome: Progressing  Goal: Optimal Functional Ability  Outcome: Progressing  Goal: Absence of Infection Signs and Symptoms  Outcome: Progressing  Goal: Improved Oral Intake  Outcome: Progressing  Goal: Optimal Pain Control and Function  Outcome: Progressing  Goal: Skin Health and Integrity  Outcome: Progressing  Goal: Optimal Wound Healing  Outcome: Progressing     Problem: Infection  Goal: Absence of Infection Signs and Symptoms  Outcome: Progressing     Problem: Acute Kidney Injury/Impairment  Goal: Fluid and Electrolyte Balance  Outcome: Progressing  Goal: Improved Oral Intake  Outcome: Progressing  Goal: Effective Renal Function  Outcome: Progressing     Problem: Skin Injury Risk Increased  Goal: Skin Health and Integrity  Outcome: Progressing     Problem: Fall Injury Risk  Goal: Absence of Fall and Fall-Related Injury  Outcome: Progressing     Problem: Fatigue  Goal: Improved Activity Tolerance  Outcome: Progressing     Problem: [place]  Goal: Collaboration with the Healthcare Team  Outcome: Progressing

## 2024-11-08 NOTE — PT/OT/SLP PROGRESS
Physical Therapy  Treatment    Layton Abrams   MRN: [id number]   Admitting Diagnosis: Atrial fibrillation with RVR    PT Received On: 11/08/24  PT Start Time: 1100     PT Stop Time: 1115    PT Total Time (min): 15 min       Billable Minutes:  Therapeutic Activity 15    Treatment Type: Treatment  PT/PTA: PTA     Number of PTA visits since last PT visit: 1       General Precautions: Standard, fall  Orthopedic Precautions: N/A  Braces: N/A  Respiratory Status: Room air    Spiritual, Cultural Beliefs, Holiness Practices, Values that Affect Care: no    Subjective:  Communicated with patient's nurse, Bina, and completed Epic chart review prior to session.  Patient agreed to chair T/F only. Declined gait training at this time due to foot pain.     Pain/Comfort  Pain Rating 1: 5/10  Location - Side 1: Bilateral  Location 1: foot  Pain Addressed 1: Other (see comments) (ACTIVITY PACING)  Pain Rating Post-Intervention 1: 5/10    Objective:   Patient found with: peripheral IV, telemetry, bed alarm, pressure relief boots    Supine > sit EOB: CGA    Forward scoot towards EOB: CGA    STS from EOB > RW: Min A (VC for hand placement)    Stand pivot T/F from EOB > chair w/ RW: (max VC for safety w/ RW mgmt and sequencing of task)    Reviewed AROM TE to BLE including: hip flex/ext, knee flex/ext, ankle PF/DF  To be completed a minimum of 10 reps for each LE in order to promote return of function, strength and ROM.     Educated patient on importance of increased tolerance to upright position and direct impact on CV endurance and strength. Patient encouraged to sit up in chair/ EOB, for a minimum of 2 consecutive hours, 3x per day. Encouraged patient to perform AROM TE to BLE throughout the day within all available planes of motion. Re enforced importance of utilizing call light to meet needs in room and not attempt to get up without staff assistance. Patient verbalized understanding and agreed to comply.     AM-PAC 6 CLICK  MOBILITY  How much help from another person does this patient currently need?   1 = Unable, Total/Dependent Assistance  2 = A lot, Maximum/Moderate Assistance  3 = A little, Minimum/Contact Guard/Supervision  4 = None, Modified [place]/Independent    Turning over in bed (including adjusting bedclothes, sheets and blankets)?: 3  Sitting down on and standing up from a chair with arms (e.g., wheelchair, bedside commode, etc.): 3  Moving from lying on back to sitting on the side of the bed?: 3  Moving to and from a bed to a chair (including a wheelchair)?: 3  Need to walk in hospital room?: 1 (REF)  Climbing 3-5 steps with a railing?: 1 (NT)  Basic Mobility Total Score: 14    AM-PAC Raw Score CMS G-Code Modifier Level of Impairment Assistance   6 % Total / Unable   7 - 9 CM 80 - 100% Maximal Assist   10 - 14 CL 60 - 80% Moderate Assist   15 - 19 CK 40 - 60% Moderate Assist   20 - 22 CJ 20 - 40% Minimal Assist   23 CI 1-20% SBA / CGA   24 CH 0% Independent/ Mod I     Patient left up in chair with call button in reach and chair alarm on.    Assessment:  Layton Abrams is a 68 y.o. male with a medical diagnosis of Atrial fibrillation with RVR and presents with overall decline in functional mobility. Patient would continue to benefit from skilled PT to address functional limitations listed below in order to return to PLOF/decrease caregiver burden.     Rehab identified problem list/impairments: weakness, impaired endurance, impaired functional mobility, gait instability, impaired balance, impaired cognition, decreased safety awareness, decreased lower extremity function, pain    Rehab potential is fair.    Activity tolerance: Fair    Discharge recommendations: Moderate Intensity Therapy      Barriers to discharge:      Equipment recommendations: to be determined by next level of care     GOALS:   Multidisciplinary Problems       Physical Therapy Goals          Problem: Physical Therapy    Goal Priority  Disciplines Outcome Interventions   Physical Therapy Goal     PT, PT/OT Progressing    Description: Goals to be met by 11/15/24.  1. Pt will complete bed mobility MIN A.  2. Pt will complete sit to stand MIN A.  3. Pt will ambulate 50ft MIN A using RW.  4. Pt will increase AMPAC score by 2 points to progress functional mobility.                       PLAN:    Patient to be seen 3 x/week to address the above listed problems via gait training, therapeutic activities, therapeutic exercises  Plan of Care expires: 11/15/24  Plan of Care reviewed with: patient         11/08/2024

## 2024-11-08 NOTE — ASSESSMENT & PLAN NOTE
Hyperkalemia is likely due to CARINA.The patients most recent potassium results are listed below.  Recent Labs     11/05/24  0432 11/06/24  0529 11/07/24  0600   K 5.2* 5.1 5.1       Plan  - Monitor for arrhythmias with EKG and/or continuous telemetry.

## 2024-11-08 NOTE — ASSESSMENT & PLAN NOTE
CARINA is likely due to  multiple factors, HF, afib, hypotension . Baseline creatinine is  0.7-1 . Most recent creatinine and eGFR are listed below.  Recent Labs     11/05/24  0432 11/06/24  0529 11/07/24  0600   CREATININE 1.5* 1.4 1.4   EGFRNORACEVR 51* 55* 55*        Plan  - CARINA is improving  - Avoid nephrotoxins and renally dose meds for GFR listed above  - Monitor urine output, serial BMP, and adjust therapy as needed  - appreciate nephrology

## 2024-11-08 NOTE — PT/OT/SLP PROGRESS
Occupational Therapy   Treatment    Name: Layton Abrams  MRN: [id number]  Admitting Diagnosis:  Atrial fibrillation with RVR       Recommendations:     Discharge Recommendations: Moderate Intensity Therapy  Discharge Equipment Recommendations:  to be determined by next level of care  Barriers to discharge:  Decreased caregiver support    Assessment:     Layton Abrams is a 68 y.o. male with a medical diagnosis of Atrial fibrillation with RVR.  Performance deficits affecting function are weakness, gait instability, decreased upper extremity function, decreased ROM, decreased lower extremity function, impaired balance, impaired endurance, impaired self care skills, pain, decreased safety awareness, impaired functional mobility.     Rehab Prognosis:  Good; patient would benefit from acute skilled OT services to address these deficits and reach maximum level of function.       Plan:     Patient to be seen 2 x/week to address the above listed problems via self-care/home management, therapeutic activities, therapeutic exercises  Plan of Care Expires: 11/21/24  Plan of Care Reviewed with: patient    Subjective     Chief Complaint: BLE foot pain  Patient/Family Comments/goals: Increase independence  Pain/Comfort:  Pain Rating 1: 5/10  Location - Side 1: Bilateral  Location - Orientation 1: generalized  Location 1: foot  Pain Addressed 1: Reposition, Cessation of Activity  Pain Rating Post-Intervention 1: 5/10    Objective:     Communicated with: Nurse prior to session.  Patient found supine with telemetry, peripheral IV, bed alarm, pressure relief boots upon OT entry to room.    General Precautions: Standard, fall    Orthopedic Precautions:N/A  Braces: N/A  Respiratory Status: Room air     Occupational Performance:     Bed Mobility:    Patient completed Rolling/Turning to Left with  contact guard assistance  Patient completed Scooting/Bridging with contact guard assistance  Patient completed Supine to Sit with contact  guard assistance     Functional Mobility/Transfers:  Patient completed Sit <> Stand Transfer with minimum assistance  with  rolling walker   Patient completed Bed <> Chair Transfer using Stand Pivot technique with minimum assistance with rolling walker  Functional Mobility: Declined ambulation due to fatigue and reported foot pain    [facility] 6 Click ADL: 16    Treatment & Education:  Pt declined seated exercise and ambulation due to fatigue and foot pain. Pt was, however, agreeable to transferring to bedside chair. Pt st EOB x5 minutes with SPV prior to transferring to bedside chair min A. Pt requires max verbal cues to RW safety with transfers, as well as for hand placement with transitional movements.   Pt instructed to utilize call button for nursing assistance when ready to return to bed. Pt verbalized understanding and compliance.    Patient left up in chair with all lines intact, call button in reach, and chair alarm on    GOALS:   Multidisciplinary Problems       Occupational Therapy Goals          Problem: Occupational Therapy    Goal Priority Disciplines Outcome Interventions   Occupational Therapy Goal     OT, PT/OT Progressing    Description: Goals to be met by: 11/16/24     Patient will increase functional independence with ADLs by performing:    Toileting from bedside commode with Minimal Assistance for hygiene and clothing management.   Toilet transfer to bedside commode with Minimal Assistance.  Increased functional strength in B UE to grossly WFL.                         Time Tracking:     OT Date of Treatment: 11/08/24  OT Start Time: 1100  OT Stop Time: 1115  OT Total Time (min): 15 min    Billable Minutes:Therapeutic Activity 15    MARILEE Jimenez  OT/ALFREDO: OT          11/8/2024

## 2024-11-08 NOTE — ASSESSMENT & PLAN NOTE
Patient has Systolic (HFrEF) heart failure that is Acute on chronic. On presentation their CHF was decompensated. Evidence of decompensated CHF on presentation includes: elevated JVD and shortness of breath. The etiology of their decompensation is likely atrial fibrillation . Most recent  echo results are listed below.      Latest ECHO  Results for orders placed during the hospital encounter of 10/29/24    Echo    Interpretation Summary    Left Ventricle: The left ventricle is normal in size. Ventricular mass is normal. Mildly increased wall thickness. There is concentric remodeling. There is normal systolic function with a visually estimated ejection fraction of 60 - 65%. Unable to assess diastolic function due to atrial fibrillation.    Right Ventricle: Severe right ventricular enlargement. Wall thickness is normal. Systolic function is moderately reduced.    Right Atrium: Right atrium is severely dilated.    Tricuspid Valve: There is severe regurgitation.    Pulmonic Valve: There is mild regurgitation.    Pulmonary Artery: There is moderate pulmonary hypertension. The estimated pulmonary artery systolic pressure is 59 mmHg.    IVC/SVC: Elevated venous pressure at 15 mmHg.    Current Heart Failure Medications  metoprolol succinate (TOPROL-XL) 24 hr tablet 25 mg, Daily, Oral  empagliflozin (Jardiance) tablet 10 mg, Daily, Oral  losartan split tablet 12.5 mg, Daily, Oral    Plan  - Monitor strict I&Os and daily weights.    - Place on telemetry  - Low sodium diet  - Cardiology has been consulted  - The patient's volume status is improving but not at their baseline as indicated by elevated JVD

## 2024-11-08 NOTE — SUBJECTIVE & OBJECTIVE
Interval History: . No acute events overnight, afebrile, hemodynamically stable.  Denies any acute concern. Pending SNF placement    Objective:     Vital Signs (Most Recent):  Temp: 97.6 °F (36.4 °C) (11/07/24 1601)  Pulse: (!) 58 (11/07/24 1921)  Resp: 18 (11/07/24 1921)  BP: 105/68 (11/07/24 1601)  SpO2: 96 % (11/07/24 1602) Vital Signs (24h Range):  Temp:  [97.6 °F (36.4 °C)-98.9 °F (37.2 °C)] 97.6 °F (36.4 °C)  Pulse:  [57-82] 58  Resp:  [16-18] 18  SpO2:  [95 %-98 %] 96 %  BP: (105-118)/(62-69) 105/68     Weight: 84.4 kg (186 lb 1.1 oz)  Body mass index is 27.48 kg/m².    Intake/Output Summary (Last 24 hours) at 11/7/2024 1939  Last data filed at 11/7/2024 0447  Gross per 24 hour   Intake 320 ml   Output --   Net 320 ml           Physical Exam  Vitals and nursing note reviewed.   Constitutional:       General: He is not in acute distress.     Appearance: He is ill-appearing. He is not toxic-appearing or diaphoretic.   HENT:      Head: Normocephalic and atraumatic.      Mouth/Throat:      Mouth: Mucous membranes are moist.   Eyes:      General: No scleral icterus.        Right eye: No discharge.         Left eye: No discharge.   Cardiovascular:      Rate and Rhythm: Normal rate and regular rhythm.      Heart sounds: Normal heart sounds.   Pulmonary:      Effort: Pulmonary effort is normal. No respiratory distress.   Abdominal:      General: Bowel sounds are normal.      Tenderness: There is no abdominal tenderness.   Musculoskeletal:      Cervical back: No rigidity.      Right lower leg: No edema.      Left lower leg: No edema.   Skin:     General: Skin is warm and dry.      Coloration: Skin is not jaundiced.   Neurological:      Mental Status: He is alert and oriented to person, place, and time. Mental status is at baseline.   Psychiatric:         Mood and Affect: Mood normal.         Behavior: Behavior normal.             Significant Labs: All pertinent labs within the past 24 hours have been  reviewed.  LABS:  Recent Labs   Lab 11/05/24 0432 11/06/24  0529 11/07/24  0600    135* 138   K 5.2* 5.1 5.1    107 110   CO2 21* 21* 22*   BUN 28* 28* 26*   CREATININE 1.5* 1.4 1.4   GLU 79 77 82   ANIONGAP 9 7* 6*     Recent Labs   Lab 11/05/24 0432 11/06/24 0529 11/07/24  0600   MG 2.0 2.0 2.0   PHOS 3.5 3.8 3.2     Recent Labs   Lab 11/05/24 0432 11/06/24  0529 11/07/24  0600   AST 22 21 32   ALT 14 12 18   ALKPHOS 43 43 49   BILITOT 0.5 0.4 0.3   ALBUMIN 3.2* 3.0* 3.1*     POCT Glucose:   Recent Labs   Lab 11/05/24  1144 11/06/24  1157 11/06/24  2158   POCTGLUCOSE 94 85 89    Recent Labs   Lab 11/05/24 0432 11/06/24  0529 11/07/24  0600   WBC 7.61 5.75 5.74   HGB 10.6* 10.6* 11.0*   HCT 34.0* 33.3* 34.5*   * 140* 166   GRAN 76.8*  5.9 72.9  4.2 70.2  4.0        Micro  Blood Cultures  Lab Results   Component Value Date    LABBLOO No growth after 5 days. 10/30/2024    LABBLOO No growth after 5 days. 10/30/2024       Significant Imaging: I have reviewed all pertinent imaging results/findings within the past 24 hours.  US Lower Extrem Arteries Bilat with GEOVANNY (xpd)   Final Result      1.  Markedly abnormal study.  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  There is monophasic flow involving all vessels imaged with marked plaque formation diffusely.  There are low velocities involving the bilateral popliteal arteries and trifurcation vessels.      2.  There are elevated velocities involving both profunda femoral arteries, consistent with significant stenosis.      3.  The bilateral ABIs are unable to be measured due to noncompliance ankle vessels.         Electronically signed by: Bentley Dumont MD   Date:    11/02/2024   Time:    18:56      US Kidney   Final Result      No hydronephrosis         Electronically signed by: Umair Grant MD   Date:    10/30/2024   Time:    11:06      X-Ray Chest AP Portable   Final Result      Right-sided PICC is  satisfactory         Electronically signed by: Robin Sanchez   Date:    10/29/2024   Time:    22:46          Inpatient Medications:  Continuous Infusions:  Scheduled Meds:   amiodarone  200 mg Oral Daily    arformoteroL  15 mcg Nebulization BID    aspirin  81 mg Oral Daily    atorvastatin  80 mg Oral QHS    budesonide  0.5 mg Nebulization Q12H    empagliflozin  10 mg Oral Daily    famotidine  20 mg Oral BID    losartan  12.5 mg Oral Daily    metoprolol succinate  25 mg Oral Daily    rivaroxaban  20 mg Oral Daily with dinner    senna-docusate 8.6-50 mg  1 tablet Oral BID     PRN Meds:  Current Facility-Administered Medications:     acetaminophen, 650 mg, Oral, Q6H PRN    influenza (adjuvanted), 0.5 mL, Intramuscular, Prior to discharge    ondansetron, 8 mg, Intravenous, Q6H PRN    pneumoc 20-haylee conj-dip cr(PF), 0.5 mL, Intramuscular, vaccine x 1 dose

## 2024-11-08 NOTE — PLAN OF CARE
Encouragement needed initially to participate.  Bed mobility CGA  Seated scoot CGA  Sit to stand with RW min A  Stand pivot transfer to bedside chair with RW min A  Max verbal cues needed for safety with RW use and hand placement    Recommend moderate intensity therapy at DC

## 2024-11-09 LAB
ALBUMIN SERPL BCP-MCNC: 3.3 G/DL (ref 3.5–5.2)
ALP SERPL-CCNC: 47 U/L (ref 40–150)
ALT SERPL W/O P-5'-P-CCNC: 18 U/L (ref 10–44)
ANION GAP SERPL CALC-SCNC: 9 MMOL/L (ref 8–16)
AST SERPL-CCNC: 23 U/L (ref 10–40)
BASOPHILS # BLD AUTO: 0.02 K/UL (ref 0–0.2)
BASOPHILS NFR BLD: 0.4 % (ref 0–1.9)
BILIRUB SERPL-MCNC: 0.3 MG/DL (ref 0.1–1)
BUN SERPL-MCNC: 29 MG/DL (ref 8–23)
CALCIUM SERPL-MCNC: 8.8 MG/DL (ref 8.7–10.5)
CHLORIDE SERPL-SCNC: 111 MMOL/L (ref 95–110)
CO2 SERPL-SCNC: 17 MMOL/L (ref 23–29)
CREAT SERPL-MCNC: 1.3 MG/DL (ref 0.5–1.4)
DIFFERENTIAL METHOD BLD: ABNORMAL
EOSINOPHIL # BLD AUTO: 0.2 K/UL (ref 0–0.5)
EOSINOPHIL NFR BLD: 4.2 % (ref 0–8)
ERYTHROCYTE [DISTWIDTH] IN BLOOD BY AUTOMATED COUNT: 13.8 % (ref 11.5–14.5)
EST. GFR  (NO RACE VARIABLE): >60 ML/MIN/1.73 M^2
GLUCOSE SERPL-MCNC: 95 MG/DL (ref 70–110)
HCT VFR BLD AUTO: 35.8 % (ref 40–54)
HGB BLD-MCNC: 11.4 G/DL (ref 14–18)
IMM GRANULOCYTES # BLD AUTO: 0.01 K/UL (ref 0–0.04)
IMM GRANULOCYTES NFR BLD AUTO: 0.2 % (ref 0–0.5)
LYMPHOCYTES # BLD AUTO: 1 K/UL (ref 1–4.8)
LYMPHOCYTES NFR BLD: 20.6 % (ref 18–48)
MAGNESIUM SERPL-MCNC: 1.9 MG/DL (ref 1.6–2.6)
MCH RBC QN AUTO: 27.9 PG (ref 27–31)
MCHC RBC AUTO-ENTMCNC: 31.8 G/DL (ref 32–36)
MCV RBC AUTO: 88 FL (ref 82–98)
MONOCYTES # BLD AUTO: 0.4 K/UL (ref 0.3–1)
MONOCYTES NFR BLD: 8.1 % (ref 4–15)
NEUTROPHILS # BLD AUTO: 3.4 K/UL (ref 1.8–7.7)
NEUTROPHILS NFR BLD: 66.5 % (ref 38–73)
NRBC BLD-RTO: 0 /100 WBC
OHS QRS DURATION: 132 MS
OHS QTC CALCULATION: 446 MS
PHOSPHATE SERPL-MCNC: 3.3 MG/DL (ref 2.7–4.5)
PLATELET # BLD AUTO: 180 K/UL (ref 150–450)
PMV BLD AUTO: 9.2 FL (ref 9.2–12.9)
POTASSIUM SERPL-SCNC: 4.7 MMOL/L (ref 3.5–5.1)
PROT SERPL-MCNC: 6.8 G/DL (ref 6–8.4)
RBC # BLD AUTO: 4.09 M/UL (ref 4.6–6.2)
SODIUM SERPL-SCNC: 137 MMOL/L (ref 136–145)
WBC # BLD AUTO: 5.04 K/UL (ref 3.9–12.7)

## 2024-11-09 PROCEDURE — 84100 ASSAY OF PHOSPHORUS: CPT | Performed by: NURSE PRACTITIONER

## 2024-11-09 PROCEDURE — 25000242 PHARM REV CODE 250 ALT 637 W/ HCPCS: Performed by: INTERNAL MEDICINE

## 2024-11-09 PROCEDURE — 25000003 PHARM REV CODE 250: Performed by: STUDENT IN AN ORGANIZED HEALTH CARE EDUCATION/TRAINING PROGRAM

## 2024-11-09 PROCEDURE — 36415 COLL VENOUS BLD VENIPUNCTURE: CPT | Performed by: NURSE PRACTITIONER

## 2024-11-09 PROCEDURE — 83735 ASSAY OF MAGNESIUM: CPT | Performed by: NURSE PRACTITIONER

## 2024-11-09 PROCEDURE — 85025 COMPLETE CBC W/AUTO DIFF WBC: CPT | Performed by: NURSE PRACTITIONER

## 2024-11-09 PROCEDURE — 80053 COMPREHEN METABOLIC PANEL: CPT | Performed by: NURSE PRACTITIONER

## 2024-11-09 PROCEDURE — 25000003 PHARM REV CODE 250: Performed by: NURSE PRACTITIONER

## 2024-11-09 PROCEDURE — 94761 N-INVAS EAR/PLS OXIMETRY MLT: CPT

## 2024-11-09 PROCEDURE — 94640 AIRWAY INHALATION TREATMENT: CPT

## 2024-11-09 PROCEDURE — 97530 THERAPEUTIC ACTIVITIES: CPT | Mod: CQ

## 2024-11-09 PROCEDURE — 25000003 PHARM REV CODE 250: Performed by: INTERNAL MEDICINE

## 2024-11-09 PROCEDURE — 97116 GAIT TRAINING THERAPY: CPT | Mod: CQ

## 2024-11-09 PROCEDURE — 21400001 HC TELEMETRY ROOM

## 2024-11-09 PROCEDURE — 25000242 PHARM REV CODE 250 ALT 637 W/ HCPCS: Performed by: NURSE PRACTITIONER

## 2024-11-09 PROCEDURE — 25000003 PHARM REV CODE 250: Performed by: SPECIALIST

## 2024-11-09 RX ADMIN — LOSARTAN POTASSIUM 12.5 MG: 25 TABLET, FILM COATED ORAL at 10:11

## 2024-11-09 RX ADMIN — SENNOSIDES AND DOCUSATE SODIUM 1 TABLET: 50; 8.6 TABLET ORAL at 09:11

## 2024-11-09 RX ADMIN — ATORVASTATIN CALCIUM 80 MG: 40 TABLET, FILM COATED ORAL at 09:11

## 2024-11-09 RX ADMIN — FAMOTIDINE 20 MG: 20 TABLET ORAL at 10:11

## 2024-11-09 RX ADMIN — ASPIRIN 81 MG: 81 TABLET, COATED ORAL at 10:11

## 2024-11-09 RX ADMIN — SENNOSIDES AND DOCUSATE SODIUM 1 TABLET: 50; 8.6 TABLET ORAL at 10:11

## 2024-11-09 RX ADMIN — RIVAROXABAN 20 MG: 20 TABLET, FILM COATED ORAL at 04:11

## 2024-11-09 RX ADMIN — ARFORMOTEROL TARTRATE 15 MCG: 15 SOLUTION RESPIRATORY (INHALATION) at 08:11

## 2024-11-09 RX ADMIN — BUDESONIDE INHALATION 0.5 MG: 0.5 SUSPENSION RESPIRATORY (INHALATION) at 08:11

## 2024-11-09 RX ADMIN — EMPAGLIFLOZIN 10 MG: 10 TABLET, FILM COATED ORAL at 10:11

## 2024-11-09 RX ADMIN — AMIODARONE HYDROCHLORIDE 200 MG: 200 TABLET ORAL at 10:11

## 2024-11-09 RX ADMIN — FAMOTIDINE 20 MG: 20 TABLET ORAL at 09:11

## 2024-11-09 NOTE — ASSESSMENT & PLAN NOTE
CARINA is likely due to  multiple factors, HF, afib, hypotension . Baseline creatinine is  0.7-1 . Most recent creatinine and eGFR are listed below.  Recent Labs     11/06/24  0529 11/07/24  0600 11/08/24  0439   CREATININE 1.4 1.4 1.4   EGFRNORACEVR 55* 55* 55*        Plan  - CARINA is improving  - Avoid nephrotoxins and renally dose meds for GFR listed above  - Monitor urine output, serial BMP, and adjust therapy as needed  - appreciate nephrology

## 2024-11-09 NOTE — PLAN OF CARE
""I DON'T WANT TO WALK"     BED MOB SBA    SCOOT TO EOB SBA WITH GOOD UPPER EXTREMITY STRENGTH NOTED.     SIT<-->STAND CG WITH VC FOR UPPER EXTREMITY PLACEMENT    REFUSED TO WALK WITH RW BUT AGREED TO T/F TO CHAIR WITH STAND PIVOT TECHNIQUE WITH RW. BEFORE SITTING, PATIENT AMBULATED 2 STEPS FORWARD/BACKWARD WITH VC FOR TECHNIQUE.     PATIENT NEEDS MAX ENCOURAGEMENT AND DEMONSTRATES DECREASED MOTIVATION HE HAS THE ABILITY BUT IS NOT WILLING TO PUT FORTH SUFFICIENT EFFORT TO PERFORM MORE ACTIVITY.   "

## 2024-11-09 NOTE — ASSESSMENT & PLAN NOTE
Patient has paroxysmal (<7 days) atrial fibrillation. Patient is currently in sinus rhythm. [id number] Score: 1. The patients heart rate in the last 24 hours is as follows:  Pulse  Min: 42  Max: 62     Antiarrhythmics  amiodarone tablet 200 mg, Daily, Oral  metoprolol succinate (TOPROL-XL) 24 hr tablet 25 mg, Daily, Oral    Anticoagulants  rivaroxaban tablet 20 mg, With dinner, Oral    Plan  - Replete lytes with a goal of K>4, Mg >2  - Patient is anticoagulated, see medications listed above.  - Patient's afib is currently controlled  - cardiology following

## 2024-11-09 NOTE — ASSESSMENT & PLAN NOTE
Hyperkalemia is likely due to CARINA.The patients most recent potassium results are listed below.  Recent Labs     11/06/24  0529 11/07/24  0600 11/08/24  0439   K 5.1 5.1 4.9       Plan  - Monitor for arrhythmias with EKG and/or continuous telemetry.

## 2024-11-09 NOTE — PROGRESS NOTES
[facility] Medicine  Progress Note    Patient Name: Layton Abrams  MRN: [id number]  Patient Class: IP- Inpatient   Admission Date: 10/29/2024  Length of Stay: 10 days  Attending Physician: David Gunderson DO  Primary Care Provider: Nellie Cadena MD        Subjective:     Principal Problem:Atrial fibrillation with RVR        HPI:  The patient is 68-year-old male with a history of atrial fibrillation, coronary artery disease with stenting, hypertension, stroke with right-sided deficits, COPD, HCV, left peroneal DVT, Xarelto use, and lower extremity cellulitis presented to [facility] Emergency Department on October 29 with weakness that began on the morning of presentation.  By report he had been feeling well the day prior but did not feel well on October 29.  In the emergency department he had AFib with RVR and hypotension, but oxygenation was stable on room air.  Patient was drowsy but would wake and speak without difficulty.  He reported no chest pain or shortness of breath.  Labs were concerning for CARINA with  and creatinine 5.38.  Potassium was 7.  High sensitivity troponin was in the normal range at 40.  Chest x-ray had clear lungs but did show marked cardiomegaly.  ED attempted cardioversion with the AFib with RVR, but the patient did not cardiovert.  By report, EKGs did not show evidence of significant peak T-waves. He received calcium, dextrose/insulin, lactated Ringer's, IV metoprolol, IV bicarbonate, and Lokelma.  Levophed infusion was initiated.  Blood pressure improved with the Levophed, but tachycardia persisted.  Patient has a PICC line in place.  He also had a Wilcox catheter placed and had a small amount of urine with catheter placement.  Case discussed with the ED doctor and with Critical Care Medicine at [facility].  Patient transfer to [facility] and admitted to the ICU. He was placed on bicarb and amiodarone infusions.         Overview/Hospital Course:  The patient presented with weakness that began on the morning of presentation. By report he had been feeling well the day prior but did not feel well on October 29. In the emergency department he had AFib with RVR and hypotension, but oxygenation was stable on room air. Patient was drowsy but would wake and speak without difficulty. He reported no chest pain or shortness of breath. Labs were concerning for CARINA with  and creatinine 5.38. Potassium was 7. High sensitivity troponin was in the normal range at 40. Chest x-ray had clear lungs but did show marked cardiomegaly. ED attempted cardioversion with the AFib with RVR, but the patient did not cardiovert. By report, EKGs did not show evidence of significant peak T-waves. He received calcium, dextrose/insulin, lactated Ringer's, IV metoprolol, IV bicarbonate, and Lokelma. Levophed infusion was initiated. Blood pressure improved with the Levophed, but tachycardia persisted. Patient has a PICC line in place. He also had a Wilcox catheter placed and had a small amount of urine with catheter placement. Case discussed with the ED doctor and with Critical Care Medicine at [facility]. Patient transfer to [facility] and admitted to the ICU. He was placed on bicarb and amiodarone infusions. He was weaned off of levophed.  His renal function improved. Arterial US revealed PAD and occluded sfa. Vascular surgery was consulted, and evaluation noted plans for outpatient follow up with left leg angiogram for possible intervention and right leg intervention in the near future as well.  Plan to follow up in 2-3 weeks after medical stabilization.  Pending SNF placement    Interval History: . No acute events overnight, afebrile, hemodynamically stable.  Denies any acute concern. Pending SNF placement.  Bradycardia on morning vitals, so metoprolol was held.  Upon evaluation, patient denied any symptoms    Objective:     Vital  Signs (Most Recent):  Temp: 98 °F (36.7 °C) (11/08/24 1714)  Pulse: 62 (11/08/24 1730)  Resp: 16 (11/08/24 1714)  BP: 125/68 (11/08/24 1714)  SpO2: 95 % (11/08/24 1714) Vital Signs (24h Range):  Temp:  [97.8 °F (36.6 °C)-98.2 °F (36.8 °C)] 98 °F (36.7 °C)  Pulse:  [42-62] 62  Resp:  [16-18] 16  SpO2:  [95 %-100 %] 95 %  BP: (102-125)/(58-84) 125/68     Weight: 80.3 kg (177 lb 0.5 oz)  Body mass index is 26.14 kg/m².    Intake/Output Summary (Last 24 hours) at 11/8/2024 1942  Last data filed at 11/8/2024 1916  Gross per 24 hour   Intake 350 ml   Output 325 ml   Net 25 ml           Physical Exam  Vitals and nursing note reviewed.   Constitutional:       General: He is not in acute distress.     Appearance: He is ill-appearing. He is not toxic-appearing or diaphoretic.   HENT:      Head: Normocephalic and atraumatic.      Mouth/Throat:      Mouth: Mucous membranes are moist.   Eyes:      General: No scleral icterus.        Right eye: No discharge.         Left eye: No discharge.   Cardiovascular:      Rate and Rhythm: Bradycardia present. Rhythm irregular.      Heart sounds: Normal heart sounds.   Pulmonary:      Effort: Pulmonary effort is normal. No respiratory distress.   Abdominal:      General: Bowel sounds are normal.      Tenderness: There is no abdominal tenderness.   Musculoskeletal:      Cervical back: No rigidity.      Right lower leg: No edema.      Left lower leg: No edema.   Skin:     General: Skin is warm and dry.      Coloration: Skin is not jaundiced.   Neurological:      Mental Status: He is alert and oriented to person, place, and time. Mental status is at baseline.   Psychiatric:         Mood and Affect: Mood normal.         Behavior: Behavior normal.             Significant Labs: All pertinent labs within the past 24 hours have been reviewed.  LABS:  Recent Labs   Lab 11/06/24  0529 11/07/24  0600 11/08/24  0439   * 138 138   K 5.1 5.1 4.9    110 110   CO2 21* 22* 19*   BUN 28* 26* 29*    CREATININE 1.4 1.4 1.4   GLU 77 82 80   ANIONGAP 7* 6* 9     Recent Labs   Lab 11/06/24  0529 11/07/24  0600 11/08/24  0439   MG 2.0 2.0 2.0   PHOS 3.8 3.2 3.6     Recent Labs   Lab 11/06/24  0529 11/07/24  0600 11/08/24  0439   AST 21 32 28   ALT 12 18 19   ALKPHOS 43 49 48   BILITOT 0.4 0.3 0.3   ALBUMIN 3.0* 3.1* 3.2*     POCT Glucose:   Recent Labs   Lab 11/05/24  1144 11/06/24  1157 11/06/24  2158   POCTGLUCOSE 94 85 89    Recent Labs   Lab 11/06/24 0529 11/07/24 0600 11/08/24  0439   WBC 5.75 5.74 5.02   HGB 10.6* 11.0* 11.2*   HCT 33.3* 34.5* 36.3*   * 166 180   GRAN 72.9  4.2 70.2  4.0 59.9  3.0        Micro  Blood Cultures  Lab Results   Component Value Date    LABBLOO No growth after 5 days. 10/30/2024    LABBLOO No growth after 5 days. 10/30/2024       Significant Imaging: I have reviewed all pertinent imaging results/findings within the past 24 hours.  US Lower Extrem Arteries Bilat with GEOVANNY (xpd)   Final Result      1.  Markedly abnormal study.  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  There is monophasic flow involving all vessels imaged with marked plaque formation diffusely.  There are low velocities involving the bilateral popliteal arteries and trifurcation vessels.      2.  There are elevated velocities involving both profunda femoral arteries, consistent with significant stenosis.      3.  The bilateral ABIs are unable to be measured due to noncompliance ankle vessels.         Electronically signed by: Bentley Dumont MD   Date:    11/02/2024   Time:    18:56      US Kidney   Final Result      No hydronephrosis         Electronically signed by: Umair Grant MD   Date:    10/30/2024   Time:    11:06      X-Ray Chest AP Portable   Final Result      Right-sided PICC is satisfactory         Electronically signed by: Robin Sanchez   Date:    10/29/2024   Time:    22:46        Results for orders placed or performed during the hospital encounter of  10/29/24   EKG 12-lead    Collection Time: 11/08/24  9:24 AM   Result Value Ref Range    QRS Duration 132 ms    OHS QTC Calculation 446 ms    Narrative    Test Reason : R00.1,    Vent. Rate : 049 BPM     Atrial Rate : 000 BPM     P-R Int : 000 ms          QRS Dur : 132 ms      QT Int : 494 ms       P-R-T Axes : 000 -52 -62 degrees     QTc Int : 446 ms    Atrial fibrillation with slow ventricular response  Left axis deviation  Right bundle branch block  Septal infarct (cited on or before 29-OCT-2024)  Abnormal ECG  When compared with ECG of 01-NOV-2024 13:47,  Significant changes have occurred    Referred By: RIDGE FUENTES           Confirmed By:          Inpatient Medications:  Continuous Infusions:  Scheduled Meds:   amiodarone  200 mg Oral Daily    arformoteroL  15 mcg Nebulization BID    aspirin  81 mg Oral Daily    atorvastatin  80 mg Oral QHS    budesonide  0.5 mg Nebulization Q12H    empagliflozin  10 mg Oral Daily    famotidine  20 mg Oral BID    losartan  12.5 mg Oral Daily    metoprolol succinate  25 mg Oral Daily    rivaroxaban  20 mg Oral Daily with dinner    senna-docusate 8.6-50 mg  1 tablet Oral BID     PRN Meds:  Current Facility-Administered Medications:     acetaminophen, 650 mg, Oral, Q6H PRN    influenza (adjuvanted), 0.5 mL, Intramuscular, Prior to discharge    ondansetron, 8 mg, Intravenous, Q6H PRN    pneumoc 20-haylee conj-dip cr(PF), 0.5 mL, Intramuscular, vaccine x 1 dose      Assessment/Plan:      * Atrial fibrillation with RVR  Patient has paroxysmal (<7 days) atrial fibrillation. Patient is currently in sinus rhythm. [id number] Score: 1. The patients heart rate in the last 24 hours is as follows:  Pulse  Min: 42  Max: 62     Antiarrhythmics  amiodarone tablet 200 mg, Daily, Oral  metoprolol succinate (TOPROL-XL) 24 hr tablet 25 mg, Daily, Oral    Anticoagulants  rivaroxaban tablet 20 mg, With dinner, Oral    Plan  - Replete lytes with a goal of K>4, Mg >2  - Patient is anticoagulated, see  "medications listed above.  - Patient's afib is currently controlled  - cardiology following        PAD (peripheral artery disease)  Lower extremity altered ultrasound noted "occlusion of the left mid and distal superficial femoral arteries "    -Vascular surgery was consulted, and evaluation noted plans for outpatient follow up with left leg angiogram for possible intervention and right leg intervention in the near future as well.  Plan to follow up in 2-3 weeks after medical stabilization.      -continue aspirin    HFrEF (heart failure with reduced ejection fraction)  Patient has Systolic (HFrEF) heart failure that is Acute on chronic. On presentation their CHF was decompensated. Evidence of decompensated CHF on presentation includes: elevated JVD and shortness of breath. The etiology of their decompensation is likely atrial fibrillation . Most recent  echo results are listed below.      Latest ECHO  Results for orders placed during the hospital encounter of 10/29/24    Echo    Interpretation Summary    Left Ventricle: The left ventricle is normal in size. Ventricular mass is normal. Mildly increased wall thickness. There is concentric remodeling. There is normal systolic function with a visually estimated ejection fraction of 60 - 65%. Unable to assess diastolic function due to atrial fibrillation.    Right Ventricle: Severe right ventricular enlargement. Wall thickness is normal. Systolic function is moderately reduced.    Right Atrium: Right atrium is severely dilated.    Tricuspid Valve: There is severe regurgitation.    Pulmonic Valve: There is mild regurgitation.    Pulmonary Artery: There is moderate pulmonary hypertension. The estimated pulmonary artery systolic pressure is 59 mmHg.    IVC/SVC: Elevated venous pressure at 15 mmHg.    Current Heart Failure Medications  metoprolol succinate (TOPROL-XL) 24 hr tablet 25 mg, Daily, Oral  empagliflozin (Jardiance) tablet 10 mg, Daily, Oral  losartan split tablet " 12.5 mg, Daily, Oral    Plan  - Monitor strict I&Os and daily weights.    - Place on telemetry  - Low sodium diet  - Cardiology has been consulted  - The patient's volume status is improving but not at their baseline as indicated by elevated JVD            Panlobular emphysema  Patient's COPD is controlled currently.  Patient is currently off COPD Pathway. Continue scheduled inhalers Supplemental oxygen and monitor respiratory status closely.     Hyperkalemia  Hyperkalemia is likely due to CARINA.The patients most recent potassium results are listed below.  Recent Labs     11/06/24  0529 11/07/24  0600 11/08/24  0439   K 5.1 5.1 4.9       Plan  - Monitor for arrhythmias with EKG and/or continuous telemetry.               CARINA (acute kidney injury)  CARINA is likely due to  multiple factors, HF, afib, hypotension . Baseline creatinine is  0.7-1 . Most recent creatinine and eGFR are listed below.  Recent Labs     11/06/24  0529 11/07/24 0600 11/08/24  0439   CREATININE 1.4 1.4 1.4   EGFRNORACEVR 55* 55* 55*        Plan  - CARINA is improving  - Avoid nephrotoxins and renally dose meds for GFR listed above  - Monitor urine output, serial BMP, and adjust therapy as needed  - appreciate nephrology      VTE Risk Mitigation (From admission, onward)           Ordered     rivaroxaban tablet 20 mg  With dinner         11/05/24 1000                    Discharge Planning   WIL: 11/7/2024     Code Status: Full Code   Is the patient medically ready for discharge?:     Reason for patient still in hospital (select all that apply): Pending disposition  Discharge Plan A: Skilled Nursing Facility   Discharge Delays: None known at this time              David Gunderson DO  Department of Hospital Medicine   [facility] - Telemetry ([facility])    Voice recognition software was used in the creation of this note/communication and any sound-alike/typographical errors which may have occurred despite initial review prior to signing should be taken in  context when interpreting.  If such errors prevent a clear understanding of the note/communication, please contact the provider/office for clarification.

## 2024-11-09 NOTE — SUBJECTIVE & OBJECTIVE
Interval History: . No acute events overnight, afebrile, hemodynamically stable.  Denies any acute concern. Pending SNF placement.  Bradycardia on morning vitals, so metoprolol was held.  Upon evaluation, patient denied any symptoms    Objective:     Vital Signs (Most Recent):  Temp: 98 °F (36.7 °C) (11/08/24 1714)  Pulse: 62 (11/08/24 1730)  Resp: 16 (11/08/24 1714)  BP: 125/68 (11/08/24 1714)  SpO2: 95 % (11/08/24 1714) Vital Signs (24h Range):  Temp:  [97.8 °F (36.6 °C)-98.2 °F (36.8 °C)] 98 °F (36.7 °C)  Pulse:  [42-62] 62  Resp:  [16-18] 16  SpO2:  [95 %-100 %] 95 %  BP: (102-125)/(58-84) 125/68     Weight: 80.3 kg (177 lb 0.5 oz)  Body mass index is 26.14 kg/m².    Intake/Output Summary (Last 24 hours) at 11/8/2024 1942  Last data filed at 11/8/2024 1916  Gross per 24 hour   Intake 350 ml   Output 325 ml   Net 25 ml           Physical Exam  Vitals and nursing note reviewed.   Constitutional:       General: He is not in acute distress.     Appearance: He is ill-appearing. He is not toxic-appearing or diaphoretic.   HENT:      Head: Normocephalic and atraumatic.      Mouth/Throat:      Mouth: Mucous membranes are moist.   Eyes:      General: No scleral icterus.        Right eye: No discharge.         Left eye: No discharge.   Cardiovascular:      Rate and Rhythm: Bradycardia present. Rhythm irregular.      Heart sounds: Normal heart sounds.   Pulmonary:      Effort: Pulmonary effort is normal. No respiratory distress.   Abdominal:      General: Bowel sounds are normal.      Tenderness: There is no abdominal tenderness.   Musculoskeletal:      Cervical back: No rigidity.      Right lower leg: No edema.      Left lower leg: No edema.   Skin:     General: Skin is warm and dry.      Coloration: Skin is not jaundiced.   Neurological:      Mental Status: He is alert and oriented to person, place, and time. Mental status is at baseline.   Psychiatric:         Mood and Affect: Mood normal.         Behavior: Behavior  normal.             Significant Labs: All pertinent labs within the past 24 hours have been reviewed.  LABS:  Recent Labs   Lab 11/06/24 0529 11/07/24 0600 11/08/24 0439   * 138 138   K 5.1 5.1 4.9    110 110   CO2 21* 22* 19*   BUN 28* 26* 29*   CREATININE 1.4 1.4 1.4   GLU 77 82 80   ANIONGAP 7* 6* 9     Recent Labs   Lab 11/06/24 0529 11/07/24 0600 11/08/24 0439   MG 2.0 2.0 2.0   PHOS 3.8 3.2 3.6     Recent Labs   Lab 11/06/24 0529 11/07/24 0600 11/08/24 0439   AST 21 32 28   ALT 12 18 19   ALKPHOS 43 49 48   BILITOT 0.4 0.3 0.3   ALBUMIN 3.0* 3.1* 3.2*     POCT Glucose:   Recent Labs   Lab 11/05/24  1144 11/06/24  1157 11/06/24  2158   POCTGLUCOSE 94 85 89    Recent Labs   Lab 11/06/24 0529 11/07/24 0600 11/08/24 0439   WBC 5.75 5.74 5.02   HGB 10.6* 11.0* 11.2*   HCT 33.3* 34.5* 36.3*   * 166 180   GRAN 72.9  4.2 70.2  4.0 59.9  3.0        Micro  Blood Cultures  Lab Results   Component Value Date    LABBLOO No growth after 5 days. 10/30/2024    LABBLOO No growth after 5 days. 10/30/2024       Significant Imaging: I have reviewed all pertinent imaging results/findings within the past 24 hours.  US Lower Extrem Arteries Bilat with GEOVANNY (xpd)   Final Result      1.  Markedly abnormal study.  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  There is monophasic flow involving all vessels imaged with marked plaque formation diffusely.  There are low velocities involving the bilateral popliteal arteries and trifurcation vessels.      2.  There are elevated velocities involving both profunda femoral arteries, consistent with significant stenosis.      3.  The bilateral ABIs are unable to be measured due to noncompliance ankle vessels.         Electronically signed by: Bentley Dumont MD   Date:    11/02/2024   Time:    18:56      US Kidney   Final Result      No hydronephrosis         Electronically signed by: Umair Grant MD   Date:    10/30/2024    Time:    11:06      X-Ray Chest AP Portable   Final Result      Right-sided PICC is satisfactory         Electronically signed by: Robin Sanchez   Date:    10/29/2024   Time:    22:46        Results for orders placed or performed during the hospital encounter of 10/29/24   EKG 12-lead    Collection Time: 11/08/24  9:24 AM   Result Value Ref Range    QRS Duration 132 ms    OHS QTC Calculation 446 ms    Narrative    Test Reason : R00.1,    Vent. Rate : 049 BPM     Atrial Rate : 000 BPM     P-R Int : 000 ms          QRS Dur : 132 ms      QT Int : 494 ms       P-R-T Axes : 000 -52 -62 degrees     QTc Int : 446 ms    Atrial fibrillation with slow ventricular response  Left axis deviation  Right bundle branch block  Septal infarct (cited on or before 29-OCT-2024)  Abnormal ECG  When compared with ECG of 01-NOV-2024 13:47,  Significant changes have occurred    Referred By: RIDGE FUENTES           Confirmed By:          Inpatient Medications:  Continuous Infusions:  Scheduled Meds:   amiodarone  200 mg Oral Daily    arformoteroL  15 mcg Nebulization BID    aspirin  81 mg Oral Daily    atorvastatin  80 mg Oral QHS    budesonide  0.5 mg Nebulization Q12H    empagliflozin  10 mg Oral Daily    famotidine  20 mg Oral BID    losartan  12.5 mg Oral Daily    metoprolol succinate  25 mg Oral Daily    rivaroxaban  20 mg Oral Daily with dinner    senna-docusate 8.6-50 mg  1 tablet Oral BID     PRN Meds:  Current Facility-Administered Medications:     acetaminophen, 650 mg, Oral, Q6H PRN    influenza (adjuvanted), 0.5 mL, Intramuscular, Prior to discharge    ondansetron, 8 mg, Intravenous, Q6H PRN    pneumoc 20-haylee conj-dip cr(PF), 0.5 mL, Intramuscular, vaccine x 1 dose

## 2024-11-09 NOTE — PT/OT/SLP PROGRESS
"Physical Therapy  Treatment    Layton Abrams   MRN: [id number]   Admitting Diagnosis: Atrial fibrillation with RVR       PT Start Time: 0915     PT Stop Time: 0940    PT Total Time (min): 25 min       Billable Minutes:  Gait Training 10 and Therapeutic Activity 15    Treatment Type: Treatment  PT/PTA: PTA     Number of PTA visits since last PT visit: 2       General Precautions: Standard, fall  Orthopedic Precautions: N/A  Braces: N/A  Respiratory Status: Room air    Spiritual, Cultural Beliefs, Restorationist Practices, Values that Affect Care: no    Subjective:  Communicated with KIRAN prior to session.    Pain/Comfort  Pain Rating 1: 0/10  Pain Rating Post-Intervention 1: 0/10    Treatment and Education:    "I DON'T WANT TO WALK"     BED MOB SBA    SCOOT TO EOB SBA WITH GOOD UPPER EXTREMITY STRENGTH NOTED.     SIT<-->STAND CG WITH VC FOR UPPER EXTREMITY PLACEMENT    REFUSED TO WALK WITH RW BUT AGREED TO T/F TO CHAIR WITH STAND PIVOT TECHNIQUE WITH RW. BEFORE SITTING, PATIENT AMBULATED 2 STEPS FORWARD/BACKWARD WITH VC FOR TECHNIQUE.     PATIENT NEEDS MAX ENCOURAGEMENT AND DEMONSTRATES DECREASED MOTIVATION HE HAS THE ABILITY BUT IS NOT WILLING TO PUT FORTH SUFFICIENT EFFORT TO PERFORM MORE ACTIVITY.      AM-PAC 6 CLICK MOBILITY  How much help from another person does this patient currently need?   1 = Unable, Total/Dependent Assistance  2 = A lot, Maximum/Moderate Assistance  3 = A little, Minimum/Contact Guard/Supervision  4 = None, Modified [place]/Independent    Turning over in bed (including adjusting bedclothes, sheets and blankets)?: 4  Sitting down on and standing up from a chair with arms (e.g., wheelchair, bedside commode, etc.): 3  Moving from lying on back to sitting on the side of the bed?: 4  Moving to and from a bed to a chair (including a wheelchair)?: 3  Need to walk in hospital room?: 3  Climbing 3-5 steps with a railing?: 1  Basic Mobility Total Score: 18    AM-PAC Raw Score CMS G-Code " Modifier Level of Impairment Assistance   6 % Total / Unable   7 - 9 CM 80 - 100% Maximal Assist   10 - 14 CL 60 - 80% Moderate Assist   15 - 19 CK 40 - 60% Moderate Assist   20 - 22 CJ 20 - 40% Minimal Assist   23 CI 1-20% SBA / CGA   24 CH 0% Independent/ Mod I     Patient left up in chair with all lines intact, call button in reach, NSG notified, and NSG present.    Assessment:  Layton Abrams is a 68 y.o. male with a medical diagnosis of Atrial fibrillation with RVR and presents with .    Rehab identified problem list/impairments: weakness, impaired endurance, decreased upper extremity function, impaired self care skills, decreased lower extremity function, impaired functional mobility, gait instability, impaired cardiopulmonary response to activity (DECREASED MOTIVATION)    Rehab potential is poor.    Activity tolerance: Poor    Discharge recommendations: Moderate Intensity Therapy      Barriers to discharge:      Equipment recommendations: to be determined by next level of care     GOALS:   Multidisciplinary Problems       Physical Therapy Goals          Problem: Physical Therapy    Goal Priority Disciplines Outcome Interventions   Physical Therapy Goal     PT, PT/OT Progressing    Description: Goals to be met by 11/15/24.  1. Pt will complete bed mobility MIN A.  2. Pt will complete sit to stand MIN A.  3. Pt will ambulate 50ft MIN A using RW.  4. Pt will increase AMPAC score by 2 points to progress functional mobility.                       PLAN:    Patient to be seen 3 x/week to address the above listed problems via gait training, therapeutic activities, therapeutic exercises  Plan of Care expires: 11/15/24  Plan of Care reviewed with: patient         11/09/2024

## 2024-11-10 LAB
ALBUMIN SERPL BCP-MCNC: 3.3 G/DL (ref 3.5–5.2)
ALP SERPL-CCNC: 50 U/L (ref 40–150)
ALT SERPL W/O P-5'-P-CCNC: 17 U/L (ref 10–44)
ANION GAP SERPL CALC-SCNC: 7 MMOL/L (ref 8–16)
AST SERPL-CCNC: 24 U/L (ref 10–40)
BASOPHILS # BLD AUTO: 0.02 K/UL (ref 0–0.2)
BASOPHILS NFR BLD: 0.3 % (ref 0–1.9)
BILIRUB SERPL-MCNC: 0.3 MG/DL (ref 0.1–1)
BUN SERPL-MCNC: 26 MG/DL (ref 8–23)
CALCIUM SERPL-MCNC: 8.7 MG/DL (ref 8.7–10.5)
CHLORIDE SERPL-SCNC: 111 MMOL/L (ref 95–110)
CO2 SERPL-SCNC: 19 MMOL/L (ref 23–29)
CREAT SERPL-MCNC: 1.4 MG/DL (ref 0.5–1.4)
DIFFERENTIAL METHOD BLD: ABNORMAL
EOSINOPHIL # BLD AUTO: 0.2 K/UL (ref 0–0.5)
EOSINOPHIL NFR BLD: 3.6 % (ref 0–8)
ERYTHROCYTE [DISTWIDTH] IN BLOOD BY AUTOMATED COUNT: 13.8 % (ref 11.5–14.5)
EST. GFR  (NO RACE VARIABLE): 55 ML/MIN/1.73 M^2
GLUCOSE SERPL-MCNC: 70 MG/DL (ref 70–110)
HCT VFR BLD AUTO: 38.3 % (ref 40–54)
HGB BLD-MCNC: 11.9 G/DL (ref 14–18)
IMM GRANULOCYTES # BLD AUTO: 0.02 K/UL (ref 0–0.04)
IMM GRANULOCYTES NFR BLD AUTO: 0.3 % (ref 0–0.5)
LYMPHOCYTES # BLD AUTO: 1.3 K/UL (ref 1–4.8)
LYMPHOCYTES NFR BLD: 19.9 % (ref 18–48)
MAGNESIUM SERPL-MCNC: 1.8 MG/DL (ref 1.6–2.6)
MCH RBC QN AUTO: 27.6 PG (ref 27–31)
MCHC RBC AUTO-ENTMCNC: 31.1 G/DL (ref 32–36)
MCV RBC AUTO: 89 FL (ref 82–98)
MONOCYTES # BLD AUTO: 0.6 K/UL (ref 0.3–1)
MONOCYTES NFR BLD: 9 % (ref 4–15)
NEUTROPHILS # BLD AUTO: 4.2 K/UL (ref 1.8–7.7)
NEUTROPHILS NFR BLD: 66.9 % (ref 38–73)
NRBC BLD-RTO: 0 /100 WBC
PHOSPHATE SERPL-MCNC: 2.8 MG/DL (ref 2.7–4.5)
PLATELET # BLD AUTO: 199 K/UL (ref 150–450)
PMV BLD AUTO: 9 FL (ref 9.2–12.9)
POTASSIUM SERPL-SCNC: 5 MMOL/L (ref 3.5–5.1)
PROT SERPL-MCNC: 6.7 G/DL (ref 6–8.4)
RBC # BLD AUTO: 4.31 M/UL (ref 4.6–6.2)
SODIUM SERPL-SCNC: 137 MMOL/L (ref 136–145)
WBC # BLD AUTO: 6.33 K/UL (ref 3.9–12.7)

## 2024-11-10 PROCEDURE — 25000003 PHARM REV CODE 250: Performed by: SPECIALIST

## 2024-11-10 PROCEDURE — 25000003 PHARM REV CODE 250: Performed by: INTERNAL MEDICINE

## 2024-11-10 PROCEDURE — 94640 AIRWAY INHALATION TREATMENT: CPT

## 2024-11-10 PROCEDURE — 25000242 PHARM REV CODE 250 ALT 637 W/ HCPCS: Performed by: NURSE PRACTITIONER

## 2024-11-10 PROCEDURE — 25000003 PHARM REV CODE 250: Performed by: NURSE PRACTITIONER

## 2024-11-10 PROCEDURE — 25000003 PHARM REV CODE 250: Performed by: STUDENT IN AN ORGANIZED HEALTH CARE EDUCATION/TRAINING PROGRAM

## 2024-11-10 PROCEDURE — 36415 COLL VENOUS BLD VENIPUNCTURE: CPT | Performed by: NURSE PRACTITIONER

## 2024-11-10 PROCEDURE — 84100 ASSAY OF PHOSPHORUS: CPT | Performed by: NURSE PRACTITIONER

## 2024-11-10 PROCEDURE — 83735 ASSAY OF MAGNESIUM: CPT | Performed by: NURSE PRACTITIONER

## 2024-11-10 PROCEDURE — 80053 COMPREHEN METABOLIC PANEL: CPT | Performed by: NURSE PRACTITIONER

## 2024-11-10 PROCEDURE — 25000242 PHARM REV CODE 250 ALT 637 W/ HCPCS: Performed by: INTERNAL MEDICINE

## 2024-11-10 PROCEDURE — 21400001 HC TELEMETRY ROOM

## 2024-11-10 PROCEDURE — 94761 N-INVAS EAR/PLS OXIMETRY MLT: CPT

## 2024-11-10 PROCEDURE — 85025 COMPLETE CBC W/AUTO DIFF WBC: CPT | Performed by: NURSE PRACTITIONER

## 2024-11-10 RX ADMIN — BUDESONIDE INHALATION 0.5 MG: 0.5 SUSPENSION RESPIRATORY (INHALATION) at 07:11

## 2024-11-10 RX ADMIN — ARFORMOTEROL TARTRATE 15 MCG: 15 SOLUTION RESPIRATORY (INHALATION) at 07:11

## 2024-11-10 RX ADMIN — ASPIRIN 81 MG: 81 TABLET, COATED ORAL at 08:11

## 2024-11-10 RX ADMIN — SENNOSIDES AND DOCUSATE SODIUM 1 TABLET: 50; 8.6 TABLET ORAL at 09:11

## 2024-11-10 RX ADMIN — AMIODARONE HYDROCHLORIDE 200 MG: 200 TABLET ORAL at 08:11

## 2024-11-10 RX ADMIN — ATORVASTATIN CALCIUM 80 MG: 40 TABLET, FILM COATED ORAL at 09:11

## 2024-11-10 RX ADMIN — EMPAGLIFLOZIN 10 MG: 10 TABLET, FILM COATED ORAL at 08:11

## 2024-11-10 RX ADMIN — LOSARTAN POTASSIUM 12.5 MG: 25 TABLET, FILM COATED ORAL at 08:11

## 2024-11-10 RX ADMIN — RIVAROXABAN 20 MG: 20 TABLET, FILM COATED ORAL at 04:11

## 2024-11-10 RX ADMIN — FAMOTIDINE 20 MG: 20 TABLET ORAL at 09:11

## 2024-11-10 RX ADMIN — FAMOTIDINE 20 MG: 20 TABLET ORAL at 08:11

## 2024-11-10 RX ADMIN — SENNOSIDES AND DOCUSATE SODIUM 1 TABLET: 50; 8.6 TABLET ORAL at 08:11

## 2024-11-10 RX ADMIN — METOPROLOL SUCCINATE 12.5 MG: 25 TABLET, EXTENDED RELEASE ORAL at 08:11

## 2024-11-10 NOTE — ASSESSMENT & PLAN NOTE
CARINA is likely due to  multiple factors, HF, afib, hypotension . Baseline creatinine is  0.7-1 . Most recent creatinine and eGFR are listed below.  Recent Labs     11/07/24  0600 11/08/24  0439 11/09/24  0446   CREATININE 1.4 1.4 1.3   EGFRNORACEVR 55* 55* >60        Plan  - CAIRNA is improving  - Avoid nephrotoxins and renally dose meds for GFR listed above  - Monitor urine output, serial BMP, and adjust therapy as needed  - appreciate nephrology

## 2024-11-10 NOTE — ASSESSMENT & PLAN NOTE
Patient has Systolic (HFrEF) heart failure that is Acute on chronic. On presentation their CHF was decompensated. Evidence of decompensated CHF on presentation includes: elevated JVD and shortness of breath. The etiology of their decompensation is likely atrial fibrillation . Most recent  echo results are listed below.      Latest ECHO  Results for orders placed during the hospital encounter of 10/29/24    Echo    Interpretation Summary    Left Ventricle: The left ventricle is normal in size. Ventricular mass is normal. Mildly increased wall thickness. There is concentric remodeling. There is normal systolic function with a visually estimated ejection fraction of 60 - 65%. Unable to assess diastolic function due to atrial fibrillation.    Right Ventricle: Severe right ventricular enlargement. Wall thickness is normal. Systolic function is moderately reduced.    Right Atrium: Right atrium is severely dilated.    Tricuspid Valve: There is severe regurgitation.    Pulmonic Valve: There is mild regurgitation.    Pulmonary Artery: There is moderate pulmonary hypertension. The estimated pulmonary artery systolic pressure is 59 mmHg.    IVC/SVC: Elevated venous pressure at 15 mmHg.    Current Heart Failure Medications  empagliflozin (Jardiance) tablet 10 mg, Daily, Oral  losartan split tablet 12.5 mg, Daily, Oral  metoprolol succinate (TOPROL-XL) 24 hr split tablet 12.5 mg, Daily, Oral    Plan  - Monitor strict I&Os and daily weights.    - Place on telemetry  - Low sodium diet  - Cardiology has been consulted  - The patient's volume status is improving but not at their baseline as indicated by elevated JVD

## 2024-11-10 NOTE — PROGRESS NOTES
[facility] - Telemetry ([facility])  Adult Nutrition  Progress Note    SUMMARY       Recommendations    Recommendation/Intervention:   1. Recommend modify pt's diet to Cardiac diet when medically approrpiate   2. Recommend pt continues Waylon BID for wound healing   3. Updated weight, twice weekly    Goals:   1. Pt will continue to tolerate and consume >75% EEN and EPN prior to RD follow up   2. Pt will tolerate and consume Waylon BID prior to RD follow up  Nutrition Goal Status: new  Communication of RD Recs: other (comment) (POC, sticky note)    Assessment and Plan    Nutrition Problem  Increased protein needs    Related to (etiology):   Increased demand for nutrition     Signs and Symptoms (as evidenced by):   Decubitus ulcers, Loss of skin integrity, Wound healing needs    Interventions/Recommendations (treatment strategy):  1. Amino acids medical food supplement therapy   2. Collaboration by nutrition professional with other providers     Nutrition Diagnosis Status:   New       Malnutrition Assessment     Skin (Micronutrient): cracked, edema, wounds unhealed (flaky; Chris score = 16 (mild risk)                                 Reason for Assessment    Reason For Assessment: length of stay  Diagnosis:  (Atrial fibrillation with RVR)  General Information Comments:   11/10/24: 68 y.o. Male admitted for Atrial fibrillation with RVR. PMH: CARINA, Hyperkalemia, Panlobbular emphysema, HFrEF, PAD, COPD, CHF, Stroke, HTN, Arthritis, Liver disease. Pt currently on a Renal diet. H&P noted pt presented to OSH ED d/t weakness x 1 day, pt  transferred for CARINA/hyperkalemia w/ potential need nephrology services along w/ A Fib w/ RVR and hypotension, pt admitted to ICU. EMR noted pt stepped down to floor, CARINA improving, pt asymptomatic but vitals concernering for bradycardia, SNF placement pending. RD added Waylon to pt's d/c orders and trays. Reviewed chart: 100% PO intake; LBM 11/9; Skin: flaky, cracked; Altered skin: L lateral heel  "ulceration, MASD bilateral groin/DTPI L heel partial thickness tissue loss, Abcess lumbar spine, L leg full thickness tissue loss, all clean/dry/intact, details per Wound care note 11/8/24; Chris score: 16 (mild risk); Edema: 1+ trace bilateral foot/leg, generalized. Labs, meds, weight reviewed. Weight charted 10/29/24 200 lbs, 11/9/24 182 lbs (BMI 26.89, Norml for age), -18 lb wt loss (9% wt change) x 10 days, re weigh for accuracy warranted. Unable to perform NFPE d/t RD remote, will perform at follow up if waranted. RD will continue to follow and monitor pt's nutritional status during admit.    Nutrition Discharge Planning: Cardiac diet + Waylon BID + Suplena as warranted    Nutrition Risk Screen    Nutrition Risk Screen: large or nonhealing wound, burn or pressure injury    Nutrition Related Social Determinants of Health: SDOH: Unable to assess at this time.     Nutrition/Diet History    Spiritual, Cultural Beliefs, Cheondoism Practices, Values that Affect Care: no  Food Allergies: NKFA  Factors Affecting Nutritional Intake: None identified at this time    Anthropometrics    Temp: 98 °F (36.7 °C)  Height Method: Stated  Height: 5' 9" (175.3 cm)  Height (inches): 69 in  Weight Method: Bed Scale  Weight: 82.6 kg (182 lb 1.6 oz)  Weight (lb): 182.1 lb  Ideal Body Weight (IBW), Male: 160 lb  % Ideal Body Weight, Male (lb): 113.81 %  BMI (Calculated): 26.9  BMI Grade: 25 - 29.9 - overweight (Normal for age)     Wt Readings from Last 15 Encounters:   11/10/24 82.6 kg (182 lb 1.6 oz)   10/29/24 90.7 kg (200 lb)     Lab/Procedures/Meds    Pertinent Labs Reviewed: reviewed  Pertinent Labs Comments: Albumin (L), Anion gap (L), Cl (H), BUN (H), eGFR 55  Pertinent Medications Reviewed: reviewed  Pertinent Medications Comments: senna-docusate, rivaroxaban, Toprol-XL, losartan, famotidine, Jardiance, budesonide, atorvastatin, asprin, arformeterol, amiodarone    BMP  Lab Results   Component Value Date     11/10/2024    K " "5.0 11/10/2024     (H) 11/10/2024    CO2 19 (L) 11/10/2024    BUN 26 (H) 11/10/2024    CREATININE 1.4 11/10/2024    CALCIUM 8.7 11/10/2024    ANIONGAP 7 (L) 11/10/2024    EGFRNORACEVR 55 (A) 11/10/2024     Lab Results   Component Value Date    CALCIUM 8.7 11/10/2024    PHOS 2.8 11/10/2024     Lab Results   Component Value Date    ALBUMIN 3.3 (L) 11/10/2024     Lab Results   Component Value Date    ALT 17 11/10/2024    AST 24 11/10/2024    ALKPHOS 50 11/10/2024    BILITOT 0.3 11/10/2024     No results for input(s): "POCTGLUCOSE" in the last 24 hours.    No results found for: "LABA1C", "HGBA1C"    Lab Results   Component Value Date    WBC 6.33 11/10/2024    HGB 11.9 (L) 11/10/2024    HCT 38.3 (L) 11/10/2024    MCV 89 11/10/2024     11/10/2024       Scheduled Meds:   amiodarone  200 mg Oral Daily    arformoteroL  15 mcg Nebulization BID    aspirin  81 mg Oral Daily    atorvastatin  80 mg Oral QHS    budesonide  0.5 mg Nebulization Q12H    empagliflozin  10 mg Oral Daily    famotidine  20 mg Oral BID    losartan  12.5 mg Oral Daily    metoprolol succinate  12.5 mg Oral Daily    rivaroxaban  20 mg Oral Daily with dinner    senna-docusate 8.6-50 mg  1 tablet Oral BID     Continuous Infusions:  PRN Meds:.  Current Facility-Administered Medications:     acetaminophen, 650 mg, Oral, Q6H PRN    influenza (adjuvanted), 0.5 mL, Intramuscular, Prior to discharge    ondansetron, 8 mg, Intravenous, Q6H PRN    pneumoc 20-haylee conj-dip cr(PF), 0.5 mL, Intramuscular, vaccine x 1 dose      Physical Findings/Assessment         Estimated/Assessed Needs    Weight Used For Calorie Calculations: 82.6 kg (182 lb 1.6 oz)  Energy Calorie Requirements (kcal): 1817 kcals (22 kcals/kg ABW (CHF vs COPD)  Energy Need Method: Kcal/kg  Protein Requirements: 66-83 g (0.8-1.0 g/kg ABW (CARINA, no dialysis vs Pressure injury/Wounds)  Weight Used For Protein Calculations: 82.6 kg (182 lb 1.6 oz)  Fluid Requirements (mL): 500 mL + total output " (CARINA)  Estimated Fluid Requirement Method: other (see comments)  RDA Method (mL): 1817  CHO Requirement: 227 g (1817 kcals/8)      Nutrition Prescription Ordered    Current Diet Order: Renal diet  Oral Nutrition Supplement: Waylon BID    Evaluation of Received Nutrient/Fluid Intake  I/O: (Net since admit):  11/10/24: -6458.9 mL    Energy Calories Required: meeting needs  Protein Required: meeting needs  Fluid Required: meeting needs  Total Fluid Intake (mL): 600  Total Fluid Output (mL): 1245   Tolerance: tolerating  % Intake of Estimated Energy Needs: 100%  % Meal Intake: 100%    Nutrition Risk    Level of Risk/Frequency of Follow-up: low (F/u x 1 weekly)     Monitor and Evaluation    Food and Nutrient Intake: energy intake, food and beverage intake  Food and Nutrient Adminstration: diet order  Knowledge/Beliefs/Attitudes: food and nutrition knowledge/skill, beliefs and attitudes  Anthropometric Measurements: weight, weight change, body mass index  Biochemical Data, Medical Tests and Procedures: electrolyte and renal panel, inflammatory profile     Nutrition Follow-Up    RD Follow-up?: Yes  Coco Wells, BS, RDN, LDN

## 2024-11-10 NOTE — PLAN OF CARE
Nutrition Recommendations/Interventions 11/10/24:   1. Recommend modify pt's diet to Cardiac diet when medically approrpiate   2. Recommend pt continues Waylon BID for wound healing   3. Updated weight, twice weekly  4. Collaboration by nutrition professional with other providers     Goals:   1. Pt will continue to tolerate and consume >75% EEN and EPN prior to RD follow up   2. Pt will tolerate and consume Waylon BID prior to RD follow up    KRISTAL Oh, RDN, LDN

## 2024-11-10 NOTE — ASSESSMENT & PLAN NOTE
Patient has paroxysmal (<7 days) atrial fibrillation. Patient is currently in sinus rhythm. [id number] Score: 1. The patients heart rate in the last 24 hours is as follows:  Pulse  Min: 39  Max: 73     Antiarrhythmics  amiodarone tablet 200 mg, Daily, Oral  metoprolol succinate (TOPROL-XL) 24 hr split tablet 12.5 mg, Daily, Oral    Anticoagulants  rivaroxaban tablet 20 mg, With dinner, Oral    Plan  - Replete lytes with a goal of K>4, Mg >2  - Patient is anticoagulated, see medications listed above.  - Patient's afib is currently controlled  - cardiology following

## 2024-11-10 NOTE — SUBJECTIVE & OBJECTIVE
Interval History: No acute events overnight, afebrile.   Vitals concerning for bradycardia, but patient remains asymptomatic.  Decreased metoprolol dosage while continuing hold parameters    Objective:     Vital Signs (Most Recent):  Temp: 98.1 °F (36.7 °C) (11/09/24 1550)  Pulse: (!) 54 (11/09/24 1748)  Resp: 20 (11/09/24 1230)  BP: 125/62 (11/09/24 1550)  SpO2: 97 % (11/09/24 1550) Vital Signs (24h Range):  Temp:  [97.3 °F (36.3 °C)-98.3 °F (36.8 °C)] 98.1 °F (36.7 °C)  Pulse:  [39-73] 54  Resp:  [18-20] 20  SpO2:  [95 %-100 %] 97 %  BP: ()/(60-81) 125/62     Weight: 82.6 kg (182 lb 1.6 oz)  Body mass index is 26.89 kg/m².    Intake/Output Summary (Last 24 hours) at 11/9/2024 1934  Last data filed at 11/9/2024 1715  Gross per 24 hour   Intake 560 ml   Output 1200 ml   Net -640 ml         Physical Exam  Vitals and nursing note reviewed.   Constitutional:       General: He is not in acute distress.     Appearance: He is ill-appearing. He is not toxic-appearing or diaphoretic.   HENT:      Head: Normocephalic and atraumatic.      Mouth/Throat:      Mouth: Mucous membranes are moist.   Eyes:      General: No scleral icterus.        Right eye: No discharge.         Left eye: No discharge.   Cardiovascular:      Rate and Rhythm: Bradycardia present. Rhythm irregular.      Heart sounds: Normal heart sounds.   Pulmonary:      Effort: Pulmonary effort is normal. No respiratory distress.   Abdominal:      General: Bowel sounds are normal.      Tenderness: There is no abdominal tenderness.   Musculoskeletal:      Cervical back: No rigidity.      Right lower leg: No edema.      Left lower leg: No edema.   Skin:     General: Skin is warm and dry.      Coloration: Skin is not jaundiced.   Neurological:      Mental Status: He is alert and oriented to person, place, and time. Mental status is at baseline.   Psychiatric:         Mood and Affect: Mood normal.         Behavior: Behavior normal.             Significant Labs:  All pertinent labs within the past 24 hours have been reviewed.  LABS:  Recent Labs   Lab 11/07/24  0600 11/08/24  0439 11/09/24  0446    138 137   K 5.1 4.9 4.7    110 111*   CO2 22* 19* 17*   BUN 26* 29* 29*   CREATININE 1.4 1.4 1.3   GLU 82 80 95   ANIONGAP 6* 9 9     Recent Labs   Lab 11/07/24  0600 11/08/24 0439 11/09/24  0446   MG 2.0 2.0 1.9   PHOS 3.2 3.6 3.3     Recent Labs   Lab 11/07/24  0600 11/08/24  0439 11/09/24  0446   AST 32 28 23   ALT 18 19 18   ALKPHOS 49 48 47   BILITOT 0.3 0.3 0.3   ALBUMIN 3.1* 3.2* 3.3*     POCT Glucose:   Recent Labs   Lab 11/05/24  1144 11/06/24  1157 11/06/24  2158   POCTGLUCOSE 94 85 89    Recent Labs   Lab 11/07/24  0600 11/08/24  0439 11/09/24  0446   WBC 5.74 5.02 5.04   HGB 11.0* 11.2* 11.4*   HCT 34.5* 36.3* 35.8*    180 180   GRAN 70.2  4.0 59.9  3.0 66.5  3.4          Micro  Blood Cultures  Lab Results   Component Value Date    LABBLOO No growth after 5 days. 10/30/2024    LABBLOO No growth after 5 days. 10/30/2024       Significant Imaging: I have reviewed all pertinent imaging results/findings within the past 24 hours.  US Lower Extrem Arteries Bilat with GEOVANNY (xpd)   Final Result      1.  Markedly abnormal study.  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  There is monophasic flow involving all vessels imaged with marked plaque formation diffusely.  There are low velocities involving the bilateral popliteal arteries and trifurcation vessels.      2.  There are elevated velocities involving both profunda femoral arteries, consistent with significant stenosis.      3.  The bilateral ABIs are unable to be measured due to noncompliance ankle vessels.         Electronically signed by: Bentley Dumont MD   Date:    11/02/2024   Time:    18:56      US Kidney   Final Result      No hydronephrosis         Electronically signed by: Umair Grant MD   Date:    10/30/2024   Time:    11:06      X-Ray Chest AP Portable    Final Result      Right-sided PICC is satisfactory         Electronically signed by: Robin Sanchez   Date:    10/29/2024   Time:    22:46          Inpatient Medications:    Scheduled Meds:   amiodarone  200 mg Oral Daily    arformoteroL  15 mcg Nebulization BID    aspirin  81 mg Oral Daily    atorvastatin  80 mg Oral QHS    budesonide  0.5 mg Nebulization Q12H    empagliflozin  10 mg Oral Daily    famotidine  20 mg Oral BID    losartan  12.5 mg Oral Daily    [START ON 11/10/2024] metoprolol succinate  12.5 mg Oral Daily    rivaroxaban  20 mg Oral Daily with dinner    senna-docusate 8.6-50 mg  1 tablet Oral BID     PRN Meds:  Current Facility-Administered Medications:     acetaminophen, 650 mg, Oral, Q6H PRN    influenza (adjuvanted), 0.5 mL, Intramuscular, Prior to discharge    ondansetron, 8 mg, Intravenous, Q6H PRN    pneumoc 20-haylee conj-dip cr(PF), 0.5 mL, Intramuscular, vaccine x 1 dose

## 2024-11-10 NOTE — ASSESSMENT & PLAN NOTE
Hyperkalemia is likely due to CARINA.The patients most recent potassium results are listed below.  Recent Labs     11/07/24  0600 11/08/24  0439 11/09/24  0446   K 5.1 4.9 4.7       Plan  - Monitor for arrhythmias with EKG and/or continuous telemetry.

## 2024-11-10 NOTE — PLAN OF CARE
[id number]/A232 SANTOS Abrams is a 68 y.o.male admitted on 10/29/2024 for Atrial fibrillation with RVR   Code Status: Full Code MRN: [id number]   Review of patient's allergies indicates:  No Known Allergies  Past Medical History:   Diagnosis Date    Arthritis     CHF (congestive heart failure)     COPD (chronic obstructive pulmonary disease)     Hypertension     Liver disease     Stroke       PRN meds    acetaminophen, 650 mg, Q6H PRN  influenza (adjuvanted), 0.5 mL, Prior to discharge  ondansetron, 8 mg, Q6H PRN  pneumoc 20-haylee conj-dip cr(PF), 0.5 mL, vaccine x 1 dose      Chart check completed. Will continue plan of care.     Awaiting placement at [facility]      Orientation: disoriented to, time  Rashad Coma Scale Score: 14     Lead Monitored: Lead II Rhythm: atrial rhythm Frequency/Ectopy: PVCs  Cardiac/Telemetry Box Number: [id number]  VTE Core Measure: Pharmacological prophylaxis initiated/maintained Last Bowel Movement: 11/09/24  Diet Renal  Voiding Characteristics: voids spontaneously without difficulty  Chris Score: 16  Fall Risk Score: 17  Accucheck []   Freq?      Lines/Drains/Airways       Peripheral Intravenous Line  Duration                  Peripheral IV - Single Lumen 11/06/24 1521 20 G Left;Posterior Forearm 4 days

## 2024-11-10 NOTE — PROGRESS NOTES
[facility] Medicine  Progress Note    Patient Name: Layton Abrams  MRN: [id number]  Patient Class: IP- Inpatient   Admission Date: 10/29/2024  Length of Stay: 11 days  Attending Physician: David Gunderson DO  Primary Care Provider: Nellie Cadena MD        Subjective:     Principal Problem:Atrial fibrillation with RVR        HPI:  The patient is 68-year-old male with a history of atrial fibrillation, coronary artery disease with stenting, hypertension, stroke with right-sided deficits, COPD, HCV, left peroneal DVT, Xarelto use, and lower extremity cellulitis presented to [facility] Emergency Department on October 29 with weakness that began on the morning of presentation.  By report he had been feeling well the day prior but did not feel well on October 29.  In the emergency department he had AFib with RVR and hypotension, but oxygenation was stable on room air.  Patient was drowsy but would wake and speak without difficulty.  He reported no chest pain or shortness of breath.  Labs were concerning for CARINA with  and creatinine 5.38.  Potassium was 7.  High sensitivity troponin was in the normal range at 40.  Chest x-ray had clear lungs but did show marked cardiomegaly.  ED attempted cardioversion with the AFib with RVR, but the patient did not cardiovert.  By report, EKGs did not show evidence of significant peak T-waves. He received calcium, dextrose/insulin, lactated Ringer's, IV metoprolol, IV bicarbonate, and Lokelma.  Levophed infusion was initiated.  Blood pressure improved with the Levophed, but tachycardia persisted.  Patient has a PICC line in place.  He also had a Wilcox catheter placed and had a small amount of urine with catheter placement.  Case discussed with the ED doctor and with Critical Care Medicine at [facility].  Patient transfer to [facility] and admitted to the ICU. He was placed on bicarb and amiodarone infusions.         Overview/Hospital Course:  The patient presented with weakness that began on the morning of presentation. By report he had been feeling well the day prior but did not feel well on October 29. In the emergency department he had AFib with RVR and hypotension, but oxygenation was stable on room air. Patient was drowsy but would wake and speak without difficulty. He reported no chest pain or shortness of breath. Labs were concerning for CARINA with  and creatinine 5.38. Potassium was 7. High sensitivity troponin was in the normal range at 40. Chest x-ray had clear lungs but did show marked cardiomegaly. ED attempted cardioversion with the AFib with RVR, but the patient did not cardiovert. By report, EKGs did not show evidence of significant peak T-waves. He received calcium, dextrose/insulin, lactated Ringer's, IV metoprolol, IV bicarbonate, and Lokelma. Levophed infusion was initiated. Blood pressure improved with the Levophed, but tachycardia persisted. Patient has a PICC line in place. He also had a Wilcox catheter placed and had a small amount of urine with catheter placement. Case discussed with the ED doctor and with Critical Care Medicine at [facility]. Patient transfer to [facility] and admitted to the ICU. He was placed on bicarb and amiodarone infusions. He was weaned off of levophed.  His renal function improved. Arterial US revealed PAD and occluded sfa. Vascular surgery was consulted, and evaluation noted plans for outpatient follow up with left leg angiogram for possible intervention and right leg intervention in the near future as well.  Plan to follow up in 2-3 weeks after medical stabilization.  Pending SNF placement    Interval History: No acute events overnight, afebrile.   Vitals concerning for bradycardia, but patient remains asymptomatic.  Decreased metoprolol dosage while continuing hold parameters    Objective:     Vital Signs (Most Recent):  Temp: 98.1 °F (36.7 °C)  (11/09/24 1550)  Pulse: (!) 54 (11/09/24 1748)  Resp: 20 (11/09/24 1230)  BP: 125/62 (11/09/24 1550)  SpO2: 97 % (11/09/24 1550) Vital Signs (24h Range):  Temp:  [97.3 °F (36.3 °C)-98.3 °F (36.8 °C)] 98.1 °F (36.7 °C)  Pulse:  [39-73] 54  Resp:  [18-20] 20  SpO2:  [95 %-100 %] 97 %  BP: ()/(60-81) 125/62     Weight: 82.6 kg (182 lb 1.6 oz)  Body mass index is 26.89 kg/m².    Intake/Output Summary (Last 24 hours) at 11/9/2024 1934  Last data filed at 11/9/2024 1715  Gross per 24 hour   Intake 560 ml   Output 1200 ml   Net -640 ml         Physical Exam  Vitals and nursing note reviewed.   Constitutional:       General: He is not in acute distress.     Appearance: He is ill-appearing. He is not toxic-appearing or diaphoretic.   HENT:      Head: Normocephalic and atraumatic.      Mouth/Throat:      Mouth: Mucous membranes are moist.   Eyes:      General: No scleral icterus.        Right eye: No discharge.         Left eye: No discharge.   Cardiovascular:      Rate and Rhythm: Bradycardia present. Rhythm irregular.      Heart sounds: Normal heart sounds.   Pulmonary:      Effort: Pulmonary effort is normal. No respiratory distress.   Abdominal:      General: Bowel sounds are normal.      Tenderness: There is no abdominal tenderness.   Musculoskeletal:      Cervical back: No rigidity.      Right lower leg: No edema.      Left lower leg: No edema.   Skin:     General: Skin is warm and dry.      Coloration: Skin is not jaundiced.   Neurological:      Mental Status: He is alert and oriented to person, place, and time. Mental status is at baseline.   Psychiatric:         Mood and Affect: Mood normal.         Behavior: Behavior normal.             Significant Labs: All pertinent labs within the past 24 hours have been reviewed.  LABS:  Recent Labs   Lab 11/07/24  0600 11/08/24  0439 11/09/24  0446    138 137   K 5.1 4.9 4.7    110 111*   CO2 22* 19* 17*   BUN 26* 29* 29*   CREATININE 1.4 1.4 1.3   GLU 82 80  95   ANIONGAP 6* 9 9     Recent Labs   Lab 11/07/24  0600 11/08/24  0439 11/09/24  0446   MG 2.0 2.0 1.9   PHOS 3.2 3.6 3.3     Recent Labs   Lab 11/07/24  0600 11/08/24  0439 11/09/24  0446   AST 32 28 23   ALT 18 19 18   ALKPHOS 49 48 47   BILITOT 0.3 0.3 0.3   ALBUMIN 3.1* 3.2* 3.3*     POCT Glucose:   Recent Labs   Lab 11/05/24  1144 11/06/24  1157 11/06/24  2158   POCTGLUCOSE 94 85 89    Recent Labs   Lab 11/07/24  0600 11/08/24  0439 11/09/24  0446   WBC 5.74 5.02 5.04   HGB 11.0* 11.2* 11.4*   HCT 34.5* 36.3* 35.8*    180 180   GRAN 70.2  4.0 59.9  3.0 66.5  3.4          Micro  Blood Cultures  Lab Results   Component Value Date    LABBLOO No growth after 5 days. 10/30/2024    LABBLOO No growth after 5 days. 10/30/2024       Significant Imaging: I have reviewed all pertinent imaging results/findings within the past 24 hours.  US Lower Extrem Arteries Bilat with GEOVANNY (xpd)   Final Result      1.  Markedly abnormal study.  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  There is monophasic flow involving all vessels imaged with marked plaque formation diffusely.  There are low velocities involving the bilateral popliteal arteries and trifurcation vessels.      2.  There are elevated velocities involving both profunda femoral arteries, consistent with significant stenosis.      3.  The bilateral ABIs are unable to be measured due to noncompliance ankle vessels.         Electronically signed by: Bentley Dumont MD   Date:    11/02/2024   Time:    18:56      US Kidney   Final Result      No hydronephrosis         Electronically signed by: Umair Grant MD   Date:    10/30/2024   Time:    11:06      X-Ray Chest AP Portable   Final Result      Right-sided PICC is satisfactory         Electronically signed by: Robin Sanchez   Date:    10/29/2024   Time:    22:46          Inpatient Medications:    Scheduled Meds:   amiodarone  200 mg Oral Daily    arformoteroL  15 mcg Nebulization  "BID    aspirin  81 mg Oral Daily    atorvastatin  80 mg Oral QHS    budesonide  0.5 mg Nebulization Q12H    empagliflozin  10 mg Oral Daily    famotidine  20 mg Oral BID    losartan  12.5 mg Oral Daily    [START ON 11/10/2024] metoprolol succinate  12.5 mg Oral Daily    rivaroxaban  20 mg Oral Daily with dinner    senna-docusate 8.6-50 mg  1 tablet Oral BID     PRN Meds:  Current Facility-Administered Medications:     acetaminophen, 650 mg, Oral, Q6H PRN    influenza (adjuvanted), 0.5 mL, Intramuscular, Prior to discharge    ondansetron, 8 mg, Intravenous, Q6H PRN    pneumoc 20-haylee conj-dip cr(PF), 0.5 mL, Intramuscular, vaccine x 1 dose      Assessment/Plan:      * Atrial fibrillation with RVR  Patient has paroxysmal (<7 days) atrial fibrillation. Patient is currently in sinus rhythm. [id number] Score: 1. The patients heart rate in the last 24 hours is as follows:  Pulse  Min: 39  Max: 73     Antiarrhythmics  amiodarone tablet 200 mg, Daily, Oral  metoprolol succinate (TOPROL-XL) 24 hr split tablet 12.5 mg, Daily, Oral    Anticoagulants  rivaroxaban tablet 20 mg, With dinner, Oral    Plan  - Replete lytes with a goal of K>4, Mg >2  - Patient is anticoagulated, see medications listed above.  - Patient's afib is currently controlled  - cardiology following        PAD (peripheral artery disease)  Lower extremity altered ultrasound noted "occlusion of the left mid and distal superficial femoral arteries "    -Vascular surgery was consulted, and evaluation noted plans for outpatient follow up with left leg angiogram for possible intervention and right leg intervention in the near future as well.  Plan to follow up in 2-3 weeks after medical stabilization.      -continue aspirin    HFrEF (heart failure with reduced ejection fraction)  Patient has Systolic (HFrEF) heart failure that is Acute on chronic. On presentation their CHF was decompensated. Evidence of decompensated CHF on presentation includes: elevated JVD and " shortness of breath. The etiology of their decompensation is likely atrial fibrillation . Most recent  echo results are listed below.      Latest ECHO  Results for orders placed during the hospital encounter of 10/29/24    Echo    Interpretation Summary    Left Ventricle: The left ventricle is normal in size. Ventricular mass is normal. Mildly increased wall thickness. There is concentric remodeling. There is normal systolic function with a visually estimated ejection fraction of 60 - 65%. Unable to assess diastolic function due to atrial fibrillation.    Right Ventricle: Severe right ventricular enlargement. Wall thickness is normal. Systolic function is moderately reduced.    Right Atrium: Right atrium is severely dilated.    Tricuspid Valve: There is severe regurgitation.    Pulmonic Valve: There is mild regurgitation.    Pulmonary Artery: There is moderate pulmonary hypertension. The estimated pulmonary artery systolic pressure is 59 mmHg.    IVC/SVC: Elevated venous pressure at 15 mmHg.    Current Heart Failure Medications  empagliflozin (Jardiance) tablet 10 mg, Daily, Oral  losartan split tablet 12.5 mg, Daily, Oral  metoprolol succinate (TOPROL-XL) 24 hr split tablet 12.5 mg, Daily, Oral    Plan  - Monitor strict I&Os and daily weights.    - Place on telemetry  - Low sodium diet  - Cardiology has been consulted  - The patient's volume status is improving but not at their baseline as indicated by elevated JVD            Panlobular emphysema  Patient's COPD is controlled currently.  Patient is currently off COPD Pathway. Continue scheduled inhalers Supplemental oxygen and monitor respiratory status closely.     Hyperkalemia  Hyperkalemia is likely due to CARINA.The patients most recent potassium results are listed below.  Recent Labs     11/07/24  0600 11/08/24  0439 11/09/24  0446   K 5.1 4.9 4.7       Plan  - Monitor for arrhythmias with EKG and/or continuous telemetry.               CARINA (acute kidney  injury)  CARINA is likely due to  multiple factors, HF, afib, hypotension . Baseline creatinine is  0.7-1 . Most recent creatinine and eGFR are listed below.  Recent Labs     11/07/24  0600 11/08/24  0439 11/09/24  0446   CREATININE 1.4 1.4 1.3   EGFRNORACEVR 55* 55* >60        Plan  - CARINA is improving  - Avoid nephrotoxins and renally dose meds for GFR listed above  - Monitor urine output, serial BMP, and adjust therapy as needed  - appreciate nephrology      VTE Risk Mitigation (From admission, onward)           Ordered     rivaroxaban tablet 20 mg  With dinner         11/05/24 1000                    Discharge Planning   WIL: 11/7/2024     Code Status: Full Code   Is the patient medically ready for discharge?:     Reason for patient still in hospital (select all that apply): Pending disposition  Discharge Plan A: Skilled Nursing Facility   Discharge Delays: None known at this time      David Gunderson DO  Department of Hospital Medicine   [facility] - Telemetry ([facility])    Voice recognition software was used in the creation of this note/communication and any sound-alike/typographical errors which may have occurred despite initial review prior to signing should be taken in context when interpreting.  If such errors prevent a clear understanding of the note/communication, please contact the provider/office for clarification.

## 2024-11-11 LAB
ALBUMIN SERPL BCP-MCNC: 3.4 G/DL (ref 3.5–5.2)
ALP SERPL-CCNC: 52 U/L (ref 40–150)
ALT SERPL W/O P-5'-P-CCNC: 17 U/L (ref 10–44)
ANION GAP SERPL CALC-SCNC: 6 MMOL/L (ref 8–16)
AST SERPL-CCNC: 23 U/L (ref 10–40)
BASOPHILS # BLD AUTO: 0.02 K/UL (ref 0–0.2)
BASOPHILS NFR BLD: 0.4 % (ref 0–1.9)
BILIRUB SERPL-MCNC: 0.4 MG/DL (ref 0.1–1)
BUN SERPL-MCNC: 24 MG/DL (ref 8–23)
CALCIUM SERPL-MCNC: 8.6 MG/DL (ref 8.7–10.5)
CHLORIDE SERPL-SCNC: 111 MMOL/L (ref 95–110)
CO2 SERPL-SCNC: 19 MMOL/L (ref 23–29)
CREAT SERPL-MCNC: 1.4 MG/DL (ref 0.5–1.4)
DIFFERENTIAL METHOD BLD: ABNORMAL
EOSINOPHIL # BLD AUTO: 0.2 K/UL (ref 0–0.5)
EOSINOPHIL NFR BLD: 3.4 % (ref 0–8)
ERYTHROCYTE [DISTWIDTH] IN BLOOD BY AUTOMATED COUNT: 13.8 % (ref 11.5–14.5)
EST. GFR  (NO RACE VARIABLE): 55 ML/MIN/1.73 M^2
GLUCOSE SERPL-MCNC: 77 MG/DL (ref 70–110)
HCT VFR BLD AUTO: 37 % (ref 40–54)
HGB BLD-MCNC: 11.9 G/DL (ref 14–18)
IMM GRANULOCYTES # BLD AUTO: 0.01 K/UL (ref 0–0.04)
IMM GRANULOCYTES NFR BLD AUTO: 0.2 % (ref 0–0.5)
LYMPHOCYTES # BLD AUTO: 1.2 K/UL (ref 1–4.8)
LYMPHOCYTES NFR BLD: 21.5 % (ref 18–48)
MAGNESIUM SERPL-MCNC: 1.9 MG/DL (ref 1.6–2.6)
MCH RBC QN AUTO: 27.7 PG (ref 27–31)
MCHC RBC AUTO-ENTMCNC: 32.2 G/DL (ref 32–36)
MCV RBC AUTO: 86 FL (ref 82–98)
MONOCYTES # BLD AUTO: 0.4 K/UL (ref 0.3–1)
MONOCYTES NFR BLD: 7.3 % (ref 4–15)
NEUTROPHILS # BLD AUTO: 3.6 K/UL (ref 1.8–7.7)
NEUTROPHILS NFR BLD: 67.2 % (ref 38–73)
NRBC BLD-RTO: 0 /100 WBC
PHOSPHATE SERPL-MCNC: 3.1 MG/DL (ref 2.7–4.5)
PLATELET # BLD AUTO: 216 K/UL (ref 150–450)
PMV BLD AUTO: 8.9 FL (ref 9.2–12.9)
POTASSIUM SERPL-SCNC: 4.5 MMOL/L (ref 3.5–5.1)
PROT SERPL-MCNC: 6.8 G/DL (ref 6–8.4)
RBC # BLD AUTO: 4.3 M/UL (ref 4.6–6.2)
SODIUM SERPL-SCNC: 136 MMOL/L (ref 136–145)
WBC # BLD AUTO: 5.35 K/UL (ref 3.9–12.7)

## 2024-11-11 PROCEDURE — 36415 COLL VENOUS BLD VENIPUNCTURE: CPT | Performed by: NURSE PRACTITIONER

## 2024-11-11 PROCEDURE — 25000242 PHARM REV CODE 250 ALT 637 W/ HCPCS: Performed by: NURSE PRACTITIONER

## 2024-11-11 PROCEDURE — 25000242 PHARM REV CODE 250 ALT 637 W/ HCPCS: Performed by: INTERNAL MEDICINE

## 2024-11-11 PROCEDURE — 21400001 HC TELEMETRY ROOM

## 2024-11-11 PROCEDURE — 25000003 PHARM REV CODE 250: Performed by: INTERNAL MEDICINE

## 2024-11-11 PROCEDURE — 25000003 PHARM REV CODE 250: Performed by: STUDENT IN AN ORGANIZED HEALTH CARE EDUCATION/TRAINING PROGRAM

## 2024-11-11 PROCEDURE — 97530 THERAPEUTIC ACTIVITIES: CPT

## 2024-11-11 PROCEDURE — 80053 COMPREHEN METABOLIC PANEL: CPT | Performed by: NURSE PRACTITIONER

## 2024-11-11 PROCEDURE — 25000003 PHARM REV CODE 250: Performed by: SPECIALIST

## 2024-11-11 PROCEDURE — 97530 THERAPEUTIC ACTIVITIES: CPT | Mod: CQ

## 2024-11-11 PROCEDURE — 25000003 PHARM REV CODE 250: Performed by: NURSE PRACTITIONER

## 2024-11-11 PROCEDURE — 84100 ASSAY OF PHOSPHORUS: CPT | Performed by: NURSE PRACTITIONER

## 2024-11-11 PROCEDURE — 94640 AIRWAY INHALATION TREATMENT: CPT

## 2024-11-11 PROCEDURE — 83735 ASSAY OF MAGNESIUM: CPT | Performed by: NURSE PRACTITIONER

## 2024-11-11 PROCEDURE — 85025 COMPLETE CBC W/AUTO DIFF WBC: CPT | Performed by: NURSE PRACTITIONER

## 2024-11-11 PROCEDURE — 94761 N-INVAS EAR/PLS OXIMETRY MLT: CPT

## 2024-11-11 RX ADMIN — ARFORMOTEROL TARTRATE 15 MCG: 15 SOLUTION RESPIRATORY (INHALATION) at 07:11

## 2024-11-11 RX ADMIN — ATORVASTATIN CALCIUM 80 MG: 40 TABLET, FILM COATED ORAL at 09:11

## 2024-11-11 RX ADMIN — BUDESONIDE INHALATION 0.5 MG: 0.5 SUSPENSION RESPIRATORY (INHALATION) at 08:11

## 2024-11-11 RX ADMIN — ACETAMINOPHEN 650 MG: 325 TABLET ORAL at 10:11

## 2024-11-11 RX ADMIN — METOPROLOL SUCCINATE 12.5 MG: 25 TABLET, EXTENDED RELEASE ORAL at 09:11

## 2024-11-11 RX ADMIN — FAMOTIDINE 20 MG: 20 TABLET ORAL at 09:11

## 2024-11-11 RX ADMIN — AMIODARONE HYDROCHLORIDE 200 MG: 200 TABLET ORAL at 09:11

## 2024-11-11 RX ADMIN — BUDESONIDE INHALATION 0.5 MG: 0.5 SUSPENSION RESPIRATORY (INHALATION) at 07:11

## 2024-11-11 RX ADMIN — ACETAMINOPHEN 650 MG: 325 TABLET ORAL at 12:11

## 2024-11-11 RX ADMIN — LOSARTAN POTASSIUM 12.5 MG: 25 TABLET, FILM COATED ORAL at 09:11

## 2024-11-11 RX ADMIN — RIVAROXABAN 20 MG: 20 TABLET, FILM COATED ORAL at 04:11

## 2024-11-11 RX ADMIN — ARFORMOTEROL TARTRATE 15 MCG: 15 SOLUTION RESPIRATORY (INHALATION) at 08:11

## 2024-11-11 RX ADMIN — SENNOSIDES AND DOCUSATE SODIUM 1 TABLET: 50; 8.6 TABLET ORAL at 09:11

## 2024-11-11 RX ADMIN — ASPIRIN 81 MG: 81 TABLET, COATED ORAL at 09:11

## 2024-11-11 RX ADMIN — EMPAGLIFLOZIN 10 MG: 10 TABLET, FILM COATED ORAL at 09:11

## 2024-11-11 NOTE — PT/OT/SLP PROGRESS
"Occupational Therapy   Treatment    Name: Layton Abrams  MRN: [id number]  Admitting Diagnosis:  Atrial fibrillation with RVR       Recommendations:     Discharge Recommendations: Moderate Intensity Therapy  Discharge Equipment Recommendations:  to be determined by next level of care  Barriers to discharge:  Decreased caregiver support    Assessment:     Layton Abrams is a 68 y.o. male with a medical diagnosis of Atrial fibrillation with RVR.  He presents with the following performance deficits affecting function are weakness, impaired endurance, impaired self care skills, impaired functional mobility, gait instability, impaired balance, impaired cognition, decreased coordination, decreased upper extremity function, decreased lower extremity function, decreased safety awareness, decreased ROM, impaired cardiopulmonary response to activity.     Rehab Prognosis:  Good; patient would benefit from acute skilled OT services to address these deficits and reach maximum level of function.       Plan:     Patient to be seen 2 x/week to address the above listed problems via self-care/home management, therapeutic activities, therapeutic exercises  Plan of Care Expires: 11/21/24  Plan of Care Reviewed with: patient    Subjective     Chief Complaint: "I'm too tired."  "No."  Patient/Family Comments/goals: "I'm going home today."  Pain/Comfort:  Pain Rating 1: 0/10    Objective:     Communicated with: Nurse and epic chart review prior to session.  Patient found HOB elevated with peripheral IV, telemetry, bed alarm upon OT entry to room.    General Precautions: Standard, fall    Orthopedic Precautions:N/A  Braces: N/A  Respiratory Status: Room air    AMPAC 6 Click ADL: 15    Treatment & Education:  Pt adamantly refusing to participate in OT session despite max encouragement. Offered BUE therex in bed, EOB activity, t/f to chair, and repositioning in bed but pt declining all. Reviewed role of OT in acute setting and benefits of " participation. Educated on techniques to use to increase independence and decrease fall risk with functional transfers. Educated on importance of OOB activity and calling for A to transfer and meet needs. Encouraged completion of B UE AROM therex throughout the day to tolerance to increase functional strength and activity tolerance. Educated patient on importance of increased tolerance to upright position and direct impact on CV endurance and strength. Patient encouraged to sit up in chair for a minimum of 2 consecutive hours per day and for all meals. Patient stated understanding and in agreement with POC.     Patient left HOB elevated with all lines intact, call button in reach, and bed alarm on    GOALS:   Multidisciplinary Problems       Occupational Therapy Goals          Problem: Occupational Therapy    Goal Priority Disciplines Outcome Interventions   Occupational Therapy Goal     OT, PT/OT Progressing    Description: Goals to be met by: 11/16/24     Patient will increase functional independence with ADLs by performing:    Toileting from bedside commode with Minimal Assistance for hygiene and clothing management.   Toilet transfer to bedside commode with Minimal Assistance.  Increased functional strength in B UE to grossly WFL.                         Time Tracking:     OT Date of Treatment: 11/11/24  OT Start Time: 1010  OT Stop Time: 1020  OT Total Time (min): 10 min    Billable Minutes:Therapeutic Activity 10    OT/ALFREDO: OT      Eloina Álvarez OT     11/11/2024

## 2024-11-11 NOTE — ASSESSMENT & PLAN NOTE
CARINA is likely due to  multiple factors, HF, afib, hypotension . Baseline creatinine is  0.7-1 . Most recent creatinine and eGFR are listed below.  Recent Labs     11/08/24  0439 11/09/24  0446 11/10/24  0504   CREATININE 1.4 1.3 1.4   EGFRNORACEVR 55* >60 55*        Plan  - CARINA is improving  - Avoid nephrotoxins and renally dose meds for GFR listed above  - Monitor urine output, serial BMP, and adjust therapy as needed  - appreciate nephrology

## 2024-11-11 NOTE — PT/OT/SLP PROGRESS
"Physical Therapy  Treatment    Layton Abrams   MRN: [id number]   Admitting Diagnosis: Atrial fibrillation with RVR    PT Received On: 11/11/24  PT Start Time: 1010     PT Stop Time: 1020    PT Total Time (min): 10 min       Billable Minutes:  Therapeutic Activity 10    Treatment Type: Treatment  PT/PTA: PTA     Number of PTA visits since last PT visit: 3       General Precautions: Standard, fall  Orthopedic Precautions: N/A  Braces: N/A  Respiratory Status: Room air    Spiritual, Cultural Beliefs, Sikh Practices, Values that Affect Care: no    Subjective:  Communicated with patient's nurse, Gris, and completed Epic chart review prior to session.  Patient adamantly declined to participate in PT session.   "I'm too tired."  "I'm going home today."    Pain/Comfort  Pain Rating 1: 0/10  Pain Rating Post-Intervention 1: 0/10    Objective:   Patient found with: peripheral IV, telemetry, bed alarm    Educated patient on importance of full and active participation in functional mobility training to prevent further loss of ROM and strength.   Encouraged patient to request assistance from nursing staff to get OOB at least 3x/day with all meals in addition to ambulating to/from the bathroom to promote recovery of CV endurance. Also encouraged patient to perform AROM TE to BLE throughout the day within all available planes of motion. Re enforced importance of utilizing call light to meet needs in room and not attempt to get up without staff assistance. Patient verbalized understanding of all education given and agreed to comply.    Patient declined supine TE, EOB, transfer to chair, repositioning and short distance gait training.     AM-PAC 6 CLICK MOBILITY  How much help from another person does this patient currently need?   1 = Unable, Total/Dependent Assistance  2 = A lot, Maximum/Moderate Assistance  3 = A little, Minimum/Contact Guard/Supervision  4 = None, Modified [place]/Independent    Turning over in bed " (including adjusting bedclothes, sheets and blankets)?: 1 (REF)  Sitting down on and standing up from a chair with arms (e.g., wheelchair, bedside commode, etc.): 1 (REF)  Moving from lying on back to sitting on the side of the bed?: 1 (REF)  Moving to and from a bed to a chair (including a wheelchair)?: 1 (REF)  Need to walk in hospital room?: 1 (REF)  Climbing 3-5 steps with a railing?: 1 (REF)  Basic Mobility Total Score: 6    AM-PAC Raw Score CMS G-Code Modifier Level of Impairment Assistance   6 % Total / Unable   7 - 9 CM 80 - 100% Maximal Assist   10 - 14 CL 60 - 80% Moderate Assist   15 - 19 CK 40 - 60% Moderate Assist   20 - 22 CJ 20 - 40% Minimal Assist   23 CI 1-20% SBA / CGA   24 CH 0% Independent/ Mod I     Patient left HOB elevated with call button in reach and bed alarm on.    Assessment:  Layton Abrams is a 68 y.o. male with a medical diagnosis of Atrial fibrillation with RVR and presents with overall decline in functional mobility. Patient would continue to benefit from skilled PT to address functional limitations listed below in order to return to PLOF/decrease caregiver burden.     Rehab identified problem list/impairments: weakness, impaired endurance, impaired self care skills, impaired functional mobility, gait instability, impaired balance, decreased safety awareness, decreased lower extremity function, decreased upper extremity function, decreased coordination, impaired cognition, decreased ROM, impaired cardiopulmonary response to activity    Rehab potential is fair.    Activity tolerance: unable to determine    Discharge recommendations: Moderate Intensity Therapy      Barriers to discharge:      Equipment recommendations: to be determined by next level of care     GOALS:   Multidisciplinary Problems       Physical Therapy Goals          Problem: Physical Therapy    Goal Priority Disciplines Outcome Interventions   Physical Therapy Goal     PT, PT/OT Progressing    Description:  Goals to be met by 11/15/24.  1. Pt will complete bed mobility MIN A.  2. Pt will complete sit to stand MIN A.  3. Pt will ambulate 50ft MIN A using RW.  4. Pt will increase AMPAC score by 2 points to progress functional mobility.                       PLAN:    Patient to be seen 3 x/week to address the above listed problems via gait training, therapeutic activities, therapeutic exercises  Plan of Care expires: 11/15/24  Plan of Care reviewed with: patient         11/11/2024

## 2024-11-11 NOTE — PROGRESS NOTES
[facility] Medicine  Progress Note    Patient Name: Layton Abrams  MRN: [id number]  Patient Class: IP- Inpatient   Admission Date: 10/29/2024  Length of Stay: 12 days  Attending Physician: David Gunderson DO  Primary Care Provider: Nellie Cadena MD        Subjective:     Principal Problem:Atrial fibrillation with RVR        HPI:  The patient is 68-year-old male with a history of atrial fibrillation, coronary artery disease with stenting, hypertension, stroke with right-sided deficits, COPD, HCV, left peroneal DVT, Xarelto use, and lower extremity cellulitis presented to [facility] Emergency Department on October 29 with weakness that began on the morning of presentation.  By report he had been feeling well the day prior but did not feel well on October 29.  In the emergency department he had AFib with RVR and hypotension, but oxygenation was stable on room air.  Patient was drowsy but would wake and speak without difficulty.  He reported no chest pain or shortness of breath.  Labs were concerning for CARINA with  and creatinine 5.38.  Potassium was 7.  High sensitivity troponin was in the normal range at 40.  Chest x-ray had clear lungs but did show marked cardiomegaly.  ED attempted cardioversion with the AFib with RVR, but the patient did not cardiovert.  By report, EKGs did not show evidence of significant peak T-waves. He received calcium, dextrose/insulin, lactated Ringer's, IV metoprolol, IV bicarbonate, and Lokelma.  Levophed infusion was initiated.  Blood pressure improved with the Levophed, but tachycardia persisted.  Patient has a PICC line in place.  He also had a Wilcox catheter placed and had a small amount of urine with catheter placement.  Case discussed with the ED doctor and with Critical Care Medicine at [facility].  Patient transfer to [facility] and admitted to the ICU. He was placed on bicarb and amiodarone infusions.         Overview/Hospital Course:  The patient presented with weakness that began on the morning of presentation. By report he had been feeling well the day prior but did not feel well on October 29. In the emergency department he had AFib with RVR and hypotension, but oxygenation was stable on room air. Patient was drowsy but would wake and speak without difficulty. He reported no chest pain or shortness of breath. Labs were concerning for CARINA with  and creatinine 5.38. Potassium was 7. High sensitivity troponin was in the normal range at 40. Chest x-ray had clear lungs but did show marked cardiomegaly. ED attempted cardioversion with the AFib with RVR, but the patient did not cardiovert. By report, EKGs did not show evidence of significant peak T-waves. He received calcium, dextrose/insulin, lactated Ringer's, IV metoprolol, IV bicarbonate, and Lokelma. Levophed infusion was initiated. Blood pressure improved with the Levophed, but tachycardia persisted. Patient has a PICC line in place. He also had a Wilcox catheter placed and had a small amount of urine with catheter placement. Case discussed with the ED doctor and with Critical Care Medicine at [facility]. Patient transfer to [facility] and admitted to the ICU. He was placed on bicarb and amiodarone infusions. He was weaned off of levophed.  His renal function improved. Arterial US revealed PAD and occluded sfa. Vascular surgery was consulted, and evaluation noted plans for outpatient follow up with left leg angiogram for possible intervention and right leg intervention in the near future as well.  Plan to follow up in 2-3 weeks after medical stabilization.  Pending SNF placement    Interval History: No acute events overnight, afebrile. Vitals concerning for bradycardia, but patient remains asymptomatic.      Objective:     Vital Signs (Most Recent):  Temp: 98 °F (36.7 °C) (11/10/24 1146)  Pulse: (!) 56 (11/10/24 1712)  Resp: 14 (11/10/24  1146)  BP: 110/62 (11/10/24 1146)  SpO2: 98 % (11/10/24 1146) Vital Signs (24h Range):  Temp:  [97.6 °F (36.4 °C)-98.8 °F (37.1 °C)] 98 °F (36.7 °C)  Pulse:  [50-95] 56  Resp:  [14-20] 14  SpO2:  [96 %-100 %] 98 %  BP: (110-144)/(58-89) 110/62     Weight: 82.6 kg (182 lb 1.6 oz)  Body mass index is 26.89 kg/m².    Intake/Output Summary (Last 24 hours) at 11/10/2024 1859  Last data filed at 11/10/2024 1741  Gross per 24 hour   Intake 1260 ml   Output 1495 ml   Net -235 ml         Physical Exam  Vitals and nursing note reviewed.   Constitutional:       General: He is not in acute distress.     Appearance: He is ill-appearing. He is not toxic-appearing or diaphoretic.   HENT:      Head: Normocephalic and atraumatic.      Mouth/Throat:      Mouth: Mucous membranes are moist.   Eyes:      General: No scleral icterus.        Right eye: No discharge.         Left eye: No discharge.   Cardiovascular:      Rate and Rhythm: Bradycardia present. Rhythm irregular.      Heart sounds: Normal heart sounds.   Pulmonary:      Effort: Pulmonary effort is normal. No respiratory distress.   Abdominal:      General: Bowel sounds are normal.      Tenderness: There is no abdominal tenderness.   Musculoskeletal:      Cervical back: No rigidity.      Right lower leg: No edema.      Left lower leg: No edema.   Skin:     General: Skin is warm and dry.      Coloration: Skin is not jaundiced.   Neurological:      Mental Status: He is alert and oriented to person, place, and time. Mental status is at baseline.   Psychiatric:         Mood and Affect: Mood normal.         Behavior: Behavior normal.             Significant Labs: All pertinent labs within the past 24 hours have been reviewed.  LABS:  Recent Labs   Lab 11/08/24 0439 11/09/24  0446 11/10/24  0504    137 137   K 4.9 4.7 5.0    111* 111*   CO2 19* 17* 19*   BUN 29* 29* 26*   CREATININE 1.4 1.3 1.4   GLU 80 95 70   ANIONGAP 9 9 7*     Recent Labs   Lab 11/08/24  0439  11/09/24  0446 11/10/24  0504   MG 2.0 1.9 1.8   PHOS 3.6 3.3 2.8     Recent Labs   Lab 11/08/24  0439 11/09/24  0446 11/10/24  0504   AST 28 23 24   ALT 19 18 17   ALKPHOS 48 47 50   BILITOT 0.3 0.3 0.3   ALBUMIN 3.2* 3.3* 3.3*     POCT Glucose:   Recent Labs   Lab 11/05/24  1144 11/06/24  1157 11/06/24  2158   POCTGLUCOSE 94 85 89    Recent Labs   Lab 11/08/24  0439 11/09/24  0446 11/10/24  0504   WBC 5.02 5.04 6.33   HGB 11.2* 11.4* 11.9*   HCT 36.3* 35.8* 38.3*    180 199   GRAN 59.9  3.0 66.5  3.4 66.9  4.2          Micro  Blood Cultures  Lab Results   Component Value Date    LABBLOO No growth after 5 days. 10/30/2024    LABBLOO No growth after 5 days. 10/30/2024       Significant Imaging: I have reviewed all pertinent imaging results/findings within the past 24 hours.  US Lower Extrem Arteries Bilat with GEOVANNY (xpd)   Final Result      1.  Markedly abnormal study.  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  There is monophasic flow involving all vessels imaged with marked plaque formation diffusely.  There are low velocities involving the bilateral popliteal arteries and trifurcation vessels.      2.  There are elevated velocities involving both profunda femoral arteries, consistent with significant stenosis.      3.  The bilateral ABIs are unable to be measured due to noncompliance ankle vessels.         Electronically signed by: Bentley Dumont MD   Date:    11/02/2024   Time:    18:56      US Kidney   Final Result      No hydronephrosis         Electronically signed by: Umair Grant MD   Date:    10/30/2024   Time:    11:06      X-Ray Chest AP Portable   Final Result      Right-sided PICC is satisfactory         Electronically signed by: Robin Sanchez   Date:    10/29/2024   Time:    22:46          Inpatient Medications:    Scheduled Meds:   amiodarone  200 mg Oral Daily    arformoteroL  15 mcg Nebulization BID    aspirin  81 mg Oral Daily    atorvastatin  80 mg  "Oral QHS    budesonide  0.5 mg Nebulization Q12H    empagliflozin  10 mg Oral Daily    famotidine  20 mg Oral BID    losartan  12.5 mg Oral Daily    metoprolol succinate  12.5 mg Oral Daily    rivaroxaban  20 mg Oral Daily with dinner    senna-docusate 8.6-50 mg  1 tablet Oral BID     PRN Meds:  Current Facility-Administered Medications:     acetaminophen, 650 mg, Oral, Q6H PRN    influenza (adjuvanted), 0.5 mL, Intramuscular, Prior to discharge    ondansetron, 8 mg, Intravenous, Q6H PRN    pneumoc 20-haylee conj-dip cr(PF), 0.5 mL, Intramuscular, vaccine x 1 dose      Assessment/Plan:      * Atrial fibrillation with RVR  Patient has paroxysmal (<7 days) atrial fibrillation. Patient is currently in sinus rhythm. [id number] Score: 1. The patients heart rate in the last 24 hours is as follows:  Pulse  Min: 50  Max: 95     Antiarrhythmics  amiodarone tablet 200 mg, Daily, Oral  metoprolol succinate (TOPROL-XL) 24 hr split tablet 12.5 mg, Daily, Oral    Anticoagulants  rivaroxaban tablet 20 mg, With dinner, Oral    Plan  - Replete lytes with a goal of K>4, Mg >2  - Patient is anticoagulated, see medications listed above.  - Patient's afib is currently controlled  - cardiology following        PAD (peripheral artery disease)  Lower extremity altered ultrasound noted "occlusion of the left mid and distal superficial femoral arteries "    -Vascular surgery was consulted, and evaluation noted plans for outpatient follow up with left leg angiogram for possible intervention and right leg intervention in the near future as well.  Plan to follow up in 2-3 weeks after medical stabilization.      -continue aspirin    HFrEF (heart failure with reduced ejection fraction)  Patient has Systolic (HFrEF) heart failure that is Acute on chronic. On presentation their CHF was decompensated. Evidence of decompensated CHF on presentation includes: elevated JVD and shortness of breath. The etiology of their decompensation is likely atrial " fibrillation . Most recent  echo results are listed below.      Latest ECHO  Results for orders placed during the hospital encounter of 10/29/24    Echo    Interpretation Summary    Left Ventricle: The left ventricle is normal in size. Ventricular mass is normal. Mildly increased wall thickness. There is concentric remodeling. There is normal systolic function with a visually estimated ejection fraction of 60 - 65%. Unable to assess diastolic function due to atrial fibrillation.    Right Ventricle: Severe right ventricular enlargement. Wall thickness is normal. Systolic function is moderately reduced.    Right Atrium: Right atrium is severely dilated.    Tricuspid Valve: There is severe regurgitation.    Pulmonic Valve: There is mild regurgitation.    Pulmonary Artery: There is moderate pulmonary hypertension. The estimated pulmonary artery systolic pressure is 59 mmHg.    IVC/SVC: Elevated venous pressure at 15 mmHg.    Current Heart Failure Medications  empagliflozin (Jardiance) tablet 10 mg, Daily, Oral  losartan split tablet 12.5 mg, Daily, Oral  metoprolol succinate (TOPROL-XL) 24 hr split tablet 12.5 mg, Daily, Oral    Plan  - Monitor strict I&Os and daily weights.    - Place on telemetry  - Low sodium diet  - Cardiology has been consulted  - The patient's volume status is improving but not at their baseline as indicated by elevated JVD            Panlobular emphysema  Patient's COPD is controlled currently.  Patient is currently off COPD Pathway. Continue scheduled inhalers Supplemental oxygen and monitor respiratory status closely.     Hyperkalemia  Hyperkalemia is likely due to CARINA.The patients most recent potassium results are listed below.  Recent Labs     11/08/24  0439 11/09/24  0446 11/10/24  0504   K 4.9 4.7 5.0       Plan  - Monitor for arrhythmias with EKG and/or continuous telemetry.               CARINA (acute kidney injury)  CARINA is likely due to  multiple factors, HF, afib, hypotension . Baseline  creatinine is  0.7-1 . Most recent creatinine and eGFR are listed below.  Recent Labs     11/08/24  0439 11/09/24  0446 11/10/24  0504   CREATININE 1.4 1.3 1.4   EGFRNORACEVR 55* >60 55*        Plan  - CARINA is improving  - Avoid nephrotoxins and renally dose meds for GFR listed above  - Monitor urine output, serial BMP, and adjust therapy as needed  - appreciate nephrology      VTE Risk Mitigation (From admission, onward)           Ordered     rivaroxaban tablet 20 mg  With dinner         11/05/24 1000                    Discharge Planning   WIL: 11/7/2024     Code Status: Full Code   Is the patient medically ready for discharge?:     Reason for patient still in hospital (select all that apply): Pending disposition  Discharge Plan A: Skilled Nursing Facility   Discharge Delays: None known at this time        David Gunderson DO  Department of Hospital Medicine   [facility] - Telemetry ([facility])      Voice recognition software was used in the creation of this note/communication and any sound-alike/typographical errors which may have occurred despite initial review prior to signing should be taken in context when interpreting.  If such errors prevent a clear understanding of the note/communication, please contact the provider/office for clarification.

## 2024-11-11 NOTE — PLAN OF CARE
11/11/24 1058   Post-Acute Status   Post-Acute Authorization Placement  (SNF)   Post-Acute Placement Status Pending payor review/awaiting authorization (if required)   Coverage [employer] Managed Medicare   Discharge Delays None known at this time   Discharge Plan   Discharge Plan A Skilled Nursing Facility       JANENE reached out to Maldonado, with [employer], checking on insurance authorization.  Per Maldonado, Patient's insurance authorization is still showing pending.  JANENE stated Patient is medically ready for DC today, with DC orders placed. JANENE requested Maldonado, let SW know if they get authorization today. Maldonado verbalized understanding.    JANENE will continue to follow and assist as needed.

## 2024-11-11 NOTE — SUBJECTIVE & OBJECTIVE
Interval History: No acute events overnight, afebrile. Vitals concerning for bradycardia, but patient remains asymptomatic.      Objective:     Vital Signs (Most Recent):  Temp: 98 °F (36.7 °C) (11/10/24 1146)  Pulse: (!) 56 (11/10/24 1712)  Resp: 14 (11/10/24 1146)  BP: 110/62 (11/10/24 1146)  SpO2: 98 % (11/10/24 1146) Vital Signs (24h Range):  Temp:  [97.6 °F (36.4 °C)-98.8 °F (37.1 °C)] 98 °F (36.7 °C)  Pulse:  [50-95] 56  Resp:  [14-20] 14  SpO2:  [96 %-100 %] 98 %  BP: (110-144)/(58-89) 110/62     Weight: 82.6 kg (182 lb 1.6 oz)  Body mass index is 26.89 kg/m².    Intake/Output Summary (Last 24 hours) at 11/10/2024 1859  Last data filed at 11/10/2024 1741  Gross per 24 hour   Intake 1260 ml   Output 1495 ml   Net -235 ml         Physical Exam  Vitals and nursing note reviewed.   Constitutional:       General: He is not in acute distress.     Appearance: He is ill-appearing. He is not toxic-appearing or diaphoretic.   HENT:      Head: Normocephalic and atraumatic.      Mouth/Throat:      Mouth: Mucous membranes are moist.   Eyes:      General: No scleral icterus.        Right eye: No discharge.         Left eye: No discharge.   Cardiovascular:      Rate and Rhythm: Bradycardia present. Rhythm irregular.      Heart sounds: Normal heart sounds.   Pulmonary:      Effort: Pulmonary effort is normal. No respiratory distress.   Abdominal:      General: Bowel sounds are normal.      Tenderness: There is no abdominal tenderness.   Musculoskeletal:      Cervical back: No rigidity.      Right lower leg: No edema.      Left lower leg: No edema.   Skin:     General: Skin is warm and dry.      Coloration: Skin is not jaundiced.   Neurological:      Mental Status: He is alert and oriented to person, place, and time. Mental status is at baseline.   Psychiatric:         Mood and Affect: Mood normal.         Behavior: Behavior normal.             Significant Labs: All pertinent labs within the past 24 hours have been  reviewed.  LABS:  Recent Labs   Lab 11/08/24  0439 11/09/24  0446 11/10/24  0504    137 137   K 4.9 4.7 5.0    111* 111*   CO2 19* 17* 19*   BUN 29* 29* 26*   CREATININE 1.4 1.3 1.4   GLU 80 95 70   ANIONGAP 9 9 7*     Recent Labs   Lab 11/08/24  0439 11/09/24  0446 11/10/24  0504   MG 2.0 1.9 1.8   PHOS 3.6 3.3 2.8     Recent Labs   Lab 11/08/24  0439 11/09/24  0446 11/10/24  0504   AST 28 23 24   ALT 19 18 17   ALKPHOS 48 47 50   BILITOT 0.3 0.3 0.3   ALBUMIN 3.2* 3.3* 3.3*     POCT Glucose:   Recent Labs   Lab 11/05/24  1144 11/06/24  1157 11/06/24  2158   POCTGLUCOSE 94 85 89    Recent Labs   Lab 11/08/24  0439 11/09/24  0446 11/10/24  0504   WBC 5.02 5.04 6.33   HGB 11.2* 11.4* 11.9*   HCT 36.3* 35.8* 38.3*    180 199   GRAN 59.9  3.0 66.5  3.4 66.9  4.2          Micro  Blood Cultures  Lab Results   Component Value Date    LABBLOO No growth after 5 days. 10/30/2024    LABBLOO No growth after 5 days. 10/30/2024       Significant Imaging: I have reviewed all pertinent imaging results/findings within the past 24 hours.  US Lower Extrem Arteries Bilat with GEOVANNY (xpd)   Final Result      1.  Markedly abnormal study.  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  There is monophasic flow involving all vessels imaged with marked plaque formation diffusely.  There are low velocities involving the bilateral popliteal arteries and trifurcation vessels.      2.  There are elevated velocities involving both profunda femoral arteries, consistent with significant stenosis.      3.  The bilateral ABIs are unable to be measured due to noncompliance ankle vessels.         Electronically signed by: Bentley Dumont MD   Date:    11/02/2024   Time:    18:56      US Kidney   Final Result      No hydronephrosis         Electronically signed by: Umair Grant MD   Date:    10/30/2024   Time:    11:06      X-Ray Chest AP Portable   Final Result      Right-sided PICC is satisfactory          Electronically signed by: Robin Sanchez   Date:    10/29/2024   Time:    22:46          Inpatient Medications:    Scheduled Meds:   amiodarone  200 mg Oral Daily    arformoteroL  15 mcg Nebulization BID    aspirin  81 mg Oral Daily    atorvastatin  80 mg Oral QHS    budesonide  0.5 mg Nebulization Q12H    empagliflozin  10 mg Oral Daily    famotidine  20 mg Oral BID    losartan  12.5 mg Oral Daily    metoprolol succinate  12.5 mg Oral Daily    rivaroxaban  20 mg Oral Daily with dinner    senna-docusate 8.6-50 mg  1 tablet Oral BID     PRN Meds:  Current Facility-Administered Medications:     acetaminophen, 650 mg, Oral, Q6H PRN    influenza (adjuvanted), 0.5 mL, Intramuscular, Prior to discharge    ondansetron, 8 mg, Intravenous, Q6H PRN    pneumoc 20-ahylee conj-dip cr(PF), 0.5 mL, Intramuscular, vaccine x 1 dose

## 2024-11-11 NOTE — PLAN OF CARE
A232/A232 SANTOS Abrams is a 68 y.o.male admitted on 10/29/2024 for Atrial fibrillation with RVR   Code Status: Full Code MRN: [id number]   Review of patient's allergies indicates:  No Known Allergies  Past Medical History:   Diagnosis Date    Arthritis     CHF (congestive heart failure)     COPD (chronic obstructive pulmonary disease)     Hypertension     Liver disease     Stroke       PRN meds    acetaminophen, 650 mg, Q6H PRN  influenza (adjuvanted), 0.5 mL, Prior to discharge  ondansetron, 8 mg, Q6H PRN  pneumoc 20-haylee conj-dip cr(PF), 0.5 mL, vaccine x 1 dose      Chart check completed. Will continue plan of care.     Awaiting placement at [facility] to follow up      Orientation: disoriented to, time  Rashad Coma Scale Score: 14     Lead Monitored: Lead II Rhythm: atrial rhythm Frequency/Ectopy: PVCs  Cardiac/Telemetry Box Number: [id number]  VTE Core Measure: Pharmacological prophylaxis initiated/maintained Last Bowel Movement: 11/09/24  Diet Renal  Voiding Characteristics: voids spontaneously without difficulty  Chris Score: 17  Fall Risk Score: 17  Accucheck []   Freq?      Lines/Drains/Airways       Peripheral Intravenous Line  Duration                  Peripheral IV - Single Lumen 11/06/24 1521 20 G Left;Posterior Forearm 4 days

## 2024-11-11 NOTE — ASSESSMENT & PLAN NOTE
Hyperkalemia is likely due to CARINA.The patients most recent potassium results are listed below.  Recent Labs     11/08/24  0439 11/09/24  0446 11/10/24  0504   K 4.9 4.7 5.0       Plan  - Monitor for arrhythmias with EKG and/or continuous telemetry.

## 2024-11-11 NOTE — ASSESSMENT & PLAN NOTE
Patient has paroxysmal (<7 days) atrial fibrillation. Patient is currently in sinus rhythm. [id number] Score: 1. The patients heart rate in the last 24 hours is as follows:  Pulse  Min: 50  Max: 95     Antiarrhythmics  amiodarone tablet 200 mg, Daily, Oral  metoprolol succinate (TOPROL-XL) 24 hr split tablet 12.5 mg, Daily, Oral    Anticoagulants  rivaroxaban tablet 20 mg, With dinner, Oral    Plan  - Replete lytes with a goal of K>4, Mg >2  - Patient is anticoagulated, see medications listed above.  - Patient's afib is currently controlled  - cardiology following

## 2024-11-11 NOTE — PLAN OF CARE
A232/A232 SANTOS Abrams is a 68 y.o.male admitted on 10/29/2024 for Atrial fibrillation with RVR   Code Status: Full Code MRN: [id number]   Review of patient's allergies indicates:  No Known Allergies  Past Medical History:   Diagnosis Date    Arthritis     CHF (congestive heart failure)     COPD (chronic obstructive pulmonary disease)     Hypertension     Liver disease     Stroke       PRN meds    acetaminophen, 650 mg, Q6H PRN  influenza (adjuvanted), 0.5 mL, Prior to discharge  ondansetron, 8 mg, Q6H PRN  pneumoc 20-haylee conj-dip cr(PF), 0.5 mL, vaccine x 1 dose      Chart check completed. Will continue plan of care.      Orientation: disoriented to, time  [place] Coma Scale Score: 14     Lead Monitored: Lead II Rhythm: atrial rhythm Frequency/Ectopy: PVCs  Cardiac/Telemetry Box Number: [id number]  VTE Core Measure: Pharmacological prophylaxis initiated/maintained Last Bowel Movement: 11/10/24  Diet Renal  Voiding Characteristics: voids spontaneously without difficulty  Chris Score: 17  Fall Risk Score: 17  Accucheck []   Freq?      Lines/Drains/Airways       Peripheral Intravenous Line  Duration                  Peripheral IV - Single Lumen 11/06/24 1521 20 G Left;Posterior Forearm 4 days

## 2024-11-12 LAB
ALBUMIN SERPL BCP-MCNC: 3.5 G/DL (ref 3.5–5.2)
ALP SERPL-CCNC: 53 U/L (ref 40–150)
ALT SERPL W/O P-5'-P-CCNC: 18 U/L (ref 10–44)
ANION GAP SERPL CALC-SCNC: 10 MMOL/L (ref 8–16)
AST SERPL-CCNC: 22 U/L (ref 10–40)
BASOPHILS # BLD AUTO: 0.03 K/UL (ref 0–0.2)
BASOPHILS NFR BLD: 0.5 % (ref 0–1.9)
BILIRUB SERPL-MCNC: 0.3 MG/DL (ref 0.1–1)
BUN SERPL-MCNC: 35 MG/DL (ref 8–23)
CALCIUM SERPL-MCNC: 8.9 MG/DL (ref 8.7–10.5)
CHLORIDE SERPL-SCNC: 107 MMOL/L (ref 95–110)
CO2 SERPL-SCNC: 20 MMOL/L (ref 23–29)
CREAT SERPL-MCNC: 1.5 MG/DL (ref 0.5–1.4)
DIFFERENTIAL METHOD BLD: ABNORMAL
EOSINOPHIL # BLD AUTO: 0.2 K/UL (ref 0–0.5)
EOSINOPHIL NFR BLD: 3.3 % (ref 0–8)
ERYTHROCYTE [DISTWIDTH] IN BLOOD BY AUTOMATED COUNT: 13.8 % (ref 11.5–14.5)
EST. GFR  (NO RACE VARIABLE): 51 ML/MIN/1.73 M^2
GLUCOSE SERPL-MCNC: 71 MG/DL (ref 70–110)
HCT VFR BLD AUTO: 40.8 % (ref 40–54)
HGB BLD-MCNC: 12.6 G/DL (ref 14–18)
IMM GRANULOCYTES # BLD AUTO: 0.01 K/UL (ref 0–0.04)
IMM GRANULOCYTES NFR BLD AUTO: 0.2 % (ref 0–0.5)
LYMPHOCYTES # BLD AUTO: 1.5 K/UL (ref 1–4.8)
LYMPHOCYTES NFR BLD: 24 % (ref 18–48)
MAGNESIUM SERPL-MCNC: 2 MG/DL (ref 1.6–2.6)
MCH RBC QN AUTO: 27.5 PG (ref 27–31)
MCHC RBC AUTO-ENTMCNC: 30.9 G/DL (ref 32–36)
MCV RBC AUTO: 89 FL (ref 82–98)
MONOCYTES # BLD AUTO: 0.5 K/UL (ref 0.3–1)
MONOCYTES NFR BLD: 7.9 % (ref 4–15)
NEUTROPHILS # BLD AUTO: 3.9 K/UL (ref 1.8–7.7)
NEUTROPHILS NFR BLD: 64.1 % (ref 38–73)
NRBC BLD-RTO: 0 /100 WBC
PHOSPHATE SERPL-MCNC: 3.2 MG/DL (ref 2.7–4.5)
PLATELET # BLD AUTO: 246 K/UL (ref 150–450)
PMV BLD AUTO: 8.9 FL (ref 9.2–12.9)
POTASSIUM SERPL-SCNC: 4.6 MMOL/L (ref 3.5–5.1)
PROT SERPL-MCNC: 7.1 G/DL (ref 6–8.4)
RBC # BLD AUTO: 4.58 M/UL (ref 4.6–6.2)
SODIUM SERPL-SCNC: 137 MMOL/L (ref 136–145)
WBC # BLD AUTO: 6.04 K/UL (ref 3.9–12.7)

## 2024-11-12 PROCEDURE — 97530 THERAPEUTIC ACTIVITIES: CPT

## 2024-11-12 PROCEDURE — 84100 ASSAY OF PHOSPHORUS: CPT | Performed by: NURSE PRACTITIONER

## 2024-11-12 PROCEDURE — 25000003 PHARM REV CODE 250: Performed by: STUDENT IN AN ORGANIZED HEALTH CARE EDUCATION/TRAINING PROGRAM

## 2024-11-12 PROCEDURE — 85025 COMPLETE CBC W/AUTO DIFF WBC: CPT | Performed by: NURSE PRACTITIONER

## 2024-11-12 PROCEDURE — 25000003 PHARM REV CODE 250: Performed by: INTERNAL MEDICINE

## 2024-11-12 PROCEDURE — 25000242 PHARM REV CODE 250 ALT 637 W/ HCPCS: Performed by: INTERNAL MEDICINE

## 2024-11-12 PROCEDURE — 25000242 PHARM REV CODE 250 ALT 637 W/ HCPCS: Performed by: NURSE PRACTITIONER

## 2024-11-12 PROCEDURE — 80053 COMPREHEN METABOLIC PANEL: CPT | Performed by: NURSE PRACTITIONER

## 2024-11-12 PROCEDURE — 94761 N-INVAS EAR/PLS OXIMETRY MLT: CPT

## 2024-11-12 PROCEDURE — 94640 AIRWAY INHALATION TREATMENT: CPT

## 2024-11-12 PROCEDURE — 83735 ASSAY OF MAGNESIUM: CPT | Performed by: NURSE PRACTITIONER

## 2024-11-12 PROCEDURE — 36415 COLL VENOUS BLD VENIPUNCTURE: CPT | Performed by: NURSE PRACTITIONER

## 2024-11-12 PROCEDURE — 25000003 PHARM REV CODE 250: Performed by: SPECIALIST

## 2024-11-12 PROCEDURE — 25000003 PHARM REV CODE 250: Performed by: NURSE PRACTITIONER

## 2024-11-12 PROCEDURE — 21400001 HC TELEMETRY ROOM

## 2024-11-12 RX ORDER — FAMOTIDINE 20 MG/1
20 TABLET, FILM COATED ORAL DAILY
Status: DISCONTINUED | OUTPATIENT
Start: 2024-11-13 | End: 2024-11-14

## 2024-11-12 RX ADMIN — ARFORMOTEROL TARTRATE 15 MCG: 15 SOLUTION RESPIRATORY (INHALATION) at 07:11

## 2024-11-12 RX ADMIN — EMPAGLIFLOZIN 10 MG: 10 TABLET, FILM COATED ORAL at 10:11

## 2024-11-12 RX ADMIN — RIVAROXABAN 15 MG: 15 TABLET, FILM COATED ORAL at 05:11

## 2024-11-12 RX ADMIN — SENNOSIDES AND DOCUSATE SODIUM 1 TABLET: 50; 8.6 TABLET ORAL at 10:11

## 2024-11-12 RX ADMIN — BUDESONIDE INHALATION 0.5 MG: 0.5 SUSPENSION RESPIRATORY (INHALATION) at 07:11

## 2024-11-12 RX ADMIN — AMIODARONE HYDROCHLORIDE 200 MG: 200 TABLET ORAL at 10:11

## 2024-11-12 RX ADMIN — FAMOTIDINE 20 MG: 20 TABLET ORAL at 10:11

## 2024-11-12 RX ADMIN — ATORVASTATIN CALCIUM 80 MG: 40 TABLET, FILM COATED ORAL at 10:11

## 2024-11-12 RX ADMIN — LOSARTAN POTASSIUM 12.5 MG: 25 TABLET, FILM COATED ORAL at 10:11

## 2024-11-12 RX ADMIN — ASPIRIN 81 MG: 81 TABLET, COATED ORAL at 10:11

## 2024-11-12 RX ADMIN — METOPROLOL SUCCINATE 12.5 MG: 25 TABLET, EXTENDED RELEASE ORAL at 10:11

## 2024-11-12 NOTE — ASSESSMENT & PLAN NOTE
Patient has paroxysmal (<7 days) atrial fibrillation. Patient is currently in sinus rhythm. [id number] Score: 1. The patients heart rate in the last 24 hours is as follows:  Pulse  Min: 49  Max: 75     Antiarrhythmics  amiodarone tablet 200 mg, Daily, Oral  metoprolol succinate (TOPROL-XL) 24 hr split tablet 12.5 mg, Daily, Oral    Anticoagulants  rivaroxaban tablet 20 mg, With dinner, Oral    Plan  - Replete lytes with a goal of K>4, Mg >2  - Patient is anticoagulated, see medications listed above.  - Patient's afib is currently controlled  - cardiology following

## 2024-11-12 NOTE — PHYSICIAN QUERY
Please clarify the conflicting documentation in regards to the TYPE of heart failure .      Acute on chronic systolic heart failure (HFrEF or HFmrEF)

## 2024-11-12 NOTE — PLAN OF CARE
11/12/24 1310   Post-Acute Status   Post-Acute Authorization Placement  (SNF)   Post-Acute Placement Status Pending payor review/awaiting authorization (if required)   Coverage [employer] Managed Medicare   Discharge Delays None known at this time   Discharge Plan   Discharge Plan A Skilled Nursing Facility       JANENE reached out to Maldonado, with [employer], checking on insurance authorization.  Per Maldonado, Patient's insurance authorization is still showing pending.     JANENE updated patient's daugher, Nasreen, that insurance authorization is still pending for Patient. Patient's daughter verbalized understanding.    SW will continue to follow and assist as needed.

## 2024-11-12 NOTE — PLAN OF CARE
Continue OT POC.  Pt refusing EOB/OOB activity despite max encouragement.   Agreeable to repositioning in bed. Max A x2 for supine scoot.

## 2024-11-12 NOTE — PT/OT/SLP PROGRESS
"Physical Therapy Treatment    Patient Name:  Layton Abrams   MRN:  [id number]    Recommendations:     Discharge Recommendations: Moderate Intensity Therapy  Discharge Equipment Recommendations: to be determined by next level of care  Barriers to discharge: Decreased caregiver support    Assessment:     Layton Abrams is a 68 y.o. male admitted with a medical diagnosis of Atrial fibrillation with RVR.  He presents with the following impairments/functional limitations: weakness, impaired endurance, impaired functional mobility, gait instability, impaired balance, decreased safety awareness, decreased lower extremity function, decreased ROM, impaired cardiopulmonary response to activity, decreased coordination.    Rehab Prognosis: Good; patient would benefit from acute skilled PT services to address these deficits and reach maximum level of function.    Recent Surgery: * No surgery found *      Plan:     During this hospitalization, patient to be seen 3 x/week to address the identified rehab impairments via gait training, therapeutic activities, therapeutic exercises and progress toward the following goals:    Plan of Care Expires:  11/15/24    Subjective     Chief Complaint: Pt reported "I don't want to get up, I'm good where I am." Pt laughing with every response.   Patient/Family Comments/goals: none stated  Pain/Comfort:  Pain Rating 1: 0/10      Objective:     Communicated with nurse Lyons and epic chart review prior to session.  Patient found supine with peripheral IV, telemetry, bed alarm upon PT entry to room.     General Precautions: Standard, fall  Orthopedic Precautions: N/A  Braces: N/A  Respiratory Status: Room air     Functional Mobility:  Bed Mobility  Supine Scooting: maximal assistance and of 2 persons  Pt refused all other functional mobility    AM-PAC 6 CLICK MOBILITY  Turning over in bed (including adjusting bedclothes, sheets and blankets)?: 1 (REF)  Sitting down on and standing up from a chair " "with arms (e.g., wheelchair, bedside commode, etc.): 1 (REF)  Moving from lying on back to sitting on the side of the bed?: 1 (REF)  Moving to and from a bed to a chair (including a wheelchair)?: 1 (REF)  Need to walk in hospital room?: 1 (REF)  Climbing 3-5 steps with a railing?: 1 (NT)  Basic Mobility Total Score: 6       Treatment & Education:  Reviewed role of PT in acute care and POC. Pt refused all OOB/EOB activity and supine TherEx, despite max encouragement from therapist. Educated on the importance of OOB/EOB activity for her recovery. Educated on importance of consistent participation with PT. Educated on importance of TherEx to maintain/regain strength, encouraged to complete supine TherEx (hip flex, hip abd/add, heel slides, quad sets, ankle pumps) throughout the day. Encouraged frequent position changes to reduce the risk of pressure injury. Encouraged to sit up in the chair for all meals. Reviewed "call don't fall" policy and increased risk of falling due to weakness, instructed to utilize call bell for assistance with all transfers. Pt agreeable to all requests.    Patient left with bed in chair position with all lines intact, call button in reach, and bed alarm on..    GOALS:   Multidisciplinary Problems       Physical Therapy Goals          Problem: Physical Therapy    Goal Priority Disciplines Outcome Interventions   Physical Therapy Goal     PT, PT/OT Progressing    Description: Goals to be met by 11/15/24.  1. Pt will complete bed mobility MIN A.  2. Pt will complete sit to stand MIN A.  3. Pt will ambulate 50ft MIN A using RW.  4. Pt will increase AMPAC score by 2 points to progress functional mobility.                       Time Tracking:     PT Received On: 11/12/24  PT Start Time: 1025     PT Stop Time: 1035  PT Total Time (min): 10 min     Billable Minutes: Therapeutic Activity 10min    Treatment Type: Treatment  PT/PTA: PT     Number of PTA visits since last PT visit: 0     11/12/2024  "

## 2024-11-12 NOTE — PROGRESS NOTES
[facility] Medicine  Progress Note    Patient Name: Layton Abrams  MRN: [id number]  Patient Class: IP- Inpatient   Admission Date: 10/29/2024  Length of Stay: 13 days  Attending Physician: David Gunderson DO  Primary Care Provider: Nellie Cadena MD        Subjective:     Principal Problem:Atrial fibrillation with RVR        HPI:  The patient is 68-year-old male with a history of atrial fibrillation, coronary artery disease with stenting, hypertension, stroke with right-sided deficits, COPD, HCV, left peroneal DVT, Xarelto use, and lower extremity cellulitis presented to [facility] Emergency Department on October 29 with weakness that began on the morning of presentation.  By report he had been feeling well the day prior but did not feel well on October 29.  In the emergency department he had AFib with RVR and hypotension, but oxygenation was stable on room air.  Patient was drowsy but would wake and speak without difficulty.  He reported no chest pain or shortness of breath.  Labs were concerning for CARINA with  and creatinine 5.38.  Potassium was 7.  High sensitivity troponin was in the normal range at 40.  Chest x-ray had clear lungs but did show marked cardiomegaly.  ED attempted cardioversion with the AFib with RVR, but the patient did not cardiovert.  By report, EKGs did not show evidence of significant peak T-waves. He received calcium, dextrose/insulin, lactated Ringer's, IV metoprolol, IV bicarbonate, and Lokelma.  Levophed infusion was initiated.  Blood pressure improved with the Levophed, but tachycardia persisted.  Patient has a PICC line in place.  He also had a Wilcox catheter placed and had a small amount of urine with catheter placement.  Case discussed with the ED doctor and with Critical Care Medicine at [facility].  Patient transfer to [facility] and admitted to the ICU. He was placed on bicarb and amiodarone infusions.         Overview/Hospital Course:  The patient presented with weakness that began on the morning of presentation. By report he had been feeling well the day prior but did not feel well on October 29. In the emergency department he had AFib with RVR and hypotension, but oxygenation was stable on room air. Patient was drowsy but would wake and speak without difficulty. He reported no chest pain or shortness of breath. Labs were concerning for CARINA with  and creatinine 5.38. Potassium was 7. High sensitivity troponin was in the normal range at 40. Chest x-ray had clear lungs but did show marked cardiomegaly. ED attempted cardioversion with the AFib with RVR, but the patient did not cardiovert. By report, EKGs did not show evidence of significant peak T-waves. He received calcium, dextrose/insulin, lactated Ringer's, IV metoprolol, IV bicarbonate, and Lokelma. Levophed infusion was initiated. Blood pressure improved with the Levophed, but tachycardia persisted. Patient has a PICC line in place. He also had a Wilcox catheter placed and had a small amount of urine with catheter placement. Case discussed with the ED doctor and with Critical Care Medicine at [facility]. Patient transfer to [facility] and admitted to the ICU. He was placed on bicarb and amiodarone infusions. He was weaned off of levophed.  His renal function improved. Arterial US revealed PAD and occluded sfa. Vascular surgery was consulted, and evaluation noted plans for outpatient follow up with left leg angiogram for possible intervention and right leg intervention in the near future as well.  Plan to follow up in 2-3 weeks after medical stabilization.  Vitals concerning for bradycardia at rest, but patient continues to remain asymptomatic.  Metoprolol dosage decreased.  Pending SNF placement    Interval History: No acute events overnight, afebrile. Vitals concerning for bradycardia, but patient remains asymptomatic.      Objective:      Vital Signs (Most Recent):  Temp: 98.4 °F (36.9 °C) (11/11/24 2020)  Pulse: 74 (11/11/24 2020)  Resp: 18 (11/11/24 2020)  BP: 104/67 (11/11/24 2020)  SpO2: 98 % (11/11/24 2020) Vital Signs (24h Range):  Temp:  [98 °F (36.7 °C)-98.4 °F (36.9 °C)] 98.4 °F (36.9 °C)  Pulse:  [49-75] 74  Resp:  [16-20] 18  SpO2:  [95 %-100 %] 98 %  BP: (104-117)/(58-79) 104/67     Weight: 82.6 kg (182 lb 1.6 oz)  Body mass index is 26.89 kg/m².    Intake/Output Summary (Last 24 hours) at 11/11/2024 2242  Last data filed at 11/11/2024 1716  Gross per 24 hour   Intake 850 ml   Output 775 ml   Net 75 ml         Physical Exam  Vitals and nursing note reviewed.   Constitutional:       General: He is not in acute distress.     Appearance: He is ill-appearing. He is not toxic-appearing or diaphoretic.   HENT:      Head: Normocephalic and atraumatic.      Mouth/Throat:      Mouth: Mucous membranes are moist.   Eyes:      General: No scleral icterus.        Right eye: No discharge.         Left eye: No discharge.   Cardiovascular:      Rate and Rhythm: Bradycardia present. Rhythm irregular.      Heart sounds: Normal heart sounds.   Pulmonary:      Effort: Pulmonary effort is normal. No respiratory distress.   Abdominal:      General: Bowel sounds are normal.      Tenderness: There is no abdominal tenderness.   Musculoskeletal:      Cervical back: No rigidity.      Right lower leg: No edema.      Left lower leg: No edema.   Skin:     General: Skin is warm and dry.      Coloration: Skin is not jaundiced.   Neurological:      Mental Status: He is alert and oriented to person, place, and time. Mental status is at baseline.   Psychiatric:         Mood and Affect: Mood normal.         Behavior: Behavior normal.             Significant Labs: All pertinent labs within the past 24 hours have been reviewed.  LABS:  Recent Labs   Lab 11/09/24  0446 11/10/24  0504 11/11/24  0529    137 136   K 4.7 5.0 4.5   * 111* 111*   CO2 17* 19* 19*    BUN 29* 26* 24*   CREATININE 1.3 1.4 1.4   GLU 95 70 77   ANIONGAP 9 7* 6*     Recent Labs   Lab 11/09/24  0446 11/10/24  0504 11/11/24  0529   MG 1.9 1.8 1.9   PHOS 3.3 2.8 3.1     Recent Labs   Lab 11/09/24  0446 11/10/24  0504 11/11/24  0529   AST 23 24 23   ALT 18 17 17   ALKPHOS 47 50 52   BILITOT 0.3 0.3 0.4   ALBUMIN 3.3* 3.3* 3.4*     POCT Glucose:   Recent Labs   Lab 11/05/24  1144 11/06/24  1157 11/06/24  2158   POCTGLUCOSE 94 85 89    Recent Labs   Lab 11/09/24  0446 11/10/24  0504 11/11/24  0529   WBC 5.04 6.33 5.35   HGB 11.4* 11.9* 11.9*   HCT 35.8* 38.3* 37.0*    199 216   GRAN 66.5  3.4 66.9  4.2 67.2  3.6          Micro  Blood Cultures  Lab Results   Component Value Date    LABBLOO No growth after 5 days. 10/30/2024    LABBLOO No growth after 5 days. 10/30/2024       Significant Imaging: I have reviewed all pertinent imaging results/findings within the past 24 hours.  US Lower Extrem Arteries Bilat with GEOVANNY (xpd)   Final Result      1.  Markedly abnormal study.  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  There is monophasic flow involving all vessels imaged with marked plaque formation diffusely.  There are low velocities involving the bilateral popliteal arteries and trifurcation vessels.      2.  There are elevated velocities involving both profunda femoral arteries, consistent with significant stenosis.      3.  The bilateral ABIs are unable to be measured due to noncompliance ankle vessels.         Electronically signed by: Bentley Dumont MD   Date:    11/02/2024   Time:    18:56      US Kidney   Final Result      No hydronephrosis         Electronically signed by: Umair Grant MD   Date:    10/30/2024   Time:    11:06      X-Ray Chest AP Portable   Final Result      Right-sided PICC is satisfactory         Electronically signed by: Robin Sanchez   Date:    10/29/2024   Time:    22:46          Inpatient Medications:    Scheduled Meds:   amiodarone   "200 mg Oral Daily    arformoteroL  15 mcg Nebulization BID    aspirin  81 mg Oral Daily    atorvastatin  80 mg Oral QHS    budesonide  0.5 mg Nebulization Q12H    empagliflozin  10 mg Oral Daily    famotidine  20 mg Oral BID    losartan  12.5 mg Oral Daily    metoprolol succinate  12.5 mg Oral Daily    rivaroxaban  20 mg Oral Daily with dinner    senna-docusate 8.6-50 mg  1 tablet Oral BID     PRN Meds:  Current Facility-Administered Medications:     acetaminophen, 650 mg, Oral, Q6H PRN    influenza (adjuvanted), 0.5 mL, Intramuscular, Prior to discharge    ondansetron, 8 mg, Intravenous, Q6H PRN    pneumoc 20-haylee conj-dip cr(PF), 0.5 mL, Intramuscular, vaccine x 1 dose      Assessment/Plan:      * Atrial fibrillation with RVR  Patient has paroxysmal (<7 days) atrial fibrillation. Patient is currently in sinus rhythm. [id number] Score: 1. The patients heart rate in the last 24 hours is as follows:  Pulse  Min: 49  Max: 75     Antiarrhythmics  amiodarone tablet 200 mg, Daily, Oral  metoprolol succinate (TOPROL-XL) 24 hr split tablet 12.5 mg, Daily, Oral    Anticoagulants  rivaroxaban tablet 20 mg, With dinner, Oral    Plan  - Replete lytes with a goal of K>4, Mg >2  - Patient is anticoagulated, see medications listed above.  - Patient's afib is currently controlled  - cardiology following        PAD (peripheral artery disease)  Lower extremity altered ultrasound noted "occlusion of the left mid and distal superficial femoral arteries "    -Vascular surgery was consulted, and evaluation noted plans for outpatient follow up with left leg angiogram for possible intervention and right leg intervention in the near future as well.  Plan to follow up in 2-3 weeks after medical stabilization.      -continue aspirin    HFrEF (heart failure with reduced ejection fraction)  Patient has Systolic (HFrEF) heart failure that is Acute on chronic. On presentation their CHF was decompensated. Evidence of decompensated CHF on " presentation includes: elevated JVD and shortness of breath. The etiology of their decompensation is likely atrial fibrillation . Most recent  echo results are listed below.      Latest ECHO  Results for orders placed during the hospital encounter of 10/29/24    Echo    Interpretation Summary    Left Ventricle: The left ventricle is normal in size. Ventricular mass is normal. Mildly increased wall thickness. There is concentric remodeling. There is normal systolic function with a visually estimated ejection fraction of 60 - 65%. Unable to assess diastolic function due to atrial fibrillation.    Right Ventricle: Severe right ventricular enlargement. Wall thickness is normal. Systolic function is moderately reduced.    Right Atrium: Right atrium is severely dilated.    Tricuspid Valve: There is severe regurgitation.    Pulmonic Valve: There is mild regurgitation.    Pulmonary Artery: There is moderate pulmonary hypertension. The estimated pulmonary artery systolic pressure is 59 mmHg.    IVC/SVC: Elevated venous pressure at 15 mmHg.    Current Heart Failure Medications  empagliflozin (Jardiance) tablet 10 mg, Daily, Oral  losartan split tablet 12.5 mg, Daily, Oral  metoprolol succinate (TOPROL-XL) 24 hr split tablet 12.5 mg, Daily, Oral    Plan  - Monitor strict I&Os and daily weights.    - Place on telemetry  - Low sodium diet  - Cardiology has been consulted  - The patient's volume status is improving but not at their baseline as indicated by elevated JVD            Panlobular emphysema  Patient's COPD is controlled currently.  Patient is currently off COPD Pathway. Continue scheduled inhalers Supplemental oxygen and monitor respiratory status closely.     Hyperkalemia  Hyperkalemia is likely due to CARINA.The patients most recent potassium results are listed below.  Recent Labs     11/09/24  0446 11/10/24  0504 11/11/24  0529   K 4.7 5.0 4.5       Plan  - Monitor for arrhythmias with EKG and/or continuous telemetry.                CARINA (acute kidney injury)  CARINA is likely due to  multiple factors, HF, afib, hypotension . Baseline creatinine is  0.7-1 . Most recent creatinine and eGFR are listed below.  Recent Labs     11/09/24  0446 11/10/24  0504 11/11/24  0529   CREATININE 1.3 1.4 1.4   EGFRNORACEVR >60 55* 55*        Plan  - CARINA is improving  - Avoid nephrotoxins and renally dose meds for GFR listed above  - Monitor urine output, serial BMP, and adjust therapy as needed  - appreciate nephrology      VTE Risk Mitigation (From admission, onward)           Ordered     rivaroxaban tablet 20 mg  With dinner         11/05/24 1000                    Discharge Planning   WIL: 11/11/2024     Code Status: Full Code   Is the patient medically ready for discharge?:     Reason for patient still in hospital (select all that apply): Pending disposition  Discharge Plan A: Skilled Nursing Facility   Discharge Delays: None known at this time              David Gunderson DO  Department of Hospital Medicine   [facility] - Telemetry ([facility])    Voice recognition software was used in the creation of this note/communication and any sound-alike/typographical errors which may have occurred despite initial review prior to signing should be taken in context when interpreting.  If such errors prevent a clear understanding of the note/communication, please contact the provider/office for clarification.

## 2024-11-12 NOTE — PLAN OF CARE
A232/A232 SANTOS Abrams is a 68 y.o.male admitted on 10/29/2024 for Atrial fibrillation with RVR   Code Status: Full Code MRN: [id number]   Review of patient's allergies indicates:  No Known Allergies  Past Medical History:   Diagnosis Date    Arthritis     CHF (congestive heart failure)     COPD (chronic obstructive pulmonary disease)     Hypertension     Liver disease     Stroke       PRN meds    acetaminophen, 650 mg, Q6H PRN  influenza (adjuvanted), 0.5 mL, Prior to discharge  ondansetron, 8 mg, Q6H PRN  pneumoc 20-haylee conj-dip cr(PF), 0.5 mL, vaccine x 1 dose      Chart check completed. Will continue plan of care.      Orientation: oriented x 4  [place] Coma Scale Score: 15     Lead Monitored: Lead II Rhythm: atrial rhythm Frequency/Ectopy: PVCs  Cardiac/Telemetry Box Number: [id number]  VTE Core Measure: Pharmacological prophylaxis initiated/maintained Last Bowel Movement: 11/11/24  Diet Renal  Voiding Characteristics: voids spontaneously without difficulty  Chris Score: 17  Fall Risk Score: 19  Accucheck []   Freq?      Lines/Drains/Airways       Peripheral Intravenous Line  Duration                  Peripheral IV - Single Lumen 11/06/24 1521 20 G Left;Posterior Forearm 5 days

## 2024-11-12 NOTE — ASSESSMENT & PLAN NOTE
CARINA is likely due to  multiple factors, HF, afib, hypotension . Baseline creatinine is  0.7-1 . Most recent creatinine and eGFR are listed below.  Recent Labs     11/09/24  0446 11/10/24  0504 11/11/24  0529   CREATININE 1.3 1.4 1.4   EGFRNORACEVR >60 55* 55*        Plan  - CARINA is improving  - Avoid nephrotoxins and renally dose meds for GFR listed above  - Monitor urine output, serial BMP, and adjust therapy as needed  - appreciate nephrology

## 2024-11-12 NOTE — ASSESSMENT & PLAN NOTE
Hyperkalemia is likely due to CARINA.The patients most recent potassium results are listed below.  Recent Labs     11/09/24  0446 11/10/24  0504 11/11/24  0529   K 4.7 5.0 4.5       Plan  - Monitor for arrhythmias with EKG and/or continuous telemetry.

## 2024-11-12 NOTE — SUBJECTIVE & OBJECTIVE
Interval History: No acute events overnight, afebrile. Vitals concerning for bradycardia, but patient remains asymptomatic.      Objective:     Vital Signs (Most Recent):  Temp: 98.4 °F (36.9 °C) (11/11/24 2020)  Pulse: 74 (11/11/24 2020)  Resp: 18 (11/11/24 2020)  BP: 104/67 (11/11/24 2020)  SpO2: 98 % (11/11/24 2020) Vital Signs (24h Range):  Temp:  [98 °F (36.7 °C)-98.4 °F (36.9 °C)] 98.4 °F (36.9 °C)  Pulse:  [49-75] 74  Resp:  [16-20] 18  SpO2:  [95 %-100 %] 98 %  BP: (104-117)/(58-79) 104/67     Weight: 82.6 kg (182 lb 1.6 oz)  Body mass index is 26.89 kg/m².    Intake/Output Summary (Last 24 hours) at 11/11/2024 2242  Last data filed at 11/11/2024 1716  Gross per 24 hour   Intake 850 ml   Output 775 ml   Net 75 ml         Physical Exam  Vitals and nursing note reviewed.   Constitutional:       General: He is not in acute distress.     Appearance: He is ill-appearing. He is not toxic-appearing or diaphoretic.   HENT:      Head: Normocephalic and atraumatic.      Mouth/Throat:      Mouth: Mucous membranes are moist.   Eyes:      General: No scleral icterus.        Right eye: No discharge.         Left eye: No discharge.   Cardiovascular:      Rate and Rhythm: Bradycardia present. Rhythm irregular.      Heart sounds: Normal heart sounds.   Pulmonary:      Effort: Pulmonary effort is normal. No respiratory distress.   Abdominal:      General: Bowel sounds are normal.      Tenderness: There is no abdominal tenderness.   Musculoskeletal:      Cervical back: No rigidity.      Right lower leg: No edema.      Left lower leg: No edema.   Skin:     General: Skin is warm and dry.      Coloration: Skin is not jaundiced.   Neurological:      Mental Status: He is alert and oriented to person, place, and time. Mental status is at baseline.   Psychiatric:         Mood and Affect: Mood normal.         Behavior: Behavior normal.             Significant Labs: All pertinent labs within the past 24 hours have been  reviewed.  LABS:  Recent Labs   Lab 11/09/24  0446 11/10/24  0504 11/11/24  0529    137 136   K 4.7 5.0 4.5   * 111* 111*   CO2 17* 19* 19*   BUN 29* 26* 24*   CREATININE 1.3 1.4 1.4   GLU 95 70 77   ANIONGAP 9 7* 6*     Recent Labs   Lab 11/09/24  0446 11/10/24  0504 11/11/24  0529   MG 1.9 1.8 1.9   PHOS 3.3 2.8 3.1     Recent Labs   Lab 11/09/24  0446 11/10/24  0504 11/11/24  0529   AST 23 24 23   ALT 18 17 17   ALKPHOS 47 50 52   BILITOT 0.3 0.3 0.4   ALBUMIN 3.3* 3.3* 3.4*     POCT Glucose:   Recent Labs   Lab 11/05/24  1144 11/06/24  1157 11/06/24  2158   POCTGLUCOSE 94 85 89    Recent Labs   Lab 11/09/24  0446 11/10/24  0504 11/11/24  0529   WBC 5.04 6.33 5.35   HGB 11.4* 11.9* 11.9*   HCT 35.8* 38.3* 37.0*    199 216   GRAN 66.5  3.4 66.9  4.2 67.2  3.6          Micro  Blood Cultures  Lab Results   Component Value Date    LABBLOO No growth after 5 days. 10/30/2024    LABBLOO No growth after 5 days. 10/30/2024       Significant Imaging: I have reviewed all pertinent imaging results/findings within the past 24 hours.  US Lower Extrem Arteries Bilat with GEOVANNY (xpd)   Final Result      1.  Markedly abnormal study.  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  There is monophasic flow involving all vessels imaged with marked plaque formation diffusely.  There are low velocities involving the bilateral popliteal arteries and trifurcation vessels.      2.  There are elevated velocities involving both profunda femoral arteries, consistent with significant stenosis.      3.  The bilateral ABIs are unable to be measured due to noncompliance ankle vessels.         Electronically signed by: Bentley Dumont MD   Date:    11/02/2024   Time:    18:56      US Kidney   Final Result      No hydronephrosis         Electronically signed by: Umair Grant MD   Date:    10/30/2024   Time:    11:06      X-Ray Chest AP Portable   Final Result      Right-sided PICC is satisfactory          Electronically signed by: Robin Sanchez   Date:    10/29/2024   Time:    22:46          Inpatient Medications:    Scheduled Meds:   amiodarone  200 mg Oral Daily    arformoteroL  15 mcg Nebulization BID    aspirin  81 mg Oral Daily    atorvastatin  80 mg Oral QHS    budesonide  0.5 mg Nebulization Q12H    empagliflozin  10 mg Oral Daily    famotidine  20 mg Oral BID    losartan  12.5 mg Oral Daily    metoprolol succinate  12.5 mg Oral Daily    rivaroxaban  20 mg Oral Daily with dinner    senna-docusate 8.6-50 mg  1 tablet Oral BID     PRN Meds:  Current Facility-Administered Medications:     acetaminophen, 650 mg, Oral, Q6H PRN    influenza (adjuvanted), 0.5 mL, Intramuscular, Prior to discharge    ondansetron, 8 mg, Intravenous, Q6H PRN    pneumoc 20-haylee conj-dip cr(PF), 0.5 mL, Intramuscular, vaccine x 1 dose

## 2024-11-12 NOTE — PT/OT/SLP PROGRESS
"Occupational Therapy   Treatment    Name: Layton Abrams  MRN: [id number]  Admitting Diagnosis:  Atrial fibrillation with RVR       Recommendations:     Discharge Recommendations: Moderate Intensity Therapy  Discharge Equipment Recommendations:  to be determined by next level of care  Barriers to discharge:  Decreased caregiver support    Assessment:     Layton Abrams is a 68 y.o. male with a medical diagnosis of Atrial fibrillation with RVR.  He presents with the following performance deficits affecting function are weakness, impaired endurance, impaired self care skills, impaired functional mobility, gait instability, impaired balance, impaired cognition, decreased coordination, decreased upper extremity function, decreased lower extremity function, decreased safety awareness, decreased ROM, impaired cardiopulmonary response to activity.     Rehab Prognosis:  Fair; patient would benefit from acute skilled OT services to address these deficits and reach maximum level of function.       Plan:     Patient to be seen 2 x/week to address the above listed problems via self-care/home management, therapeutic activities, therapeutic exercises  Plan of Care Expires: 11/21/24  Plan of Care Reviewed with: patient    Subjective     Chief Complaint: "I don't want to get up."  Patient/Family Comments/goals: none reported  Pain/Comfort:  Pain Rating 1: 0/10    Objective:     Communicated with: Nurse and epic chart review prior to session.  Patient found HOB elevated with peripheral IV, telemetry, bed alarm upon OT entry to room.    General Precautions: Standard, fall    Orthopedic Precautions:N/A  Braces: N/A  Respiratory Status: Room air     Occupational Performance:     Bed Mobility:    Supine scoot to HOB with Max A x2.    [facility] 6 Click ADL: 14    Treatment & Education:  Pt adamantly refusing to participate in OT session despite max encouragement. Pt laughing throughout conversation. Offered BUE therex in bed, EOB activity, " and t/f to chair but pt declining all. Agreeable to repositioning in bed at this time. Educated pt on importance of turning/repositioning in bed for pressure relief to prevent skin breakdown. Reviewed role of OT in acute setting and benefits of participation. Educated on techniques to use to increase independence and decrease fall risk with functional transfers. Educated on importance of OOB activity and calling for A to transfer and meet needs. Encouraged completion of B UE AROM therex throughout the day to tolerance to increase functional strength and activity tolerance; pt demonstrated understanding by performing x2 reps shoulder flexion, elbow flexion/ext, and digit flexion/ext. Educated patient on importance of increased tolerance to upright position and direct impact on CV endurance and strength. Patient encouraged to sit up in chair for a minimum of 2 consecutive hours per day and for all meals. Patient stated understanding and in agreement with POC.     Patient left with bed in chair position with all lines intact, call button in reach, bed alarm on, and nurse notified    GOALS:   Multidisciplinary Problems       Occupational Therapy Goals          Problem: Occupational Therapy    Goal Priority Disciplines Outcome Interventions   Occupational Therapy Goal     OT, PT/OT Progressing    Description: Goals to be met by: 11/16/24     Patient will increase functional independence with ADLs by performing:    Toileting from bedside commode with Minimal Assistance for hygiene and clothing management.   Toilet transfer to bedside commode with Minimal Assistance.  Increased functional strength in B UE to grossly WFL.                         Time Tracking:     OT Date of Treatment: 11/12/24  OT Start Time: 1030  OT Stop Time: 1040  OT Total Time (min): 10 min    Billable Minutes:Therapeutic Activity 10    OT/ALFREDO: OT      Eloina Álvarez OT     11/12/2024

## 2024-11-12 NOTE — PHYSICIAN QUERY
Due to conflicting documentation,  Please specify the type of atrial fibrillation.    Paroxysmal atrial fibrillation

## 2024-11-12 NOTE — PLAN OF CARE
Pt refused all OOB/EOB activity and supine TherEx, despite max encouragement from therapist. Educated on the importance of OOB/EOB activity for his recovery. Educated on importance of consistent participation with PT. Educated on importance of TherEx to maintain/regain strength, encouraged to complete supine TherEx (hip flex, hip abd/add, heel slides, quad sets, ankle pumps) throughout the day. Encouraged frequent position changes to reduce the risk of pressure injury. Encouraged to sit up in the chair for all meals. Recommending moderate intensity therapy upon d/c.

## 2024-11-12 NOTE — DISCHARGE INSTRUCTIONS
[facility]     [address] [place] [address]     [phone]       NURSING HOME ORDERS    11/12/2024    Admit to Nursing Home:  Skilled Bed                                                 Diagnoses:  Active Hospital Problems    Diagnosis  POA    *Atrial fibrillation with RVR [I48.91]  Yes    PAD (peripheral artery disease) [I73.9]  Yes    CARINA (acute kidney injury) [N17.9]  Yes    Hyperkalemia [E87.5]  Yes    Panlobular emphysema [J43.1]  Yes    HFrEF (heart failure with reduced ejection fraction) [I50.20]  Yes      Resolved Hospital Problems    Diagnosis Date Resolved POA    Hypotension [I95.9] 11/04/2024 Yes       Patient is homebound due to:  Atrial fibrillation with RVR    Allergies:Review of patient's allergies indicates:  No Known Allergies    Vitals:  Every shift (Skilled Nursing patients). Please report any concerns for persistent bradycardia to healthcare provider for evaluation of medications    Diet: Cardiac    LABS:  Per facility protocol   CMP, CBC each week for 1 week. Report results to healthcare provider at [facility]    Nursing Precautions:     - Fall precautions per nursing home protocol   - Decubitus precautions:        -  for positioning   - Pressure reducing foam mattress   - Turn patient every two hours. Use wedge pillows to anchor patient    CONSULTS:     Physical Therapy to evaluate and treat   Occupational Therapy to evaluate and treat   MISCELLANEOUS CARE:    Routine Skin for Bedridden Patients:  Apply moisture barrier cream to all    skin folds and wet areas in perineal area daily and after baths and     Medications: Please see AVS for most recent medications list

## 2024-11-12 NOTE — PROGRESS NOTES
Pharmacist Renal Dose Adjustment Note    Layton Abrams is a 68 y.o. male being treated with the medication Famotidine.     Patient Data:    Vital Signs (Most Recent):  Temp: 97.7 °F (36.5 °C) (11/12/24 1618)  Pulse: (!) 56 (11/12/24 1618)  Resp: 18 (11/12/24 1618)  BP: 120/71 (11/12/24 1618)  SpO2: 100 % (11/12/24 1618) Vital Signs (72h Range):  Temp:  [97.4 °F (36.3 °C)-98.8 °F (37.1 °C)]   Pulse:  [49-95]   Resp:  [14-20]   BP: ()/(57-89)   SpO2:  [95 %-100 %]      Recent Labs   Lab 11/10/24  0504 11/11/24  0529 11/12/24  0429   CREATININE 1.4 1.4 1.5*     Serum creatinine: 1.5 mg/dL (H) 11/12/24 0429  Estimated creatinine clearance: 47.8 mL/min (A)    Medication: Famotidine PO 20 mg twice daily will be changed to Famotidine PO 20 mg daily for CrCl 10 to 50 ml/min.     Thank you for allowing us to participate in this patient's care.     Jc Tomas, PharmTREMAINE 11/12/2024 4:37 PM

## 2024-11-13 LAB
ALBUMIN SERPL BCP-MCNC: 3.4 G/DL (ref 3.5–5.2)
ALP SERPL-CCNC: 56 U/L (ref 40–150)
ALT SERPL W/O P-5'-P-CCNC: 18 U/L (ref 10–44)
ANION GAP SERPL CALC-SCNC: 9 MMOL/L (ref 8–16)
AST SERPL-CCNC: 26 U/L (ref 10–40)
BASOPHILS # BLD AUTO: 0.02 K/UL (ref 0–0.2)
BASOPHILS NFR BLD: 0.3 % (ref 0–1.9)
BILIRUB SERPL-MCNC: 0.3 MG/DL (ref 0.1–1)
BUN SERPL-MCNC: 34 MG/DL (ref 8–23)
CALCIUM SERPL-MCNC: 8.7 MG/DL (ref 8.7–10.5)
CHLORIDE SERPL-SCNC: 108 MMOL/L (ref 95–110)
CO2 SERPL-SCNC: 19 MMOL/L (ref 23–29)
CREAT SERPL-MCNC: 1.5 MG/DL (ref 0.5–1.4)
DIFFERENTIAL METHOD BLD: ABNORMAL
EOSINOPHIL # BLD AUTO: 0.2 K/UL (ref 0–0.5)
EOSINOPHIL NFR BLD: 2.7 % (ref 0–8)
ERYTHROCYTE [DISTWIDTH] IN BLOOD BY AUTOMATED COUNT: 13.8 % (ref 11.5–14.5)
EST. GFR  (NO RACE VARIABLE): 51 ML/MIN/1.73 M^2
GLUCOSE SERPL-MCNC: 76 MG/DL (ref 70–110)
HCT VFR BLD AUTO: 39.6 % (ref 40–54)
HGB BLD-MCNC: 12.7 G/DL (ref 14–18)
IMM GRANULOCYTES # BLD AUTO: 0.02 K/UL (ref 0–0.04)
IMM GRANULOCYTES NFR BLD AUTO: 0.3 % (ref 0–0.5)
LYMPHOCYTES # BLD AUTO: 1.4 K/UL (ref 1–4.8)
LYMPHOCYTES NFR BLD: 17.6 % (ref 18–48)
MAGNESIUM SERPL-MCNC: 1.9 MG/DL (ref 1.6–2.6)
MCH RBC QN AUTO: 28.2 PG (ref 27–31)
MCHC RBC AUTO-ENTMCNC: 32.1 G/DL (ref 32–36)
MCV RBC AUTO: 88 FL (ref 82–98)
MONOCYTES # BLD AUTO: 0.6 K/UL (ref 0.3–1)
MONOCYTES NFR BLD: 7.8 % (ref 4–15)
NEUTROPHILS # BLD AUTO: 5.5 K/UL (ref 1.8–7.7)
NEUTROPHILS NFR BLD: 71.3 % (ref 38–73)
NRBC BLD-RTO: 0 /100 WBC
PHOSPHATE SERPL-MCNC: 3 MG/DL (ref 2.7–4.5)
PLATELET # BLD AUTO: 251 K/UL (ref 150–450)
PMV BLD AUTO: 9.2 FL (ref 9.2–12.9)
POTASSIUM SERPL-SCNC: 4.9 MMOL/L (ref 3.5–5.1)
PROT SERPL-MCNC: 7 G/DL (ref 6–8.4)
RBC # BLD AUTO: 4.51 M/UL (ref 4.6–6.2)
SODIUM SERPL-SCNC: 136 MMOL/L (ref 136–145)
WBC # BLD AUTO: 7.65 K/UL (ref 3.9–12.7)

## 2024-11-13 PROCEDURE — 83735 ASSAY OF MAGNESIUM: CPT | Performed by: NURSE PRACTITIONER

## 2024-11-13 PROCEDURE — 84100 ASSAY OF PHOSPHORUS: CPT | Performed by: NURSE PRACTITIONER

## 2024-11-13 PROCEDURE — 25000003 PHARM REV CODE 250: Performed by: INTERNAL MEDICINE

## 2024-11-13 PROCEDURE — 25000242 PHARM REV CODE 250 ALT 637 W/ HCPCS: Performed by: INTERNAL MEDICINE

## 2024-11-13 PROCEDURE — 94640 AIRWAY INHALATION TREATMENT: CPT

## 2024-11-13 PROCEDURE — 97530 THERAPEUTIC ACTIVITIES: CPT

## 2024-11-13 PROCEDURE — 25000242 PHARM REV CODE 250 ALT 637 W/ HCPCS: Performed by: NURSE PRACTITIONER

## 2024-11-13 PROCEDURE — 97110 THERAPEUTIC EXERCISES: CPT

## 2024-11-13 PROCEDURE — 25000003 PHARM REV CODE 250: Performed by: STUDENT IN AN ORGANIZED HEALTH CARE EDUCATION/TRAINING PROGRAM

## 2024-11-13 PROCEDURE — 80053 COMPREHEN METABOLIC PANEL: CPT | Performed by: NURSE PRACTITIONER

## 2024-11-13 PROCEDURE — 25000003 PHARM REV CODE 250: Performed by: NURSE PRACTITIONER

## 2024-11-13 PROCEDURE — 36415 COLL VENOUS BLD VENIPUNCTURE: CPT | Performed by: NURSE PRACTITIONER

## 2024-11-13 PROCEDURE — 97116 GAIT TRAINING THERAPY: CPT

## 2024-11-13 PROCEDURE — 21400001 HC TELEMETRY ROOM

## 2024-11-13 PROCEDURE — 25000003 PHARM REV CODE 250: Performed by: SPECIALIST

## 2024-11-13 PROCEDURE — 85025 COMPLETE CBC W/AUTO DIFF WBC: CPT | Performed by: NURSE PRACTITIONER

## 2024-11-13 PROCEDURE — 94761 N-INVAS EAR/PLS OXIMETRY MLT: CPT

## 2024-11-13 RX ADMIN — FAMOTIDINE 20 MG: 20 TABLET ORAL at 09:11

## 2024-11-13 RX ADMIN — SENNOSIDES AND DOCUSATE SODIUM 1 TABLET: 50; 8.6 TABLET ORAL at 09:11

## 2024-11-13 RX ADMIN — ATORVASTATIN CALCIUM 80 MG: 40 TABLET, FILM COATED ORAL at 08:11

## 2024-11-13 RX ADMIN — BUDESONIDE INHALATION 0.5 MG: 0.5 SUSPENSION RESPIRATORY (INHALATION) at 07:11

## 2024-11-13 RX ADMIN — LOSARTAN POTASSIUM 12.5 MG: 25 TABLET, FILM COATED ORAL at 09:11

## 2024-11-13 RX ADMIN — ARFORMOTEROL TARTRATE 15 MCG: 15 SOLUTION RESPIRATORY (INHALATION) at 07:11

## 2024-11-13 RX ADMIN — EMPAGLIFLOZIN 10 MG: 10 TABLET, FILM COATED ORAL at 09:11

## 2024-11-13 RX ADMIN — METOPROLOL SUCCINATE 12.5 MG: 25 TABLET, EXTENDED RELEASE ORAL at 09:11

## 2024-11-13 RX ADMIN — AMIODARONE HYDROCHLORIDE 200 MG: 200 TABLET ORAL at 09:11

## 2024-11-13 RX ADMIN — RIVAROXABAN 15 MG: 15 TABLET, FILM COATED ORAL at 05:11

## 2024-11-13 RX ADMIN — ASPIRIN 81 MG: 81 TABLET, COATED ORAL at 09:11

## 2024-11-13 RX ADMIN — SENNOSIDES AND DOCUSATE SODIUM 1 TABLET: 50; 8.6 TABLET ORAL at 08:11

## 2024-11-13 NOTE — ASSESSMENT & PLAN NOTE
CARINA is likely due to  multiple factors, HF, afib, hypotension . Baseline creatinine is  0.7-1 . Most recent creatinine and eGFR are listed below.  Recent Labs     11/10/24  0504 11/11/24  0529 11/12/24  0429   CREATININE 1.4 1.4 1.5*   EGFRNORACEVR 55* 55* 51*        Plan  - Avoid nephrotoxins and renally dose meds for GFR listed above  - Monitor urine output, serial BMP, and adjust therapy as needed  - appreciate nephrology

## 2024-11-13 NOTE — PT/OT/SLP PROGRESS
Occupational Therapy   Treatment    Name: Layton Abrams  MRN: [id number]  Admitting Diagnosis:  Atrial fibrillation with RVR       Recommendations:     Discharge Recommendations: Moderate Intensity Therapy  Discharge Equipment Recommendations:  to be determined by next level of care  Barriers to discharge:  Decreased caregiver support    Assessment:     Layton Abrams is a 68 y.o. male with a medical diagnosis of Atrial fibrillation with RVR. Performance deficits affecting function are weakness, gait instability, decreased upper extremity function, decreased ROM, decreased lower extremity function, impaired balance, impaired endurance, decreased safety awareness, pain, impaired self care skills, impaired functional mobility, decreased coordination.     Rehab Prognosis:  Good; patient would benefit from acute skilled OT services to address these deficits and reach maximum level of function.       Plan:     Patient to be seen 2 x/week to address the above listed problems via self-care/home management, therapeutic activities, therapeutic exercises  Plan of Care Expires: 11/21/24  Plan of Care Reviewed with: patient    Subjective     Chief Complaint: R LE discomfort  Patient/Family Comments/goals: Increase independence  Pain/Comfort:  Pain Rating 1: 0/10  Pain Rating Post-Intervention 1: 0/10    Objective:     Communicated with: Nurse prior to session.  Patient found supine with telemetry, peripheral IV, bed alarm upon OT entry to room.    General Precautions: Standard, fall    Orthopedic Precautions:N/A  Braces: N/A  Respiratory Status: Room air     Occupational Performance:     Bed Mobility:    Patient completed Rolling/Turning to Left with  stand by assistance  Patient completed Scooting/Bridging with stand by assistance  Patient completed Supine to Sit with stand by assistance     Functional Mobility/Transfers:  Patient completed Sit <> Stand Transfer with minimum assistance  with  rolling walker   Patient  completed Bed <> Chair Transfer using Stand Pivot technique with minimum assistance with rolling walker  Functional Mobility: Ambulated x20 feet with RW min A with increased time     [facility] 6 Click ADL: 15    Treatment & Education:  Pt very willing to participate today. Following ambulation, patient transferred to bedside chair min A with verbal cues for hand placement/safety. Pt performed seated BUE AROM exercises x10 reps in all planes to increase functional ROM and strength needed for daily activities. Pt encouraged to perform these exercises multiple times per day to continue to increase functional strength and ROM. Pt also encouraged to utilize call button for nursing assistance back to bed, as well as to assist with ambulation. Pt verbalized understanding.     Patient left up in chair with all lines intact, call button in reach, and chair alarm on    GOALS:   Multidisciplinary Problems       Occupational Therapy Goals          Problem: Occupational Therapy    Goal Priority Disciplines Outcome Interventions   Occupational Therapy Goal     OT, PT/OT Progressing    Description: Goals to be met by: 11/16/24     Patient will increase functional independence with ADLs by performing:    Toileting from bedside commode with Minimal Assistance for hygiene and clothing management.   Toilet transfer to bedside commode with Minimal Assistance.  Increased functional strength in B UE to grossly WFL.                         Time Tracking:     OT Date of Treatment: 11/13/24  OT Start Time: 1010  OT Stop Time: 1035  OT Total Time (min): 25 min    Billable Minutes:Therapeutic Activity 15  Therapeutic Exercise 10    MARILEE Jimenez  OT/ALFREDO: OT          11/13/2024

## 2024-11-13 NOTE — ASSESSMENT & PLAN NOTE
Hyperkalemia is likely due to CARINA.The patients most recent potassium results are listed below.  Recent Labs     11/10/24  0504 11/11/24  0529 11/12/24  0429   K 5.0 4.5 4.6       Plan  - Monitor for arrhythmias with EKG and/or continuous telemetry.

## 2024-11-13 NOTE — PLAN OF CARE
Pt tolerated interventions well. Required SBA for bed mobility, ambulated 20ft MIN A using RW. Recommending moderate intensity therapy.

## 2024-11-13 NOTE — PROGRESS NOTES
[facility] Medicine  Progress Note    Patient Name: Layton Abrams  MRN: [id number]  Patient Class: IP- Inpatient   Admission Date: 10/29/2024  Length of Stay: 14 days  Attending Physician: David Gunderson DO  Primary Care Provider: Nellie Cadena MD        Subjective:     Principal Problem:Atrial fibrillation with RVR        HPI:  The patient is 68-year-old male with a history of atrial fibrillation, coronary artery disease with stenting, hypertension, stroke with right-sided deficits, COPD, HCV, left peroneal DVT, Xarelto use, and lower extremity cellulitis presented to [facility] Emergency Department on October 29 with weakness that began on the morning of presentation.  By report he had been feeling well the day prior but did not feel well on October 29.  In the emergency department he had AFib with RVR and hypotension, but oxygenation was stable on room air.  Patient was drowsy but would wake and speak without difficulty.  He reported no chest pain or shortness of breath.  Labs were concerning for CARINA with  and creatinine 5.38.  Potassium was 7.  High sensitivity troponin was in the normal range at 40.  Chest x-ray had clear lungs but did show marked cardiomegaly.  ED attempted cardioversion with the AFib with RVR, but the patient did not cardiovert.  By report, EKGs did not show evidence of significant peak T-waves. He received calcium, dextrose/insulin, lactated Ringer's, IV metoprolol, IV bicarbonate, and Lokelma.  Levophed infusion was initiated.  Blood pressure improved with the Levophed, but tachycardia persisted.  Patient has a PICC line in place.  He also had a Wilcox catheter placed and had a small amount of urine with catheter placement.  Case discussed with the ED doctor and with Critical Care Medicine at [facility].  Patient transfer to [facility] and admitted to the ICU. He was placed on bicarb and amiodarone infusions.         Overview/Hospital Course:  The patient presented with weakness that began on the morning of presentation. By report he had been feeling well the day prior but did not feel well on October 29. In the emergency department he had AFib with RVR and hypotension, but oxygenation was stable on room air. Patient was drowsy but would wake and speak without difficulty. He reported no chest pain or shortness of breath. Labs were concerning for CARINA with  and creatinine 5.38. Potassium was 7. High sensitivity troponin was in the normal range at 40. Chest x-ray had clear lungs but did show marked cardiomegaly. ED attempted cardioversion with the AFib with RVR, but the patient did not cardiovert. By report, EKGs did not show evidence of significant peak T-waves. He received calcium, dextrose/insulin, lactated Ringer's, IV metoprolol, IV bicarbonate, and Lokelma. Levophed infusion was initiated. Blood pressure improved with the Levophed, but tachycardia persisted. Patient has a PICC line in place. He also had a Wilcox catheter placed and had a small amount of urine with catheter placement. Case discussed with the ED doctor and with Critical Care Medicine at [facility]. Patient transfer to [facility] and admitted to the ICU. He was placed on bicarb and amiodarone infusions. He was weaned off of levophed.  His renal function improved. Arterial US revealed PAD and occluded sfa. Vascular surgery was consulted, and evaluation noted plans for outpatient follow up with left leg angiogram for possible intervention and right leg intervention in the near future as well.  Plan to follow up in 2-3 weeks after medical stabilization.  Vitals concerning for bradycardia at rest, but patient continues to remain asymptomatic.  Metoprolol dosage decreased.  Pending SNF placement    Interval History: No acute events overnight, afebrile. Vitals concerning for bradycardia, but patient remains asymptomatic.      Objective:      Vital Signs (Most Recent):  Temp: 98.5 °F (36.9 °C) (11/12/24 2008)  Pulse: (!) 54 (11/12/24 2008)  Resp: 18 (11/12/24 2008)  BP: (!) 100/56 (11/12/24 2008)  SpO2: 100 % (11/12/24 2008) Vital Signs (24h Range):  Temp:  [97.4 °F (36.3 °C)-98.6 °F (37 °C)] 98.5 °F (36.9 °C)  Pulse:  [54-78] 54  Resp:  [18-20] 18  SpO2:  [96 %-100 %] 100 %  BP: (100-120)/(56-77) 100/56     Weight: 80.1 kg (176 lb 9.4 oz)  Body mass index is 26.08 kg/m².    Intake/Output Summary (Last 24 hours) at 11/12/2024 2025  Last data filed at 11/12/2024 1200  Gross per 24 hour   Intake 780 ml   Output 1070 ml   Net -290 ml         Physical Exam  Vitals and nursing note reviewed.   Constitutional:       General: He is not in acute distress.     Appearance: He is ill-appearing. He is not toxic-appearing or diaphoretic.   HENT:      Head: Normocephalic and atraumatic.      Mouth/Throat:      Mouth: Mucous membranes are moist.   Eyes:      General: No scleral icterus.        Right eye: No discharge.         Left eye: No discharge.   Cardiovascular:      Rate and Rhythm: Bradycardia present. Rhythm irregular.      Heart sounds: Normal heart sounds.   Pulmonary:      Effort: Pulmonary effort is normal. No respiratory distress.   Abdominal:      General: Bowel sounds are normal.      Tenderness: There is no abdominal tenderness.   Musculoskeletal:      Cervical back: No rigidity.      Right lower leg: No edema.      Left lower leg: No edema.   Skin:     General: Skin is warm and dry.      Coloration: Skin is not jaundiced.   Neurological:      Mental Status: He is alert and oriented to person, place, and time. Mental status is at baseline.   Psychiatric:         Mood and Affect: Mood normal.         Behavior: Behavior normal.             Significant Labs: All pertinent labs within the past 24 hours have been reviewed.  LABS:  Recent Labs   Lab 11/10/24  0504 11/11/24  0529 11/12/24  0429    136 137   K 5.0 4.5 4.6   * 111* 107   CO2 19*  19* 20*   BUN 26* 24* 35*   CREATININE 1.4 1.4 1.5*   GLU 70 77 71   ANIONGAP 7* 6* 10     Recent Labs   Lab 11/10/24  0504 11/11/24  0529 11/12/24  0429   MG 1.8 1.9 2.0   PHOS 2.8 3.1 3.2     Recent Labs   Lab 11/10/24  0504 11/11/24  0529 11/12/24  0429   AST 24 23 22   ALT 17 17 18   ALKPHOS 50 52 53   BILITOT 0.3 0.4 0.3   ALBUMIN 3.3* 3.4* 3.5     POCT Glucose:   Recent Labs   Lab 11/06/24  1157 11/06/24  2158   POCTGLUCOSE 85 89    Recent Labs   Lab 11/10/24  0504 11/11/24  0529 11/12/24  0429   WBC 6.33 5.35 6.04   HGB 11.9* 11.9* 12.6*   HCT 38.3* 37.0* 40.8    216 246   GRAN 66.9  4.2 67.2  3.6 64.1  3.9          Micro  Blood Cultures  Lab Results   Component Value Date    LABBLOO No growth after 5 days. 10/30/2024    LABBLOO No growth after 5 days. 10/30/2024       Significant Imaging: I have reviewed all pertinent imaging results/findings within the past 24 hours.  US Lower Extrem Arteries Bilat with GEOVANNY (xpd)   Final Result      1.  Markedly abnormal study.  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  There is monophasic flow involving all vessels imaged with marked plaque formation diffusely.  There are low velocities involving the bilateral popliteal arteries and trifurcation vessels.      2.  There are elevated velocities involving both profunda femoral arteries, consistent with significant stenosis.      3.  The bilateral ABIs are unable to be measured due to noncompliance ankle vessels.         Electronically signed by: Bentley Dumont MD   Date:    11/02/2024   Time:    18:56      US Kidney   Final Result      No hydronephrosis         Electronically signed by: Umair Grant MD   Date:    10/30/2024   Time:    11:06      X-Ray Chest AP Portable   Final Result      Right-sided PICC is satisfactory         Electronically signed by: Robin Sanchez   Date:    10/29/2024   Time:    22:46          Inpatient Medications:    Scheduled Meds:   amiodarone  200 mg  "Oral Daily    arformoteroL  15 mcg Nebulization BID    aspirin  81 mg Oral Daily    atorvastatin  80 mg Oral QHS    budesonide  0.5 mg Nebulization Q12H    empagliflozin  10 mg Oral Daily    [START ON 11/13/2024] famotidine  20 mg Oral Daily    losartan  12.5 mg Oral Daily    metoprolol succinate  12.5 mg Oral Daily    rivaroxaban  15 mg Oral Daily with dinner    senna-docusate 8.6-50 mg  1 tablet Oral BID     PRN Meds:  Current Facility-Administered Medications:     acetaminophen, 650 mg, Oral, Q6H PRN    influenza (adjuvanted), 0.5 mL, Intramuscular, Prior to discharge    ondansetron, 8 mg, Intravenous, Q6H PRN    pneumoc 20-haylee conj-dip cr(PF), 0.5 mL, Intramuscular, vaccine x 1 dose      Assessment/Plan:      * Atrial fibrillation with RVR  Patient has paroxysmal (<7 days) atrial fibrillation. Patient is currently in sinus rhythm. [id number] Score: 1. The patients heart rate in the last 24 hours is as follows:  Pulse  Min: 54  Max: 78     Antiarrhythmics  amiodarone tablet 200 mg, Daily, Oral  metoprolol succinate (TOPROL-XL) 24 hr split tablet 12.5 mg, Daily, Oral    Anticoagulants  rivaroxaban tablet 15 mg, With dinner, Oral    Plan  - Replete lytes with a goal of K>4, Mg >2  - Patient is anticoagulated, see medications listed above.  - Patient's afib is currently controlled  - cardiology following        PAD (peripheral artery disease)  Lower extremity altered ultrasound noted "occlusion of the left mid and distal superficial femoral arteries "    -Vascular surgery was consulted, and evaluation noted plans for outpatient follow up with left leg angiogram for possible intervention and right leg intervention in the near future as well.  Plan to follow up in 2-3 weeks after medical stabilization.      -continue aspirin    HFrEF (heart failure with reduced ejection fraction)  Patient has Systolic (HFrEF) heart failure that is Acute on chronic. On presentation their CHF was decompensated. Evidence of decompensated " CHF on presentation includes: elevated JVD and shortness of breath. The etiology of their decompensation is likely atrial fibrillation . Most recent  echo results are listed below.      Latest ECHO  Results for orders placed during the hospital encounter of 10/29/24    Echo    Interpretation Summary    Left Ventricle: The left ventricle is normal in size. Ventricular mass is normal. Mildly increased wall thickness. There is concentric remodeling. There is normal systolic function with a visually estimated ejection fraction of 60 - 65%. Unable to assess diastolic function due to atrial fibrillation.    Right Ventricle: Severe right ventricular enlargement. Wall thickness is normal. Systolic function is moderately reduced.    Right Atrium: Right atrium is severely dilated.    Tricuspid Valve: There is severe regurgitation.    Pulmonic Valve: There is mild regurgitation.    Pulmonary Artery: There is moderate pulmonary hypertension. The estimated pulmonary artery systolic pressure is 59 mmHg.    IVC/SVC: Elevated venous pressure at 15 mmHg.    Current Heart Failure Medications  empagliflozin (Jardiance) tablet 10 mg, Daily, Oral  losartan split tablet 12.5 mg, Daily, Oral  metoprolol succinate (TOPROL-XL) 24 hr split tablet 12.5 mg, Daily, Oral    Plan  - Monitor strict I&Os and daily weights.    - Place on telemetry  - Low sodium diet  - Cardiology has been consulted  - The patient's volume status is improving but not at their baseline as indicated by elevated JVD            Panlobular emphysema  Patient's COPD is controlled currently.  Patient is currently off COPD Pathway. Continue scheduled inhalers Supplemental oxygen and monitor respiratory status closely.     Hyperkalemia  Hyperkalemia is likely due to CARINA.The patients most recent potassium results are listed below.  Recent Labs     11/10/24  0504 11/11/24  0529 11/12/24  0429   K 5.0 4.5 4.6       Plan  - Monitor for arrhythmias with EKG and/or continuous  telemetry.               CARINA (acute kidney injury)  CARINA is likely due to  multiple factors, HF, afib, hypotension . Baseline creatinine is  0.7-1 . Most recent creatinine and eGFR are listed below.  Recent Labs     11/10/24  0504 11/11/24  0529 11/12/24  0429   CREATININE 1.4 1.4 1.5*   EGFRNORACEVR 55* 55* 51*        Plan  - Avoid nephrotoxins and renally dose meds for GFR listed above  - Monitor urine output, serial BMP, and adjust therapy as needed  - appreciate nephrology      VTE Risk Mitigation (From admission, onward)           Ordered     rivaroxaban tablet 15 mg  With dinner         11/12/24 1155                    Discharge Planning   WIL: 11/12/2024     Code Status: Full Code   Is the patient medically ready for discharge?:     Reason for patient still in hospital (select all that apply): Pending disposition  Discharge Plan A: Skilled Nursing Facility   Discharge Delays: None known at this time              David Gunderson DO  Department of Hospital Medicine   [facility] - Telemetry ([facility])    Voice recognition software was used in the creation of this note/communication and any sound-alike/typographical errors which may have occurred despite initial review prior to signing should be taken in context when interpreting.  If such errors prevent a clear understanding of the note/communication, please contact the provider/office for clarification.

## 2024-11-13 NOTE — PLAN OF CARE
A232/A232 SANTOS Abrams is a 68 y.o.male admitted on 10/29/2024 for Atrial fibrillation with RVR   Code Status: Full Code MRN: [id number]   Review of patient's allergies indicates:  No Known Allergies  Past Medical History:   Diagnosis Date    Arthritis     CHF (congestive heart failure)     COPD (chronic obstructive pulmonary disease)     Hypertension     Liver disease     Stroke       PRN meds    acetaminophen, 650 mg, Q6H PRN  influenza (adjuvanted), 0.5 mL, Prior to discharge  ondansetron, 8 mg, Q6H PRN  pneumoc 20-haylee conj-dip cr(PF), 0.5 mL, vaccine x 1 dose      Chart check completed. Will continue plan of care.      Orientation: oriented x 4  Katy Coma Scale Score: 15     Lead Monitored: Lead II Rhythm: sinus bradycardia Frequency/Ectopy: PVCs  Cardiac/Telemetry Box Number: [id number]  VTE Core Measure: Pharmacological prophylaxis initiated/maintained Last Bowel Movement: 11/11/24  Diet Renal  Voiding Characteristics: voids spontaneously without difficulty  Chris Score: 15  Fall Risk Score: 19  Accucheck []   Freq?      Lines/Drains/Airways       Peripheral Intravenous Line  Duration                  Peripheral IV - Single Lumen 11/06/24 1521 20 G Left;Posterior Forearm 6 days

## 2024-11-13 NOTE — ASSESSMENT & PLAN NOTE
Patient has paroxysmal (<7 days) atrial fibrillation. Patient is currently in sinus rhythm. [id number] Score: 1. The patients heart rate in the last 24 hours is as follows:  Pulse  Min: 54  Max: 78     Antiarrhythmics  amiodarone tablet 200 mg, Daily, Oral  metoprolol succinate (TOPROL-XL) 24 hr split tablet 12.5 mg, Daily, Oral    Anticoagulants  rivaroxaban tablet 15 mg, With dinner, Oral    Plan  - Replete lytes with a goal of K>4, Mg >2  - Patient is anticoagulated, see medications listed above.  - Patient's afib is currently controlled  - cardiology following

## 2024-11-13 NOTE — PT/OT/SLP PROGRESS
Physical Therapy Treatment    Patient Name:  Layton Abrams   MRN:  [id number]    Recommendations:     Discharge Recommendations: Moderate Intensity Therapy  Discharge Equipment Recommendations: to be determined by next level of care  Barriers to discharge: Decreased caregiver support    Assessment:     Layton Abrams is a 68 y.o. male admitted with a medical diagnosis of Atrial fibrillation with RVR.  He presents with the following impairments/functional limitations: weakness, impaired endurance, impaired functional mobility, gait instability, impaired balance, pain, decreased safety awareness, decreased lower extremity function, decreased coordination, decreased ROM.    Rehab Prognosis: Good; patient would benefit from acute skilled PT services to address these deficits and reach maximum level of function.    Recent Surgery: * No surgery found *      Plan:     During this hospitalization, patient to be seen 3 x/week to address the identified rehab impairments via gait training, therapeutic activities, therapeutic exercises and progress toward the following goals:    Plan of Care Expires:  11/15/24    Subjective     Chief Complaint: Pt is motivated to participate  Patient/Family Comments/goals: none stated   Pain/Comfort:  Pain Rating 1: 0/10 (at rest)  Location - Side 1: Bilateral  Location - Orientation 1: generalized  Location 1: leg  Pain Addressed 1: Reposition, Distraction  Pain Rating Post-Intervention 1:  (pain with movement, not rated)    Objective:     Communicated with nurse and epic chart review prior to session.  Patient found supine with peripheral IV, telemetry, bed alarm, pressure relief boots upon PT entry to room.     General Precautions: Standard, fall  Orthopedic Precautions: N/A  Braces: N/A  Respiratory Status: Room air     Functional Mobility:  Gait belt applied - Yes  Bed Mobility  Rolling Left: stand by assistance  Scooting: stand by assistance  Supine to Sit: stand by  "assistance  Transfers  Sit to Stand: minimum assistance with rolling walker, from raised bed  Bed to Chair: minimum assistance with rolling walker using Step Transfer  Gait  Patient ambulated 20ft with rolling walker and minimum assistance. Patient demonstrates unsteady gait, R LE genu varum. No c/o dizziness or SOB. All lines remained intact throughout ambulation trial.  Balance  Sitting: contact guard assistance  Standing: minimum assistance    Therapeutic Exercise  Patient performed 1 set(s) of 10 repetitions of the following seated exercises: ankle pumps, long arc quads, and marches for bilateral LE. ROM as tolerated due to pain.  Patient required skilled PT for instruction of exercises and appropriate cues to perform exercises safely and appropriately.      AM-PAC 6 CLICK MOBILITY  Turning over in bed (including adjusting bedclothes, sheets and blankets)?: 3  Sitting down on and standing up from a chair with arms (e.g., wheelchair, bedside commode, etc.): 3  Moving from lying on back to sitting on the side of the bed?: 3  Moving to and from a bed to a chair (including a wheelchair)?: 3  Need to walk in hospital room?: 3  Climbing 3-5 steps with a railing?: 1 (NT)  Basic Mobility Total Score: 16       Treatment & Education:  Reviewed role of PT in acute care and POC. Pt tolerated interventions well. Reviewed importance of OOB activities, activity pacing, and HEP (marching/hip flex, hip abd, heel slides/LAQ, quad sets, ankle pumps) in order to maintain/regain strength. Encouraged to sit up in chair for all meals. Reviewed proper use of RW for safety and to reduce risk of falling. Reviewed "call don't fall" policy and increased risk of falling due to weakness, instructed to utilize call bell for assistance with all transfers. Pt agreeable to all requests.    Patient left up in chair with all lines intact, call button in reach, and chair alarm on..    GOALS:   Multidisciplinary Problems       Physical Therapy Goals "          Problem: Physical Therapy    Goal Priority Disciplines Outcome Interventions   Physical Therapy Goal     PT, PT/OT Progressing    Description: Goals to be met by 11/15/24.  1. Pt will complete bed mobility MIN A.  2. Pt will complete sit to stand MIN A.  3. Pt will ambulate 50ft MIN A using RW.  4. Pt will increase AMPAC score by 2 points to progress functional mobility.                       Time Tracking:     PT Received On: 11/13/24  PT Start Time: 1011     PT Stop Time: 1034  PT Total Time (min): 23 min     Billable Minutes: Gait Training 13min and Therapeutic Exercise 10min    Treatment Type: Treatment  PT/PTA: PT     Number of PTA visits since last PT visit: 0     11/13/2024

## 2024-11-13 NOTE — PLAN OF CARE
JANENE spoke to Maldonado, at [facility], regarding authorization for Patient.  Per Maldonado, she checked the portal and they did deny patient for SNF care. Maldonado stated she is going to look into reason for denial because they did not send a letter, per usual protocol.  JANENE requested Maldonado to let her know reasons for denial and if P2P is able to be offered for admission.  Maldonado stated she would look into it and give JANENE a call back.    13 03 JANENE reached out to Maldonado, with [facility], regarding Patient's insurance denying SNF placement. Maldonado stated that insurance need a reason why SNF medical necessary for Patient. Maldonado stated she will submit an appeal for the denial once the official denial letter comes from Patient's t insurance. Maldonado stated she will send in information to appeal the decision.   JANENE contacted patient's daughter to see if they wished to appeal the SNF denial or if they wished to take the Patient home. Patient's daughter stated they did wish to peruse the appeal to the SNF. Patient's daughter stated patient's father and nephew would be help at home, and verbalized understanding if SNF was denied they would have to get care and family at home. Patient's daughter inquired about if they could get therapy set up at home. JANENE stated that if SNF was denied we would be able to set up Home Health for in home therapy upon DC. Patient's daughter verbalized understanding.     JANENE will continue to follow and assist as needed.

## 2024-11-13 NOTE — PLAN OF CARE
A232/A232 SANTOS Abrams is a 68 y.o.male admitted on 10/29/2024 for Atrial fibrillation with RVR   Code Status: Full Code MRN: [id number]   Review of patient's allergies indicates:  No Known Allergies  Past Medical History:   Diagnosis Date    Arthritis     CHF (congestive heart failure)     COPD (chronic obstructive pulmonary disease)     Hypertension     Liver disease     Stroke       PRN meds    acetaminophen, 650 mg, Q6H PRN  influenza (adjuvanted), 0.5 mL, Prior to discharge  ondansetron, 8 mg, Q6H PRN  pneumoc 20-haylee conj-dip cr(PF), 0.5 mL, vaccine x 1 dose      Chart check completed. Will continue plan of care.      Orientation: disoriented to, time  [place] Coma Scale Score: 15     Lead Monitored: Lead II Rhythm: normal sinus rhythm Frequency/Ectopy: PVCs  Cardiac/Telemetry Box Number: [id number]  VTE Core Measure: Pharmacological prophylaxis initiated/maintained Last Bowel Movement: 11/11/24  Diet Renal  Voiding Characteristics: voids spontaneously without difficulty  Chris Score: 17  Fall Risk Score: 19  Accucheck []   Freq?      Lines/Drains/Airways       Peripheral Intravenous Line  Duration                  Peripheral IV - Single Lumen 11/06/24 1521 20 G Left;Posterior Forearm 6 days

## 2024-11-13 NOTE — SUBJECTIVE & OBJECTIVE
Interval History: No acute events overnight, afebrile. Vitals concerning for bradycardia, but patient remains asymptomatic.      Objective:     Vital Signs (Most Recent):  Temp: 98.5 °F (36.9 °C) (11/12/24 2008)  Pulse: (!) 54 (11/12/24 2008)  Resp: 18 (11/12/24 2008)  BP: (!) 100/56 (11/12/24 2008)  SpO2: 100 % (11/12/24 2008) Vital Signs (24h Range):  Temp:  [97.4 °F (36.3 °C)-98.6 °F (37 °C)] 98.5 °F (36.9 °C)  Pulse:  [54-78] 54  Resp:  [18-20] 18  SpO2:  [96 %-100 %] 100 %  BP: (100-120)/(56-77) 100/56     Weight: 80.1 kg (176 lb 9.4 oz)  Body mass index is 26.08 kg/m².    Intake/Output Summary (Last 24 hours) at 11/12/2024 2025  Last data filed at 11/12/2024 1200  Gross per 24 hour   Intake 780 ml   Output 1070 ml   Net -290 ml         Physical Exam  Vitals and nursing note reviewed.   Constitutional:       General: He is not in acute distress.     Appearance: He is ill-appearing. He is not toxic-appearing or diaphoretic.   HENT:      Head: Normocephalic and atraumatic.      Mouth/Throat:      Mouth: Mucous membranes are moist.   Eyes:      General: No scleral icterus.        Right eye: No discharge.         Left eye: No discharge.   Cardiovascular:      Rate and Rhythm: Bradycardia present. Rhythm irregular.      Heart sounds: Normal heart sounds.   Pulmonary:      Effort: Pulmonary effort is normal. No respiratory distress.   Abdominal:      General: Bowel sounds are normal.      Tenderness: There is no abdominal tenderness.   Musculoskeletal:      Cervical back: No rigidity.      Right lower leg: No edema.      Left lower leg: No edema.   Skin:     General: Skin is warm and dry.      Coloration: Skin is not jaundiced.   Neurological:      Mental Status: He is alert and oriented to person, place, and time. Mental status is at baseline.   Psychiatric:         Mood and Affect: Mood normal.         Behavior: Behavior normal.             Significant Labs: All pertinent labs within the past 24 hours have been  reviewed.  LABS:  Recent Labs   Lab 11/10/24  0504 11/11/24  0529 11/12/24  0429    136 137   K 5.0 4.5 4.6   * 111* 107   CO2 19* 19* 20*   BUN 26* 24* 35*   CREATININE 1.4 1.4 1.5*   GLU 70 77 71   ANIONGAP 7* 6* 10     Recent Labs   Lab 11/10/24  0504 11/11/24  0529 11/12/24  0429   MG 1.8 1.9 2.0   PHOS 2.8 3.1 3.2     Recent Labs   Lab 11/10/24  0504 11/11/24  0529 11/12/24  0429   AST 24 23 22   ALT 17 17 18   ALKPHOS 50 52 53   BILITOT 0.3 0.4 0.3   ALBUMIN 3.3* 3.4* 3.5     POCT Glucose:   Recent Labs   Lab 11/06/24  1157 11/06/24  2158   POCTGLUCOSE 85 89    Recent Labs   Lab 11/10/24  0504 11/11/24  0529 11/12/24  0429   WBC 6.33 5.35 6.04   HGB 11.9* 11.9* 12.6*   HCT 38.3* 37.0* 40.8    216 246   GRAN 66.9  4.2 67.2  3.6 64.1  3.9          Micro  Blood Cultures  Lab Results   Component Value Date    LABBLOO No growth after 5 days. 10/30/2024    LABBLOO No growth after 5 days. 10/30/2024       Significant Imaging: I have reviewed all pertinent imaging results/findings within the past 24 hours.  US Lower Extrem Arteries Bilat with GEOVANNY (xpd)   Final Result      1.  Markedly abnormal study.  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  There is monophasic flow involving all vessels imaged with marked plaque formation diffusely.  There are low velocities involving the bilateral popliteal arteries and trifurcation vessels.      2.  There are elevated velocities involving both profunda femoral arteries, consistent with significant stenosis.      3.  The bilateral ABIs are unable to be measured due to noncompliance ankle vessels.         Electronically signed by: Bentley Dumont MD   Date:    11/02/2024   Time:    18:56      US Kidney   Final Result      No hydronephrosis         Electronically signed by: Umair Grant MD   Date:    10/30/2024   Time:    11:06      X-Ray Chest AP Portable   Final Result      Right-sided PICC is satisfactory          Electronically signed by: Robin Sanchez   Date:    10/29/2024   Time:    22:46          Inpatient Medications:    Scheduled Meds:   amiodarone  200 mg Oral Daily    arformoteroL  15 mcg Nebulization BID    aspirin  81 mg Oral Daily    atorvastatin  80 mg Oral QHS    budesonide  0.5 mg Nebulization Q12H    empagliflozin  10 mg Oral Daily    [START ON 11/13/2024] famotidine  20 mg Oral Daily    losartan  12.5 mg Oral Daily    metoprolol succinate  12.5 mg Oral Daily    rivaroxaban  15 mg Oral Daily with dinner    senna-docusate 8.6-50 mg  1 tablet Oral BID     PRN Meds:  Current Facility-Administered Medications:     acetaminophen, 650 mg, Oral, Q6H PRN    influenza (adjuvanted), 0.5 mL, Intramuscular, Prior to discharge    ondansetron, 8 mg, Intravenous, Q6H PRN    pneumoc 20-haylee conj-dip cr(PF), 0.5 mL, Intramuscular, vaccine x 1 dose

## 2024-11-13 NOTE — PLAN OF CARE
Pt willing to participate in therapy today  Bed mobility SBA  Seated scoot SBA  Sit to stand with RW min A  Ambulated with RW min A x20 feet  Transfer to bedside chair with RW min A  Completed seated exercises in chair    Recommend moderate intensity therapy at [facility]

## 2024-11-14 PROBLEM — E87.5 HYPERKALEMIA: Status: RESOLVED | Noted: 2024-10-29 | Resolved: 2024-11-14

## 2024-11-14 LAB
ALBUMIN SERPL BCP-MCNC: 3.4 G/DL (ref 3.5–5.2)
ALP SERPL-CCNC: 54 U/L (ref 40–150)
ALT SERPL W/O P-5'-P-CCNC: 19 U/L (ref 10–44)
ANION GAP SERPL CALC-SCNC: 9 MMOL/L (ref 8–16)
AST SERPL-CCNC: 20 U/L (ref 10–40)
BASOPHILS # BLD AUTO: 0.02 K/UL (ref 0–0.2)
BASOPHILS NFR BLD: 0.3 % (ref 0–1.9)
BILIRUB SERPL-MCNC: 0.3 MG/DL (ref 0.1–1)
BUN SERPL-MCNC: 41 MG/DL (ref 8–23)
CALCIUM SERPL-MCNC: 8.9 MG/DL (ref 8.7–10.5)
CHLORIDE SERPL-SCNC: 108 MMOL/L (ref 95–110)
CO2 SERPL-SCNC: 18 MMOL/L (ref 23–29)
CREAT SERPL-MCNC: 1.3 MG/DL (ref 0.5–1.4)
DIFFERENTIAL METHOD BLD: ABNORMAL
EOSINOPHIL # BLD AUTO: 0.2 K/UL (ref 0–0.5)
EOSINOPHIL NFR BLD: 2.6 % (ref 0–8)
ERYTHROCYTE [DISTWIDTH] IN BLOOD BY AUTOMATED COUNT: 14.1 % (ref 11.5–14.5)
EST. GFR  (NO RACE VARIABLE): >60 ML/MIN/1.73 M^2
GLUCOSE SERPL-MCNC: 73 MG/DL (ref 70–110)
HCT VFR BLD AUTO: 39.8 % (ref 40–54)
HGB BLD-MCNC: 12.6 G/DL (ref 14–18)
IMM GRANULOCYTES # BLD AUTO: 0.03 K/UL (ref 0–0.04)
IMM GRANULOCYTES NFR BLD AUTO: 0.4 % (ref 0–0.5)
LYMPHOCYTES # BLD AUTO: 1.3 K/UL (ref 1–4.8)
LYMPHOCYTES NFR BLD: 18.4 % (ref 18–48)
MAGNESIUM SERPL-MCNC: 1.9 MG/DL (ref 1.6–2.6)
MCH RBC QN AUTO: 27.7 PG (ref 27–31)
MCHC RBC AUTO-ENTMCNC: 31.7 G/DL (ref 32–36)
MCV RBC AUTO: 88 FL (ref 82–98)
MONOCYTES # BLD AUTO: 0.6 K/UL (ref 0.3–1)
MONOCYTES NFR BLD: 8.1 % (ref 4–15)
NEUTROPHILS # BLD AUTO: 5.1 K/UL (ref 1.8–7.7)
NEUTROPHILS NFR BLD: 70.2 % (ref 38–73)
NRBC BLD-RTO: 0 /100 WBC
PHOSPHATE SERPL-MCNC: 3.1 MG/DL (ref 2.7–4.5)
PLATELET # BLD AUTO: 252 K/UL (ref 150–450)
PMV BLD AUTO: 8.9 FL (ref 9.2–12.9)
POTASSIUM SERPL-SCNC: 4.3 MMOL/L (ref 3.5–5.1)
PROT SERPL-MCNC: 7 G/DL (ref 6–8.4)
RBC # BLD AUTO: 4.55 M/UL (ref 4.6–6.2)
SODIUM SERPL-SCNC: 135 MMOL/L (ref 136–145)
WBC # BLD AUTO: 7.3 K/UL (ref 3.9–12.7)

## 2024-11-14 PROCEDURE — 25000003 PHARM REV CODE 250: Performed by: INTERNAL MEDICINE

## 2024-11-14 PROCEDURE — 80053 COMPREHEN METABOLIC PANEL: CPT | Performed by: NURSE PRACTITIONER

## 2024-11-14 PROCEDURE — 25000003 PHARM REV CODE 250: Performed by: EMERGENCY MEDICINE

## 2024-11-14 PROCEDURE — 36415 COLL VENOUS BLD VENIPUNCTURE: CPT | Performed by: NURSE PRACTITIONER

## 2024-11-14 PROCEDURE — 84100 ASSAY OF PHOSPHORUS: CPT | Performed by: NURSE PRACTITIONER

## 2024-11-14 PROCEDURE — 97116 GAIT TRAINING THERAPY: CPT

## 2024-11-14 PROCEDURE — 25000242 PHARM REV CODE 250 ALT 637 W/ HCPCS: Performed by: INTERNAL MEDICINE

## 2024-11-14 PROCEDURE — 21400001 HC TELEMETRY ROOM

## 2024-11-14 PROCEDURE — 94761 N-INVAS EAR/PLS OXIMETRY MLT: CPT

## 2024-11-14 PROCEDURE — 25000003 PHARM REV CODE 250: Performed by: NURSE PRACTITIONER

## 2024-11-14 PROCEDURE — 97530 THERAPEUTIC ACTIVITIES: CPT

## 2024-11-14 PROCEDURE — 25000242 PHARM REV CODE 250 ALT 637 W/ HCPCS: Performed by: NURSE PRACTITIONER

## 2024-11-14 PROCEDURE — 94640 AIRWAY INHALATION TREATMENT: CPT

## 2024-11-14 PROCEDURE — 25000003 PHARM REV CODE 250: Performed by: STUDENT IN AN ORGANIZED HEALTH CARE EDUCATION/TRAINING PROGRAM

## 2024-11-14 PROCEDURE — 85025 COMPLETE CBC W/AUTO DIFF WBC: CPT | Performed by: NURSE PRACTITIONER

## 2024-11-14 PROCEDURE — 25000003 PHARM REV CODE 250: Performed by: SPECIALIST

## 2024-11-14 PROCEDURE — 63600175 PHARM REV CODE 636 W HCPCS: Performed by: EMERGENCY MEDICINE

## 2024-11-14 PROCEDURE — 83735 ASSAY OF MAGNESIUM: CPT | Performed by: NURSE PRACTITIONER

## 2024-11-14 RX ORDER — CYANOCOBALAMIN 1000 UG/ML
1000 INJECTION, SOLUTION INTRAMUSCULAR; SUBCUTANEOUS DAILY
Status: COMPLETED | OUTPATIENT
Start: 2024-11-14 | End: 2024-11-15

## 2024-11-14 RX ORDER — FAMOTIDINE 20 MG/1
20 TABLET, FILM COATED ORAL 2 TIMES DAILY
Status: DISCONTINUED | OUTPATIENT
Start: 2024-11-14 | End: 2024-11-15 | Stop reason: HOSPADM

## 2024-11-14 RX ORDER — THIAMINE HCL 100 MG
100 TABLET ORAL DAILY
Status: DISCONTINUED | OUTPATIENT
Start: 2024-11-14 | End: 2024-11-15 | Stop reason: HOSPADM

## 2024-11-14 RX ORDER — ACETAMINOPHEN 500 MG
5000 TABLET ORAL DAILY
Status: DISCONTINUED | OUTPATIENT
Start: 2024-11-14 | End: 2024-11-15 | Stop reason: HOSPADM

## 2024-11-14 RX ADMIN — EMPAGLIFLOZIN 10 MG: 10 TABLET, FILM COATED ORAL at 09:11

## 2024-11-14 RX ADMIN — FAMOTIDINE 20 MG: 20 TABLET ORAL at 09:11

## 2024-11-14 RX ADMIN — SENNOSIDES AND DOCUSATE SODIUM 1 TABLET: 50; 8.6 TABLET ORAL at 09:11

## 2024-11-14 RX ADMIN — METOPROLOL SUCCINATE 12.5 MG: 25 TABLET, EXTENDED RELEASE ORAL at 09:11

## 2024-11-14 RX ADMIN — CYANOCOBALAMIN 1000 MCG: 1000 INJECTION INTRAMUSCULAR; SUBCUTANEOUS at 09:11

## 2024-11-14 RX ADMIN — CHOLECALCIFEROL TAB 125 MCG (5000 UNIT) 5000 UNITS: 125 TAB at 09:11

## 2024-11-14 RX ADMIN — RIVAROXABAN 20 MG: 20 TABLET, FILM COATED ORAL at 05:11

## 2024-11-14 RX ADMIN — ASPIRIN 81 MG: 81 TABLET, COATED ORAL at 09:11

## 2024-11-14 RX ADMIN — LOSARTAN POTASSIUM 12.5 MG: 25 TABLET, FILM COATED ORAL at 09:11

## 2024-11-14 RX ADMIN — THERA TABS 1 TABLET: TAB at 09:11

## 2024-11-14 RX ADMIN — BUDESONIDE INHALATION 0.5 MG: 0.5 SUSPENSION RESPIRATORY (INHALATION) at 07:11

## 2024-11-14 RX ADMIN — Medication 100 MG: at 09:11

## 2024-11-14 RX ADMIN — Medication 1 TABLET: at 09:11

## 2024-11-14 RX ADMIN — ARFORMOTEROL TARTRATE 15 MCG: 15 SOLUTION RESPIRATORY (INHALATION) at 07:11

## 2024-11-14 RX ADMIN — ATORVASTATIN CALCIUM 80 MG: 40 TABLET, FILM COATED ORAL at 09:11

## 2024-11-14 RX ADMIN — ACETAMINOPHEN 650 MG: 325 TABLET ORAL at 06:11

## 2024-11-14 RX ADMIN — AMIODARONE HYDROCHLORIDE 200 MG: 200 TABLET ORAL at 09:11

## 2024-11-14 NOTE — PLAN OF CARE
Pt tolerated interventions well. Required SBA for bed mobility, ambulated 60ft MIN A using RW. Recommending moderate intensity therapy upon d/c.

## 2024-11-14 NOTE — ASSESSMENT & PLAN NOTE
Patient has paroxysmal (<7 days) atrial fibrillation. Patient is currently in sinus rhythm. [id number] Score: 1. The patients heart rate in the last 24 hours is as follows:  Pulse  Min: 50  Max: 78     Antiarrhythmics  amiodarone tablet 200 mg, Daily, Oral  metoprolol succinate (TOPROL-XL) 24 hr split tablet 12.5 mg, Daily, Oral    Anticoagulants  rivaroxaban tablet 20 mg, With dinner, Oral    Plan  - Replete lytes with a goal of K>4, Mg >2  - Patient is anticoagulated, see medications listed above.  - Patient's afib is currently controlled  - cardiology following    Rate controlled, remains in Afib

## 2024-11-14 NOTE — PT/OT/SLP PROGRESS
Physical Therapy Treatment    Patient Name:  Layton Abrams   MRN:  [id number]    Recommendations:     Discharge Recommendations: Moderate Intensity Therapy  Discharge Equipment Recommendations: to be determined by next level of care  Barriers to discharge: Decreased caregiver support    Assessment:     Layton Abrams is a 68 y.o. male admitted with a medical diagnosis of Atrial fibrillation with RVR.  He presents with the following impairments/functional limitations: impaired endurance, weakness, impaired functional mobility, gait instability, pain, decreased safety awareness, decreased lower extremity function, impaired balance, decreased ROM, decreased coordination.    Rehab Prognosis: Good; patient would benefit from acute skilled PT services to address these deficits and reach maximum level of function.    Recent Surgery: * No surgery found *      Plan:     During this hospitalization, patient to be seen 3 x/week to address the identified rehab impairments via gait training, therapeutic activities, therapeutic exercises and progress toward the following goals:    Plan of Care Expires:  11/15/24    Subjective     Chief Complaint: Pt agreed to participate with encouragement from therapist.   Patient/Family Comments/goals: none stated  Pain/Comfort:  Pain Rating 1: 0/10      Objective:     Communicated with nurse Mclain and epic chart review prior to session.  Patient found supine with peripheral IV, telemetry, bed alarm upon PT entry to room.     General Precautions: Standard, fall  Orthopedic Precautions: N/A  Braces: N/A  Respiratory Status: Room air     Functional Mobility:  Gait belt applied - Yes  Bed Mobility  Rolling Left: stand by assistance  Scooting: stand by assistance  Supine to Sit: stand by assistance  Sit to Supine: stand by assistance  Transfers  Sit to Stand: contact guard assistance with rolling walker  Bed to Chair: pt refused due to fatigue  Gait  Patient ambulated 60ft with rolling walker and  "minimum assistance. Patient demonstrates unsteady gait, B genu varum R>L, no heel strike with R LE. No c/o dizziness, mild SOB, educated about pursed lip breathing technique and cued for use with mobility. All lines remained intact throughout ambulation trial.  Balance  Sitting: contact guard assistance  Standing: minimum assistance    AM-PAC 6 CLICK MOBILITY  Turning over in bed (including adjusting bedclothes, sheets and blankets)?: 3  Sitting down on and standing up from a chair with arms (e.g., wheelchair, bedside commode, etc.): 3  Moving from lying on back to sitting on the side of the bed?: 3  Moving to and from a bed to a chair (including a wheelchair)?: 3  Need to walk in hospital room?: 3  Climbing 3-5 steps with a railing?: 1 (NT)  Basic Mobility Total Score: 16       Treatment & Education:  Reviewed role of PT in acute care and POC. Pt tolerated interventions well. Reviewed importance of OOB activities, activity pacing, and HEP (marching/hip flex, hip abd, heel slides/LAQ, quad sets, ankle pumps) in order to maintain/regain strength. Encouraged to sit up in chair for all meals. Reviewed proper use of RW for safety and to reduce risk of falling. Reviewed "call don't fall" policy and increased risk of falling due to weakness, instructed to utilize call bell for assistance with all transfers. Pt agreeable to all requests.      Patient left with bed in chair position with all lines intact, call button in reach, bed alarm on, and nurse notified..    GOALS:   Multidisciplinary Problems       Physical Therapy Goals          Problem: Physical Therapy    Goal Priority Disciplines Outcome Interventions   Physical Therapy Goal     PT, PT/OT Progressing    Description: Goals to be met by 11/15/24.  1. Pt will complete bed mobility MIN A.  2. Pt will complete sit to stand MIN A.  3. Pt will ambulate 50ft MIN A using RW.  4. Pt will increase AMPAC score by 2 points to progress functional mobility.                 "       Time Tracking:     PT Received On: 11/14/24  PT Start Time: 1035     PT Stop Time: 1100  PT Total Time (min): 25 min     Billable Minutes: Gait Training 15min and Therapeutic Activity 10min    Treatment Type: Treatment  PT/PTA: PT     Number of PTA visits since last PT visit: 0     11/14/2024

## 2024-11-14 NOTE — ASSESSMENT & PLAN NOTE
Patient has Systolic (HFrEF) heart failure that is Acute on chronic. On presentation their CHF was decompensated. Evidence of decompensated CHF on presentation includes: elevated JVD and shortness of breath. The etiology of their decompensation is likely atrial fibrillation . Most recent  echo results are listed below.      Latest ECHO  Results for orders placed during the hospital encounter of 10/29/24    Echo    Interpretation Summary    Left Ventricle: The left ventricle is normal in size. Ventricular mass is normal. Mildly increased wall thickness. There is concentric remodeling. There is normal systolic function with a visually estimated ejection fraction of 60 - 65%. Unable to assess diastolic function due to atrial fibrillation.    Right Ventricle: Severe right ventricular enlargement. Wall thickness is normal. Systolic function is moderately reduced.    Right Atrium: Right atrium is severely dilated.    Tricuspid Valve: There is severe regurgitation.    Pulmonic Valve: There is mild regurgitation.    Pulmonary Artery: There is moderate pulmonary hypertension. The estimated pulmonary artery systolic pressure is 59 mmHg.    IVC/SVC: Elevated venous pressure at 15 mmHg.    Current Heart Failure Medications  empagliflozin (Jardiance) tablet 10 mg, Daily, Oral  losartan split tablet 12.5 mg, Daily, Oral  metoprolol succinate (TOPROL-XL) 24 hr split tablet 12.5 mg, Daily, Oral    Plan  - Monitor strict I&Os and daily weights.    - Place on telemetry  - Low sodium diet  - Cardiology has been consulted  - The patient's volume status is improving but not at their baseline as indicated by elevated JVD    Appears euvolemic

## 2024-11-14 NOTE — ASSESSMENT & PLAN NOTE
Hyperkalemia is likely due to CARINA.The patients most recent potassium results are listed below.  Recent Labs     11/12/24  0429 11/13/24  0432 11/14/24  0508   K 4.6 4.9 4.3       Plan  - Monitor for arrhythmias with EKG and/or continuous telemetry.     resolved

## 2024-11-14 NOTE — SUBJECTIVE & OBJECTIVE
Interval History: comfortable in bed, on RA, VSS Afeb, AAox3, speech clear. Better participation in PT/OT. Also getting wound care for the heels. SW working on SNF placement, appealing the denial to the insurance.     Review of Systems   Constitutional:  Positive for activity change and fatigue.   HENT: Negative.     Eyes: Negative.    Respiratory: Negative.     Cardiovascular:  Positive for palpitations.   Gastrointestinal: Negative.    Endocrine: Negative.    Genitourinary: Negative.    Musculoskeletal:  Positive for gait problem.   Skin:  Positive for color change and wound.   Neurological:  Positive for weakness.   Psychiatric/Behavioral:  The patient is nervous/anxious.      Objective:     Vital Signs (Most Recent):  Temp: 98.1 °F (36.7 °C) (11/14/24 1732)  Pulse: (!) 51 (11/14/24 1732)  Resp: 18 (11/14/24 1732)  BP: 120/60 (11/14/24 1732)  SpO2: 97 % (11/14/24 1732) Vital Signs (24h Range):  Temp:  [97.3 °F (36.3 °C)-98.9 °F (37.2 °C)] 98.1 °F (36.7 °C)  Pulse:  [50-78] 51  Resp:  [16-19] 18  SpO2:  [93 %-100 %] 97 %  BP: ()/(52-76) 120/60     Weight: 83.1 kg (183 lb 3.2 oz)  Body mass index is 27.05 kg/m².    Intake/Output Summary (Last 24 hours) at 11/14/2024 1749  Last data filed at 11/14/2024 1243  Gross per 24 hour   Intake --   Output 1525 ml   Net -1525 ml         Physical Exam  Vitals and nursing note reviewed.   Constitutional:       General: He is not in acute distress.     Appearance: He is ill-appearing. He is not toxic-appearing or diaphoretic.      Comments: Frail man in NAD, AAOx4, speech clear   HENT:      Head: Normocephalic and atraumatic.      Right Ear: External ear normal.      Left Ear: External ear normal.      Nose: Nose normal. No congestion or rhinorrhea.      Mouth/Throat:      Mouth: Mucous membranes are moist.      Pharynx: Oropharynx is clear. No oropharyngeal exudate or posterior oropharyngeal erythema.   Eyes:      General: No scleral icterus.        Right eye: No  discharge.         Left eye: No discharge.      Extraocular Movements: Extraocular movements intact.      Conjunctiva/sclera: Conjunctivae normal.      Pupils: Pupils are equal, round, and reactive to light.   Cardiovascular:      Rate and Rhythm: Rhythm irregular.      Heart sounds: Normal heart sounds.   Pulmonary:      Effort: Pulmonary effort is normal. No respiratory distress.      Breath sounds: Normal breath sounds.   Abdominal:      General: Abdomen is flat. Bowel sounds are normal.      Palpations: Abdomen is soft.      Tenderness: There is no abdominal tenderness.   Musculoskeletal:         General: Normal range of motion.      Cervical back: No rigidity.      Right lower leg: No edema.      Left lower leg: No edema.   Skin:     General: Skin is warm and dry.      Capillary Refill: Capillary refill takes 2 to 3 seconds.      Coloration: Skin is not jaundiced.      Findings: Lesion present.   Neurological:      General: No focal deficit present.      Mental Status: He is alert and oriented to person, place, and time. Mental status is at baseline.      Motor: Weakness present.      Gait: Gait abnormal.   Psychiatric:         Mood and Affect: Mood normal.         Behavior: Behavior normal.             Significant Labs: All pertinent labs within the past 24 hours have been reviewed.  CBC:   Recent Labs   Lab 11/13/24 0432 11/14/24  0508   WBC 7.65 7.30   HGB 12.7* 12.6*   HCT 39.6* 39.8*    252     CMP:   Recent Labs   Lab 11/13/24 0432 11/14/24  0508    135*   K 4.9 4.3    108   CO2 19* 18*   GLU 76 73   BUN 34* 41*   CREATININE 1.5* 1.3   CALCIUM 8.7 8.9   PROT 7.0 7.0   ALBUMIN 3.4* 3.4*   BILITOT 0.3 0.3   ALKPHOS 56 54   AST 26 20   ALT 18 19   ANIONGAP 9 9     Magnesium:   Recent Labs   Lab 11/13/24 0432 11/14/24  0508   MG 1.9 1.9       Significant Imaging: I have reviewed all pertinent imaging results/findings within the past 24 hours.

## 2024-11-14 NOTE — PROGRESS NOTES
[facility] Medicine  Progress Note    Patient Name: Layton Abrams  MRN: [id number]  Patient Class: IP- Inpatient   Admission Date: 10/29/2024  Length of Stay: 15 days  Attending Physician: Josefina Jeter MD  Primary Care Provider: Nellie Cadena MD        Subjective:     Principal Problem:Atrial fibrillation with RVR        HPI:  The patient is 68-year-old male with a history of atrial fibrillation, coronary artery disease with stenting, hypertension, stroke with right-sided deficits, COPD, HCV, left peroneal DVT, Xarelto use, and lower extremity cellulitis presented to [facility] Emergency Department on October 29 with weakness that began on the morning of presentation.  By report he had been feeling well the day prior but did not feel well on October 29.  In the emergency department he had AFib with RVR and hypotension, but oxygenation was stable on room air.  Patient was drowsy but would wake and speak without difficulty.  He reported no chest pain or shortness of breath.  Labs were concerning for CARINA with  and creatinine 5.38.  Potassium was 7.  High sensitivity troponin was in the normal range at 40.  Chest x-ray had clear lungs but did show marked cardiomegaly.  ED attempted cardioversion with the AFib with RVR, but the patient did not cardiovert.  By report, EKGs did not show evidence of significant peak T-waves. He received calcium, dextrose/insulin, lactated Ringer's, IV metoprolol, IV bicarbonate, and Lokelma.  Levophed infusion was initiated.  Blood pressure improved with the Levophed, but tachycardia persisted.  Patient has a PICC line in place.  He also had a Wilcox catheter placed and had a small amount of urine with catheter placement.  Case discussed with the ED doctor and with Critical Care Medicine at [facility].  Patient transfer to [facility] and admitted to the ICU. He was placed on bicarb and amiodarone infusions.         Overview/Hospital Course:  The patient presented with weakness that began on the morning of presentation. By report he had been feeling well the day prior but did not feel well on October 29. In the emergency department he had AFib with RVR and hypotension, but oxygenation was stable on room air. Patient was drowsy but would wake and speak without difficulty. He reported no chest pain or shortness of breath. Labs were concerning for CARINA with  and creatinine 5.38. Potassium was 7. High sensitivity troponin was in the normal range at 40. Chest x-ray had clear lungs but did show marked cardiomegaly. ED attempted cardioversion with the AFib with RVR, but the patient did not cardiovert. By report, EKGs did not show evidence of significant peak T-waves. He received calcium, dextrose/insulin, lactated Ringer's, IV metoprolol, IV bicarbonate, and Lokelma. Levophed infusion was initiated. Blood pressure improved with the Levophed, but tachycardia persisted. Patient has a PICC line in place. He also had a Wilcox catheter placed and had a small amount of urine with catheter placement. Case discussed with the ED doctor and with Critical Care Medicine at [facility]. Patient transfer to [facility] and admitted to the ICU. He was placed on bicarb and amiodarone infusions. He was weaned off of levophed.  His renal function improved. Arterial US revealed PAD and occluded sfa. Vascular surgery was consulted, and evaluation noted plans for outpatient follow up with left leg angiogram for possible intervention and right leg intervention in the near future as well.  Plan to follow up in 2-3 weeks after medical stabilization.  Vitals concerning for bradycardia at rest, but patient continues to remain asymptomatic.  Metoprolol dosage decreased.  Pending SNF placement    11/13- resting comfortably in bed after he participated with PT/OT today. VSS afeb, appears weak and frail. SW arranging for sending him to SNF  but Insurance denies. Labs stable, H/H 12.7/40, Porfirio/cr 34/1.5- cont present care.     Interval History: resting comfortably in bed after he participated with PT/OT today. VSS afeb, appears weak and frail. SW arranging for sending him to SNF but Insurance denies. Labs stable, H/H 12.7/40, Porfirio/cr 34/1.5- cont present care.   Review of Systems   Constitutional:  Positive for activity change and fatigue.   Cardiovascular:  Positive for palpitations.   Neurological:  Positive for weakness.     Objective:     Vital Signs (Most Recent):  Temp: 97.3 °F (36.3 °C) (11/13/24 1949)  Pulse: (!) 57 (11/13/24 1949)  Resp: 18 (11/13/24 1949)  BP: (!) 93/52 (11/13/24 1949)  SpO2: (!) 93 % (11/13/24 1949) Vital Signs (24h Range):  Temp:  [97.3 °F (36.3 °C)-98.9 °F (37.2 °C)] 97.3 °F (36.3 °C)  Pulse:  [56-70] 57  Resp:  [16-20] 18  SpO2:  [93 %-99 %] 93 %  BP: ()/(52-76) 93/52     Weight: 82 kg (180 lb 12.4 oz)  Body mass index is 26.7 kg/m².    Intake/Output Summary (Last 24 hours) at 11/13/2024 2031  Last data filed at 11/13/2024 1230  Gross per 24 hour   Intake 480 ml   Output 700 ml   Net -220 ml         Physical Exam  Vitals and nursing note reviewed.   Constitutional:       General: He is not in acute distress.     Appearance: He is ill-appearing. He is not toxic-appearing or diaphoretic.   HENT:      Head: Normocephalic and atraumatic.      Mouth/Throat:      Mouth: Mucous membranes are moist.   Eyes:      General: No scleral icterus.        Right eye: No discharge.         Left eye: No discharge.   Cardiovascular:      Rate and Rhythm: Bradycardia present. Rhythm irregular.      Heart sounds: Normal heart sounds.   Pulmonary:      Effort: Pulmonary effort is normal. No respiratory distress.   Abdominal:      General: Bowel sounds are normal.      Tenderness: There is no abdominal tenderness.   Musculoskeletal:      Cervical back: No rigidity.      Right lower leg: No edema.      Left lower leg: No edema.   Skin:      "General: Skin is warm and dry.      Coloration: Skin is not jaundiced.   Neurological:      Mental Status: He is alert and oriented to person, place, and time. Mental status is at baseline.   Psychiatric:         Mood and Affect: Mood normal.         Behavior: Behavior normal.             Significant Labs: All pertinent labs within the past 24 hours have been reviewed.  BMP:   Recent Labs   Lab 11/13/24  0432   GLU 76      K 4.9      CO2 19*   BUN 34*   CREATININE 1.5*   CALCIUM 8.7   MG 1.9       Significant Imaging: I have reviewed all pertinent imaging results/findings within the past 24 hours.    Assessment/Plan:      * Atrial fibrillation with RVR  Patient has paroxysmal (<7 days) atrial fibrillation. Patient is currently in sinus rhythm. [id number] Score: 1. The patients heart rate in the last 24 hours is as follows:  Pulse  Min: 56  Max: 70     Antiarrhythmics  amiodarone tablet 200 mg, Daily, Oral  metoprolol succinate (TOPROL-XL) 24 hr split tablet 12.5 mg, Daily, Oral    Anticoagulants  rivaroxaban tablet 15 mg, With dinner, Oral    Plan  - Replete lytes with a goal of K>4, Mg >2  - Patient is anticoagulated, see medications listed above.  - Patient's afib is currently controlled  - cardiology following    Rate controlled    PAD (peripheral artery disease)  Lower extremity altered ultrasound noted "occlusion of the left mid and distal superficial femoral arteries "    -Vascular surgery was consulted, and evaluation noted plans for outpatient follow up with left leg angiogram for possible intervention and right leg intervention in the near future as well.  Plan to follow up in 2-3 weeks after medical stabilization.      -continue aspirin    HFrEF (heart failure with reduced ejection fraction)  Patient has Systolic (HFrEF) heart failure that is Acute on chronic. On presentation their CHF was decompensated. Evidence of decompensated CHF on presentation includes: elevated JVD and shortness of " breath. The etiology of their decompensation is likely atrial fibrillation . Most recent  echo results are listed below.      Latest ECHO  Results for orders placed during the hospital encounter of 10/29/24    Echo    Interpretation Summary    Left Ventricle: The left ventricle is normal in size. Ventricular mass is normal. Mildly increased wall thickness. There is concentric remodeling. There is normal systolic function with a visually estimated ejection fraction of 60 - 65%. Unable to assess diastolic function due to atrial fibrillation.    Right Ventricle: Severe right ventricular enlargement. Wall thickness is normal. Systolic function is moderately reduced.    Right Atrium: Right atrium is severely dilated.    Tricuspid Valve: There is severe regurgitation.    Pulmonic Valve: There is mild regurgitation.    Pulmonary Artery: There is moderate pulmonary hypertension. The estimated pulmonary artery systolic pressure is 59 mmHg.    IVC/SVC: Elevated venous pressure at 15 mmHg.    Current Heart Failure Medications  empagliflozin (Jardiance) tablet 10 mg, Daily, Oral  losartan split tablet 12.5 mg, Daily, Oral  metoprolol succinate (TOPROL-XL) 24 hr split tablet 12.5 mg, Daily, Oral    Plan  - Monitor strict I&Os and daily weights.    - Place on telemetry  - Low sodium diet  - Cardiology has been consulted  - The patient's volume status is improving but not at their baseline as indicated by elevated JVD    Appears euvolemic        Panlobular emphysema  Patient's COPD is controlled currently.  Patient is currently off COPD Pathway. Continue scheduled inhalers Supplemental oxygen and monitor respiratory status closely.     Hyperkalemia  Hyperkalemia is likely due to CARINA.The patients most recent potassium results are listed below.  Recent Labs     11/11/24  0529 11/12/24  0429 11/13/24  0432   K 4.5 4.6 4.9       Plan  - Monitor for arrhythmias with EKG and/or continuous telemetry.     resolved          CARINA (acute  kidney injury)  CARINA is likely due to  multiple factors, HF, afib, hypotension . Baseline creatinine is  0.7-1 . Most recent creatinine and eGFR are listed below.  Recent Labs     11/11/24  0529 11/12/24  0429 11/13/24  0432   CREATININE 1.4 1.5* 1.5*   EGFRNORACEVR 55* 51* 51*        Plan  - Avoid nephrotoxins and renally dose meds for GFR listed above  - Monitor urine output, serial BMP, and adjust therapy as needed  - appreciate nephrology    stable      VTE Risk Mitigation (From admission, onward)           Ordered     rivaroxaban tablet 15 mg  With dinner         11/12/24 1155                    Discharge Planning   WIL: 11/12/2024     Code Status: Full Code   Is the patient medically ready for discharge?:     Reason for patient still in hospital (select all that apply): Patient trending condition, Laboratory test, Treatment, Imaging, Consult recommendations, PT / OT recommendations, and Pending disposition  Discharge Plan A: Skilled Nursing Facility   Discharge Delays: None known at this time    Josefina Jeter MD  Department of Hospital Medicine   [facility] ([facility])

## 2024-11-14 NOTE — ASSESSMENT & PLAN NOTE
Hyperkalemia is likely due to CARINA.The patients most recent potassium results are listed below.  Recent Labs     11/11/24  0529 11/12/24  0429 11/13/24  0432   K 4.5 4.6 4.9       Plan  - Monitor for arrhythmias with EKG and/or continuous telemetry.     resolved

## 2024-11-14 NOTE — ASSESSMENT & PLAN NOTE
Patient has Systolic (HFrEF) heart failure that is Acute on chronic. On presentation their CHF was decompensated. Evidence of decompensated CHF on presentation includes: elevated JVD and shortness of breath. The etiology of their decompensation is likely atrial fibrillation . Most recent  echo results are listed below.      Latest ECHO  Results for orders placed during the hospital encounter of 10/29/24    Echo    Interpretation Summary    Left Ventricle: The left ventricle is normal in size. Ventricular mass is normal. Mildly increased wall thickness. There is concentric remodeling. There is normal systolic function with a visually estimated ejection fraction of 60 - 65%. Unable to assess diastolic function due to atrial fibrillation.    Right Ventricle: Severe right ventricular enlargement. Wall thickness is normal. Systolic function is moderately reduced.    Right Atrium: Right atrium is severely dilated.    Tricuspid Valve: There is severe regurgitation.    Pulmonic Valve: There is mild regurgitation.    Pulmonary Artery: There is moderate pulmonary hypertension. The estimated pulmonary artery systolic pressure is 59 mmHg.    IVC/SVC: Elevated venous pressure at 15 mmHg.    Current Heart Failure Medications  empagliflozin (Jardiance) tablet 10 mg, Daily, Oral  losartan split tablet 12.5 mg, Daily, Oral  metoprolol succinate (TOPROL-XL) 24 hr split tablet 12.5 mg, Daily, Oral    Plan  - Monitor strict I&Os and daily weights.    - Place on telemetry  - Low sodium diet  - Cardiology has been consulted  - The patient's volume status is improving but not at their baseline as indicated by elevated JVD    Appears euvolemic

## 2024-11-14 NOTE — ASSESSMENT & PLAN NOTE
CARINA is likely due to  multiple factors, HF, afib, hypotension . Baseline creatinine is  0.7-1 . Most recent creatinine and eGFR are listed below.  Recent Labs     11/11/24  0529 11/12/24  0429 11/13/24  0432   CREATININE 1.4 1.5* 1.5*   EGFRNORACEVR 55* 51* 51*        Plan  - Avoid nephrotoxins and renally dose meds for GFR listed above  - Monitor urine output, serial BMP, and adjust therapy as needed  - appreciate nephrology    stable

## 2024-11-14 NOTE — PLAN OF CARE
"   11/14/24 1210   Rounds   Attendance Provider;;Charge nurse;Physical therapist   Discharge Plan A Skilled Nursing Facility   Why the patient remains in the hospital Insurance issues   Transition of Care Barriers None       Per Maldonado, with [employer], she did submit the appeal to Patient's insurance yesterday for SNF.   Per Maldonado, Patient does not have "life threatening" reason to do fast appeal, so she had to submit a standard appeal. Maldonado stated standard appeal could take up to 30 days, but they usually hear sooner from them.   JANENE stated she would touch base again and requested Maldonado let her know once they got authorization.   Maldonado stated she would let SW know once she heard from the insurance.     13 42 JANENE received a call from Villa, with Patient's [employer]. Per Villa, he was working patient's appeal for SNF placement. Villa was inquiring about if patient was still in patient, room number, date of admission, and MD following Patient. JANENE provided information to Villa.  Villa stated he would reach back out to JANENE once they had come to a decision.   "

## 2024-11-14 NOTE — PROGRESS NOTES
[facility] Medicine  Progress Note    Patient Name: Layton Abrams  MRN: [id number]  Patient Class: IP- Inpatient   Admission Date: 10/29/2024  Length of Stay: 16 days  Attending Physician: Josefina Jeter MD  Primary Care Provider: Nellie Cadena MD        Subjective:     Principal Problem:Atrial fibrillation with RVR        HPI:  The patient is 68-year-old male with a history of atrial fibrillation, coronary artery disease with stenting, hypertension, stroke with right-sided deficits, COPD, HCV, left peroneal DVT, Xarelto use, and lower extremity cellulitis presented to [facility] Emergency Department on October 29 with weakness that began on the morning of presentation.  By report he had been feeling well the day prior but did not feel well on October 29.  In the emergency department he had AFib with RVR and hypotension, but oxygenation was stable on room air.  Patient was drowsy but would wake and speak without difficulty.  He reported no chest pain or shortness of breath.  Labs were concerning for CARINA with  and creatinine 5.38.  Potassium was 7.  High sensitivity troponin was in the normal range at 40.  Chest x-ray had clear lungs but did show marked cardiomegaly.  ED attempted cardioversion with the AFib with RVR, but the patient did not cardiovert.  By report, EKGs did not show evidence of significant peak T-waves. He received calcium, dextrose/insulin, lactated Ringer's, IV metoprolol, IV bicarbonate, and Lokelma.  Levophed infusion was initiated.  Blood pressure improved with the Levophed, but tachycardia persisted.  Patient has a PICC line in place.  He also had a Wilcox catheter placed and had a small amount of urine with catheter placement.  Case discussed with the ED doctor and with Critical Care Medicine at [facility].  Patient transfer to [facility] and admitted to the ICU. He was placed on bicarb and amiodarone infusions.         Overview/Hospital Course:  The patient presented with weakness that began on the morning of presentation. By report he had been feeling well the day prior but did not feel well on October 29. In the emergency department he had AFib with RVR and hypotension, but oxygenation was stable on room air. Patient was drowsy but would wake and speak without difficulty. He reported no chest pain or shortness of breath. Labs were concerning for CARINA with  and creatinine 5.38. Potassium was 7. High sensitivity troponin was in the normal range at 40. Chest x-ray had clear lungs but did show marked cardiomegaly. ED attempted cardioversion with the AFib with RVR, but the patient did not cardiovert. By report, EKGs did not show evidence of significant peak T-waves. He received calcium, dextrose/insulin, lactated Ringer's, IV metoprolol, IV bicarbonate, and Lokelma. Levophed infusion was initiated. Blood pressure improved with the Levophed, but tachycardia persisted. Patient has a PICC line in place. He also had a Wilcox catheter placed and had a small amount of urine with catheter placement. Case discussed with the ED doctor and with Critical Care Medicine at [facility]. Patient transfer to [facility] and admitted to the ICU. He was placed on bicarb and amiodarone infusions. He was weaned off of levophed.  His renal function improved. Arterial US revealed PAD and occluded sfa. Vascular surgery was consulted, and evaluation noted plans for outpatient follow up with left leg angiogram for possible intervention and right leg intervention in the near future as well.  Plan to follow up in 2-3 weeks after medical stabilization.  Vitals concerning for bradycardia at rest, but patient continues to remain asymptomatic.  Metoprolol dosage decreased.  Pending SNF placement    11/13- resting comfortably in bed after he participated with PT/OT today. VSS afeb, appears weak and frail. SW arranging for sending him to SNF  but Insurance denies. Labs stable, H/H 12.7/40, Porfirio/cr 34/1.5- cont present care.     11/14- comfortable in bed, on RA, VSS Afeb, AAox3, speech clear. Better participation in PT/OT. Also getting wound care for the heels. SW working on SNF placement, appealing the denial to the insurance.     Interval History: comfortable in bed, on RA, VSS Afeb, AAox3, speech clear. Better participation in PT/OT. Also getting wound care for the heels. SW working on SNF placement, appealing the denial to the insurance.     Review of Systems   Constitutional:  Positive for activity change and fatigue.   HENT: Negative.     Eyes: Negative.    Respiratory: Negative.     Cardiovascular:  Positive for palpitations.   Gastrointestinal: Negative.    Endocrine: Negative.    Genitourinary: Negative.    Musculoskeletal:  Positive for gait problem.   Skin:  Positive for color change and wound.   Neurological:  Positive for weakness.   Psychiatric/Behavioral:  The patient is nervous/anxious.      Objective:     Vital Signs (Most Recent):  Temp: 98.1 °F (36.7 °C) (11/14/24 1732)  Pulse: (!) 51 (11/14/24 1732)  Resp: 18 (11/14/24 1732)  BP: 120/60 (11/14/24 1732)  SpO2: 97 % (11/14/24 1732) Vital Signs (24h Range):  Temp:  [97.3 °F (36.3 °C)-98.9 °F (37.2 °C)] 98.1 °F (36.7 °C)  Pulse:  [50-78] 51  Resp:  [16-19] 18  SpO2:  [93 %-100 %] 97 %  BP: ()/(52-76) 120/60     Weight: 83.1 kg (183 lb 3.2 oz)  Body mass index is 27.05 kg/m².    Intake/Output Summary (Last 24 hours) at 11/14/2024 1749  Last data filed at 11/14/2024 1243  Gross per 24 hour   Intake --   Output 1525 ml   Net -1525 ml         Physical Exam  Vitals and nursing note reviewed.   Constitutional:       General: He is not in acute distress.     Appearance: He is ill-appearing. He is not toxic-appearing or diaphoretic.      Comments: Frail man in NAD, AAOx4, speech clear   HENT:      Head: Normocephalic and atraumatic.      Right Ear: External ear normal.      Left Ear: External  ear normal.      Nose: Nose normal. No congestion or rhinorrhea.      Mouth/Throat:      Mouth: Mucous membranes are moist.      Pharynx: Oropharynx is clear. No oropharyngeal exudate or posterior oropharyngeal erythema.   Eyes:      General: No scleral icterus.        Right eye: No discharge.         Left eye: No discharge.      Extraocular Movements: Extraocular movements intact.      Conjunctiva/sclera: Conjunctivae normal.      Pupils: Pupils are equal, round, and reactive to light.   Cardiovascular:      Rate and Rhythm: Rhythm irregular.      Heart sounds: Normal heart sounds.   Pulmonary:      Effort: Pulmonary effort is normal. No respiratory distress.      Breath sounds: Normal breath sounds.   Abdominal:      General: Abdomen is flat. Bowel sounds are normal.      Palpations: Abdomen is soft.      Tenderness: There is no abdominal tenderness.   Musculoskeletal:         General: Normal range of motion.      Cervical back: No rigidity.      Right lower leg: No edema.      Left lower leg: No edema.   Skin:     General: Skin is warm and dry.      Capillary Refill: Capillary refill takes 2 to 3 seconds.      Coloration: Skin is not jaundiced.      Findings: Lesion present.   Neurological:      General: No focal deficit present.      Mental Status: He is alert and oriented to person, place, and time. Mental status is at baseline.      Motor: Weakness present.      Gait: Gait abnormal.   Psychiatric:         Mood and Affect: Mood normal.         Behavior: Behavior normal.             Significant Labs: All pertinent labs within the past 24 hours have been reviewed.  CBC:   Recent Labs   Lab 11/13/24  0432 11/14/24  0508   WBC 7.65 7.30   HGB 12.7* 12.6*   HCT 39.6* 39.8*    252     CMP:   Recent Labs   Lab 11/13/24  0432 11/14/24  0508    135*   K 4.9 4.3    108   CO2 19* 18*   GLU 76 73   BUN 34* 41*   CREATININE 1.5* 1.3   CALCIUM 8.7 8.9   PROT 7.0 7.0   ALBUMIN 3.4* 3.4*   BILITOT 0.3 0.3  "  ALKPHOS 56 54   AST 26 20   ALT 18 19   ANIONGAP 9 9     Magnesium:   Recent Labs   Lab 11/13/24  0432 11/14/24  0508   MG 1.9 1.9       Significant Imaging: I have reviewed all pertinent imaging results/findings within the past 24 hours.    Assessment/Plan:      * Atrial fibrillation with RVR  Patient has paroxysmal (<7 days) atrial fibrillation. Patient is currently in sinus rhythm. [id number] Score: 1. The patients heart rate in the last 24 hours is as follows:  Pulse  Min: 50  Max: 78     Antiarrhythmics  amiodarone tablet 200 mg, Daily, Oral  metoprolol succinate (TOPROL-XL) 24 hr split tablet 12.5 mg, Daily, Oral    Anticoagulants  rivaroxaban tablet 20 mg, With dinner, Oral    Plan  - Replete lytes with a goal of K>4, Mg >2  - Patient is anticoagulated, see medications listed above.  - Patient's afib is currently controlled  - cardiology following    Rate controlled, remains in Afib    PAD (peripheral artery disease)  Lower extremity altered ultrasound noted "occlusion of the left mid and distal superficial femoral arteries "    -Vascular surgery was consulted, and evaluation noted plans for outpatient follow up with left leg angiogram for possible intervention and right leg intervention in the near future as well.  Plan to follow up in 2-3 weeks after medical stabilization.      -continue aspirin    HFrEF (heart failure with reduced ejection fraction)  Patient has Systolic (HFrEF) heart failure that is Acute on chronic. On presentation their CHF was decompensated. Evidence of decompensated CHF on presentation includes: elevated JVD and shortness of breath. The etiology of their decompensation is likely atrial fibrillation . Most recent  echo results are listed below.      Latest ECHO  Results for orders placed during the hospital encounter of 10/29/24    Echo    Interpretation Summary    Left Ventricle: The left ventricle is normal in size. Ventricular mass is normal. Mildly increased wall thickness. There " is concentric remodeling. There is normal systolic function with a visually estimated ejection fraction of 60 - 65%. Unable to assess diastolic function due to atrial fibrillation.    Right Ventricle: Severe right ventricular enlargement. Wall thickness is normal. Systolic function is moderately reduced.    Right Atrium: Right atrium is severely dilated.    Tricuspid Valve: There is severe regurgitation.    Pulmonic Valve: There is mild regurgitation.    Pulmonary Artery: There is moderate pulmonary hypertension. The estimated pulmonary artery systolic pressure is 59 mmHg.    IVC/SVC: Elevated venous pressure at 15 mmHg.    Current Heart Failure Medications  empagliflozin (Jardiance) tablet 10 mg, Daily, Oral  losartan split tablet 12.5 mg, Daily, Oral  metoprolol succinate (TOPROL-XL) 24 hr split tablet 12.5 mg, Daily, Oral    Plan  - Monitor strict I&Os and daily weights.    - Place on telemetry  - Low sodium diet  - Cardiology has been consulted  - The patient's volume status is improving but not at their baseline as indicated by elevated JVD    Appears euvolemic        Panlobular emphysema  Patient's COPD is controlled currently.  Patient is currently off COPD Pathway. Continue scheduled inhalers Supplemental oxygen and monitor respiratory status closely.     Must quit smoking- pt counseled again    CARINA (acute kidney injury)  CARINA is likely due to  multiple factors, HF, afib, hypotension . Baseline creatinine is  0.7-1 . Most recent creatinine and eGFR are listed below.  Recent Labs     11/12/24  0429 11/13/24  0432 11/14/24  0508   CREATININE 1.5* 1.5* 1.3   EGFRNORACEVR 51* 51* >60        Plan  - Avoid nephrotoxins and renally dose meds for GFR listed above  - Monitor urine output, serial BMP, and adjust therapy as needed  - appreciate nephrology    Stable    Pt encouraged to drink more fluids      VTE Risk Mitigation (From admission, onward)           Ordered     rivaroxaban tablet 20 mg  With dinner          11/14/24 1059                    Discharge Planning   WIL: 11/12/2024     Code Status: Full Code   Is the patient medically ready for discharge?:     Reason for patient still in hospital (select all that apply): Patient trending condition, Laboratory test, Treatment, Imaging, Consult recommendations, PT / OT recommendations, and Pending disposition  Discharge Plan A: Skilled Nursing Facility   Discharge Delays: None known at this time      Josefina Jeter MD  Department of Hospital Medicine   [facility])

## 2024-11-14 NOTE — ASSESSMENT & PLAN NOTE
CARINA is likely due to  multiple factors, HF, afib, hypotension . Baseline creatinine is  0.7-1 . Most recent creatinine and eGFR are listed below.  Recent Labs     11/12/24  0429 11/13/24  0432 11/14/24  0508   CREATININE 1.5* 1.5* 1.3   EGFRNORACEVR 51* 51* >60        Plan  - Avoid nephrotoxins and renally dose meds for GFR listed above  - Monitor urine output, serial BMP, and adjust therapy as needed  - appreciate nephrology    Stable    Pt encouraged to drink more fluids

## 2024-11-14 NOTE — PROGRESS NOTES
[facility])  Wound Care    Patient Name:  Layton Abrams   MRN:  [id number]  Date: 11/14/2024  Diagnosis: Atrial fibrillation with RVR    History:     Past Medical History:   Diagnosis Date    Arthritis     CHF (congestive heart failure)     COPD (chronic obstructive pulmonary disease)     Hypertension     Liver disease     Stroke        Social History     Socioeconomic History    Marital status: Unknown   Tobacco Use    Smoking status: Former     Current packs/day: 0.50     Types: Cigarettes    Smokeless tobacco: Never    Tobacco comments:     Quit 2010   Substance and Sexual Activity    Alcohol use: Not Currently    Drug use: Not Currently     Social Drivers of Health     Financial Resource Strain: Low Risk  (10/29/2024)    Overall Financial Resource Strain (CARDIA)     Difficulty of Paying Living Expenses: Not very hard   Food Insecurity: No Food Insecurity (10/29/2024)    Hunger Vital Sign     Worried About Running Out of Food in the Last Year: Never true     Ran Out of Food in the Last Year: Never true   Transportation Needs: No Transportation Needs (10/29/2024)    TRANSPORTATION NEEDS     Transportation : No   Stress: No Stress Concern Present (10/29/2024)    [employer] [place] [employer] - Occupational Stress Questionnaire     Feeling of Stress : Only a little   Housing Stability: Low Risk  (10/29/2024)    Housing Stability Vital Sign     Unable to Pay for Housing in the Last Year: No     Homeless in the Last Year: No       Precautions:     Allergies as of 10/29/2024    (No Known Allergies)       WO Assessment Details/Treatment     F/U with Mr. Abrams for ongoing wound care management for present on admission altered skin integrity. Patient still on the telemetry floor. Patient awake alert and agreeable to treatment.   Heel foams removed from bilateral heels, Right heel skin intact. Left heel medial aspect no longer noted to have DTI, area is red, blanchable intact. Lateral left  heel ulceration is again noted to be callused over, callus was easily removed with gentle cleansing. Revealing intact skin, ulceration has resolved. Cleansed bilateral heels with vashe, patted dry and painted with cavilon. Replaced heel foams to bilateral heels. Heel offloading boots placed back on the patient and educated patient on the importance of wearing them while in bed.   Foam dressing removed from left lateral lower leg, ulceration appears to have resolved. Dry intact pink scar tissue noted.  Cleansed with vashe, patted dry, painted with cavilon. No longer requires foam dressing will DC wound care orders.   Right groin partial thickness tissue loss has resolved skin is intact, area still very moist recommend continuing with wound care ordered for moisture management to right and left groin.   Foam dressing removed from lumbar spine, appears to have resolved with opening closed up. Redness and maroon discoloration around the area has improved significantly, still no warmth noted. Cleansed with saline, patted dry, applied cavilon skin barrier film.  No longer requires foam dressing will DC orders.   Continue Pressure injury preventions to include: turn Q2 hours, heel offloading, and moisture management.  All wounds previously being treated have resolved will sign off, reconsult as needed.        11/14/24 1409   WOCN Assessment   WOCN Total Time (mins) 45   Visit Date 11/14/24   Visit Time 1409   Consult Type Follow Up   WOCN Speciality Wound   Wound deep tissue injury;moisture;At risk for pressure Injury   Intervention assessed;applied;changed;chart review;coordination of care        Wound 10/29/24 2204 Ulceration Left lateral Heel   Date First Assessed/Time First Assessed: 10/29/24 2204   Present on Original Admission: Yes  Primary Wound Type: Ulceration  Side: Left  Orientation: lateral  Location: Heel  Is this injury device related?: No   Wound Image     Drainage Amount None   Drainage Characteristics/Odor  No odor   Appearance Intact;Dry   Tissue loss description Not applicable   Periwound Area Intact;Dry   Wound Edges Callused   Care Cleansed with:;Wound cleanser;Applied:;Skin Barrier   Dressing Applied;Foam   Periwound Care Skin barrier film applied        Wound 10/30/24 1000 Abscess Lumbar spine   Date First Assessed/Time First Assessed: 10/30/24 1000   Present on Original Admission: Yes  Primary Wound Type: Abscess  Location: Lumbar spine   Wound Image    Drainage Amount None   Drainage Characteristics/Odor No odor   Appearance Intact;Maroon;Purple   Periwound Area Intact;Maroon;Purple   Care Cleansed with:;Sterile normal saline;Applied:;Skin Barrier   Periwound Care Skin barrier film applied        Wound 10/30/24 1000 Ulceration Left lateral;lower Leg   Date First Assessed/Time First Assessed: 10/30/24 1000   Present on Original Admission: Yes  Primary Wound Type: Ulceration  Side: Left  Orientation: lateral;lower  Location: Leg   Wound Image    Dressing Appearance Clean;Dry;Intact   Drainage Amount None   Drainage Characteristics/Odor No odor   Appearance Intact;[place]   Periwound Area Dry;Intact   Care Cleansed with:;Wound cleanser;Applied:;Skin Barrier   Periwound Care Skin barrier film applied       11/14/2024

## 2024-11-14 NOTE — PLAN OF CARE
Discussed poc with pt, pt verbalized understanding    Purposeful rounding every 2hours    VS wnl  Cardiac monitoring in use, pt is NSR, tele monitor # [id number]   Blood glucose monitoring   Fall precautions in place, remains injury free  Pt denies c/o Pain  Bed locked at lowest position  Call light within reach    Chart check complete  Will cont with POC

## 2024-11-14 NOTE — SUBJECTIVE & OBJECTIVE
Interval History: resting comfortably in bed after he participated with PT/OT today. VSS afeb, appears weak and frail. SW arranging for sending him to SNF but Insurance denies. Labs stable, H/H 12.7/40, Porfirio/cr 34/1.5- cont present care.   Review of Systems   Constitutional:  Positive for activity change and fatigue.   Cardiovascular:  Positive for palpitations.   Neurological:  Positive for weakness.     Objective:     Vital Signs (Most Recent):  Temp: 97.3 °F (36.3 °C) (11/13/24 1949)  Pulse: (!) 57 (11/13/24 1949)  Resp: 18 (11/13/24 1949)  BP: (!) 93/52 (11/13/24 1949)  SpO2: (!) 93 % (11/13/24 1949) Vital Signs (24h Range):  Temp:  [97.3 °F (36.3 °C)-98.9 °F (37.2 °C)] 97.3 °F (36.3 °C)  Pulse:  [56-70] 57  Resp:  [16-20] 18  SpO2:  [93 %-99 %] 93 %  BP: ()/(52-76) 93/52     Weight: 82 kg (180 lb 12.4 oz)  Body mass index is 26.7 kg/m².    Intake/Output Summary (Last 24 hours) at 11/13/2024 2031  Last data filed at 11/13/2024 1230  Gross per 24 hour   Intake 480 ml   Output 700 ml   Net -220 ml         Physical Exam  Vitals and nursing note reviewed.   Constitutional:       General: He is not in acute distress.     Appearance: He is ill-appearing. He is not toxic-appearing or diaphoretic.   HENT:      Head: Normocephalic and atraumatic.      Mouth/Throat:      Mouth: Mucous membranes are moist.   Eyes:      General: No scleral icterus.        Right eye: No discharge.         Left eye: No discharge.   Cardiovascular:      Rate and Rhythm: Bradycardia present. Rhythm irregular.      Heart sounds: Normal heart sounds.   Pulmonary:      Effort: Pulmonary effort is normal. No respiratory distress.   Abdominal:      General: Bowel sounds are normal.      Tenderness: There is no abdominal tenderness.   Musculoskeletal:      Cervical back: No rigidity.      Right lower leg: No edema.      Left lower leg: No edema.   Skin:     General: Skin is warm and dry.      Coloration: Skin is not jaundiced.   Neurological:       Mental Status: He is alert and oriented to person, place, and time. Mental status is at baseline.   Psychiatric:         Mood and Affect: Mood normal.         Behavior: Behavior normal.             Significant Labs: All pertinent labs within the past 24 hours have been reviewed.  BMP:   Recent Labs   Lab 11/13/24  0432   GLU 76      K 4.9      CO2 19*   BUN 34*   CREATININE 1.5*   CALCIUM 8.7   MG 1.9       Significant Imaging: I have reviewed all pertinent imaging results/findings within the past 24 hours.

## 2024-11-14 NOTE — ASSESSMENT & PLAN NOTE
Patient's COPD is controlled currently.  Patient is currently off COPD Pathway. Continue scheduled inhalers Supplemental oxygen and monitor respiratory status closely.     Must quit smoking- pt counseled again

## 2024-11-14 NOTE — ASSESSMENT & PLAN NOTE
Patient has paroxysmal (<7 days) atrial fibrillation. Patient is currently in sinus rhythm. [id number] Score: 1. The patients heart rate in the last 24 hours is as follows:  Pulse  Min: 56  Max: 70     Antiarrhythmics  amiodarone tablet 200 mg, Daily, Oral  metoprolol succinate (TOPROL-XL) 24 hr split tablet 12.5 mg, Daily, Oral    Anticoagulants  rivaroxaban tablet 15 mg, With dinner, Oral    Plan  - Replete lytes with a goal of K>4, Mg >2  - Patient is anticoagulated, see medications listed above.  - Patient's afib is currently controlled  - cardiology following    Rate controlled

## 2024-11-15 VITALS
TEMPERATURE: 98 F | RESPIRATION RATE: 16 BRPM | SYSTOLIC BLOOD PRESSURE: 102 MMHG | DIASTOLIC BLOOD PRESSURE: 66 MMHG | OXYGEN SATURATION: 98 % | WEIGHT: 182.56 LBS | BODY MASS INDEX: 27.04 KG/M2 | HEART RATE: 71 BPM | HEIGHT: 69 IN

## 2024-11-15 PROBLEM — N17.9 AKI (ACUTE KIDNEY INJURY): Status: RESOLVED | Noted: 2024-10-29 | Resolved: 2024-11-15

## 2024-11-15 PROBLEM — I48.91 ATRIAL FIBRILLATION WITH RVR: Status: RESOLVED | Noted: 2024-10-29 | Resolved: 2024-11-15

## 2024-11-15 LAB — SARS-COV-2 RDRP RESP QL NAA+PROBE: NEGATIVE

## 2024-11-15 PROCEDURE — 25000242 PHARM REV CODE 250 ALT 637 W/ HCPCS: Performed by: INTERNAL MEDICINE

## 2024-11-15 PROCEDURE — 25000003 PHARM REV CODE 250: Performed by: INTERNAL MEDICINE

## 2024-11-15 PROCEDURE — 25000003 PHARM REV CODE 250: Performed by: NURSE PRACTITIONER

## 2024-11-15 PROCEDURE — 94640 AIRWAY INHALATION TREATMENT: CPT

## 2024-11-15 PROCEDURE — 87635 SARS-COV-2 COVID-19 AMP PRB: CPT | Performed by: EMERGENCY MEDICINE

## 2024-11-15 PROCEDURE — 63600175 PHARM REV CODE 636 W HCPCS: Performed by: EMERGENCY MEDICINE

## 2024-11-15 PROCEDURE — 25000003 PHARM REV CODE 250: Performed by: SPECIALIST

## 2024-11-15 PROCEDURE — 86580 TB INTRADERMAL TEST: CPT | Performed by: EMERGENCY MEDICINE

## 2024-11-15 PROCEDURE — 94761 N-INVAS EAR/PLS OXIMETRY MLT: CPT

## 2024-11-15 PROCEDURE — 25000242 PHARM REV CODE 250 ALT 637 W/ HCPCS: Performed by: NURSE PRACTITIONER

## 2024-11-15 PROCEDURE — 97530 THERAPEUTIC ACTIVITIES: CPT

## 2024-11-15 PROCEDURE — 97168 OT RE-EVAL EST PLAN CARE: CPT

## 2024-11-15 PROCEDURE — 97530 THERAPEUTIC ACTIVITIES: CPT | Mod: CQ

## 2024-11-15 PROCEDURE — 25000003 PHARM REV CODE 250: Performed by: STUDENT IN AN ORGANIZED HEALTH CARE EDUCATION/TRAINING PROGRAM

## 2024-11-15 PROCEDURE — 25000003 PHARM REV CODE 250: Performed by: EMERGENCY MEDICINE

## 2024-11-15 PROCEDURE — 97116 GAIT TRAINING THERAPY: CPT | Mod: CQ

## 2024-11-15 PROCEDURE — 30200315 PPD INTRADERMAL TEST REV CODE 302: Performed by: EMERGENCY MEDICINE

## 2024-11-15 RX ORDER — ATORVASTATIN CALCIUM 80 MG/1
80 TABLET, FILM COATED ORAL NIGHTLY
Qty: 90 TABLET | Refills: 3 | Status: SHIPPED | OUTPATIENT
Start: 2024-11-15 | End: 2025-11-15

## 2024-11-15 RX ORDER — LANOLIN ALCOHOL/MO/W.PET/CERES
100 CREAM (GRAM) TOPICAL DAILY
Start: 2024-11-16

## 2024-11-15 RX ORDER — ACETAMINOPHEN 500 MG
5000 TABLET ORAL DAILY
Start: 2024-11-16

## 2024-11-15 RX ORDER — AMIODARONE HYDROCHLORIDE 200 MG/1
200 TABLET ORAL DAILY
Qty: 30 TABLET | Refills: 11 | Status: SHIPPED | OUTPATIENT
Start: 2024-11-16 | End: 2025-11-16

## 2024-11-15 RX ORDER — FAMOTIDINE 20 MG/1
20 TABLET, FILM COATED ORAL 2 TIMES DAILY
Qty: 60 TABLET | Refills: 11 | Status: SHIPPED | OUTPATIENT
Start: 2024-11-15 | End: 2025-11-15

## 2024-11-15 RX ORDER — ASPIRIN 81 MG/1
81 TABLET ORAL DAILY
Start: 2024-11-16 | End: 2025-11-16

## 2024-11-15 RX ORDER — METOPROLOL SUCCINATE 25 MG/1
12.5 TABLET, EXTENDED RELEASE ORAL DAILY
Qty: 45 TABLET | Refills: 3 | Status: SHIPPED | OUTPATIENT
Start: 2024-11-16 | End: 2025-11-16

## 2024-11-15 RX ORDER — AMOXICILLIN 250 MG
1 CAPSULE ORAL 2 TIMES DAILY
Start: 2024-11-15

## 2024-11-15 RX ORDER — LOSARTAN POTASSIUM 25 MG/1
12.5 TABLET ORAL DAILY
Qty: 45 TABLET | Refills: 3 | Status: SHIPPED | OUTPATIENT
Start: 2024-11-16 | End: 2025-11-16

## 2024-11-15 RX ADMIN — AMIODARONE HYDROCHLORIDE 200 MG: 200 TABLET ORAL at 09:11

## 2024-11-15 RX ADMIN — ASPIRIN 81 MG: 81 TABLET, COATED ORAL at 09:11

## 2024-11-15 RX ADMIN — METOPROLOL SUCCINATE 12.5 MG: 25 TABLET, EXTENDED RELEASE ORAL at 09:11

## 2024-11-15 RX ADMIN — EMPAGLIFLOZIN 10 MG: 10 TABLET, FILM COATED ORAL at 09:11

## 2024-11-15 RX ADMIN — FAMOTIDINE 20 MG: 20 TABLET ORAL at 09:11

## 2024-11-15 RX ADMIN — Medication 1 TABLET: at 09:11

## 2024-11-15 RX ADMIN — TUBERCULIN PURIFIED PROTEIN DERIVATIVE 5 UNITS: 5 INJECTION, SOLUTION INTRADERMAL at 10:11

## 2024-11-15 RX ADMIN — THERA TABS 1 TABLET: TAB at 09:11

## 2024-11-15 RX ADMIN — LOSARTAN POTASSIUM 12.5 MG: 25 TABLET, FILM COATED ORAL at 09:11

## 2024-11-15 RX ADMIN — ACETAMINOPHEN 650 MG: 325 TABLET ORAL at 06:11

## 2024-11-15 RX ADMIN — BUDESONIDE INHALATION 0.5 MG: 0.5 SUSPENSION RESPIRATORY (INHALATION) at 08:11

## 2024-11-15 RX ADMIN — CYANOCOBALAMIN 1000 MCG: 1000 INJECTION INTRAMUSCULAR; SUBCUTANEOUS at 09:11

## 2024-11-15 RX ADMIN — CHOLECALCIFEROL TAB 125 MCG (5000 UNIT) 5000 UNITS: 125 TAB at 09:11

## 2024-11-15 RX ADMIN — Medication 100 MG: at 09:11

## 2024-11-15 RX ADMIN — ARFORMOTEROL TARTRATE 15 MCG: 15 SOLUTION RESPIRATORY (INHALATION) at 08:11

## 2024-11-15 NOTE — PT/OT/SLP PROGRESS
"Occupational Therapy   Treatment    Name: Layton Abrams  MRN: [id number]  Admitting Diagnosis:  Atrial fibrillation with RVR       Recommendations:     Discharge Recommendations: Moderate Intensity Therapy  Discharge Equipment Recommendations:  to be determined by next level of care  Barriers to discharge:  Decreased caregiver support    Assessment:     Layton Abrams is a 68 y.o. male with a medical diagnosis of Atrial fibrillation with RVR.  He presents with the following performance deficits affecting function are weakness, impaired endurance, impaired self care skills, impaired functional mobility, gait instability, impaired balance, decreased coordination, decreased upper extremity function, decreased lower extremity function, decreased safety awareness, decreased ROM, impaired cardiopulmonary response to activity.     Rehab Prognosis:  Good; patient would benefit from acute skilled OT services to address these deficits and reach maximum level of function.       Plan:     Patient to be seen 2 x/week to address the above listed problems via self-care/home management, therapeutic activities, therapeutic exercises  Plan of Care Expires: 11/21/24  Plan of Care Reviewed with: patient    Subjective     Chief Complaint: "I'm not sitting up in the chair."  Patient/Family Comments/goals: "I surprised y'all with my walking!"  Pain/Comfort:  Pain Rating 1: 0/10    Objective:     Communicated with: Nurse and epic chart review prior to session.  Patient found HOB elevated with peripheral IV, telemetry, bed alarm upon OT entry to room.    General Precautions: Standard, fall    Orthopedic Precautions:N/A  Braces: N/A  Respiratory Status: Room air     Occupational Performance:     Bed Mobility:    Patient completed Rolling/Turning to Left with  stand by assistance  Patient completed Scooting/Bridging with stand by assistance  Patient completed Supine to Sit with stand by assistance  Patient completed Sit to Supine with stand " by assistance     Functional Mobility/Transfers:  Patient completed Sit <> Stand Transfer with contact guard assistance  with  rolling walker   Functional Mobility: Patient completed x60ft functional mobility with Min A and RW to increase dynamic standing balance and activity tolerance needed for ADL completion.   Required increased time to complete.   Stand pivot t/f to EOB with Min A and RW.  Required verbal cueing for safety with RW.     [facility] 6 Click ADL: 16    Treatment & Education:  Pt required max encouragement to participate in OT session today. Educated pt on importance of turning/repositioning in bed for pressure relief to prevent skin breakdown. Reviewed role of OT in acute setting and benefits of participation. Educated on techniques to use to increase independence and decrease fall risk with functional transfers. Educated on importance of OOB activity and calling for A to transfer and meet needs. Encouraged completion of B UE AROM therex throughout the day to tolerance to increase functional strength and activity tolerance. Educated patient on importance of increased tolerance to upright position and direct impact on CV endurance and strength. Patient encouraged to sit up in chair/EOB for a minimum of 2 consecutive hours per day and for all meals. Patient stated understanding and in agreement with POC.     Patient left with bed in chair position with all lines intact, call button in reach, bed alarm on, and nurse notified    GOALS:   Multidisciplinary Problems       Occupational Therapy Goals          Problem: Occupational Therapy    Goal Priority Disciplines Outcome Interventions   Occupational Therapy Goal     OT, PT/OT Progressing    Description: Goals to be met by: 11/16/24     Patient will increase functional independence with ADLs by performing:    Toileting from bedside commode with Minimal Assistance for hygiene and clothing management.   Toilet transfer to bedside commode with Minimal  Assistance.  Increased functional strength in B UE to grossly WFL.                         Time Tracking:     OT Date of Treatment: 11/14/24  OT Start Time: 1040  OT Stop Time: 1105  OT Total Time (min): 25 min    Billable Minutes:Therapeutic Activity 25    OT/ALFREDO: OT      Eloina Álvarez OT     11/14/2024

## 2024-11-15 NOTE — PROGRESS NOTES
[facility] - Telemetry ([facility])  Adult Nutrition  Progress Note    SUMMARY       Recommendations    Recommendation/Intervention:   1. Recommend modify pt's diet to Cardiac diet when medically approrpiate   2. Recommend pt continues Waylon BID for wound healing   3. Updated weight, twice weekly    Goals:   1. Pt will continue to tolerate and consume >75% EEN and EPN prior to RD follow up   2. Pt will tolerate and consume Waylon BID prior to RD follow up  Nutrition Goal Status: goal met, progressing   Communication of RD Recs: other (comment) (POC, sticky note)    Assessment and Plan    Nutrition Problem  Increased protein needs    Related to (etiology):   Increased demand for nutrition     Signs and Symptoms (as evidenced by):   Decubitus ulcers, Loss of skin integrity, Wound healing needs    Interventions/Recommendations (treatment strategy):  1. Amino acids medical food supplement therapy   2. Collaboration by nutrition professional with other providers     Nutrition Diagnosis Status:   Improving       Malnutrition Assessment     Skin (Micronutrient): cracked, edema, wounds unhealed (flaky; Chris score = 16 (mild risk)                                 Reason for Assessment    Reason For Assessment: length of stay  Diagnosis:  (Atrial fibrillation with RVR)  General Information Comments:   11/10/24: 68 y.o. Male admitted for Atrial fibrillation with RVR. PMH: CARINA, Hyperkalemia, Panlobbular emphysema, HFrEF, PAD, COPD, CHF, Stroke, HTN, Arthritis, Liver disease. Pt currently on a Renal diet. H&P noted pt presented to OSH ED d/t weakness x 1 day, pt  transferred for CARINA/hyperkalemia w/ potential need nephrology services along w/ A Fib w/ RVR and hypotension, pt admitted to ICU. EMR noted pt stepped down to floor, CARINA improving, pt asymptomatic but vitals concernering for bradycardia, SNF placement pending. RD added Waylon to pt's d/c orders and trays. Reviewed chart: 100% PO intake; LBM 11/9; Skin: flaky, cracked; Altered  "skin: L lateral heel ulceration, MASD bilateral groin/DTPI L heel partial thickness tissue loss, Abcess lumbar spine, L leg full thickness tissue loss, all clean/dry/intact, details per Wound care note 11/8/24; Chris score: 16 (mild risk); Edema: 1+ trace bilateral foot/leg, generalized. Labs, meds, weight reviewed. Weight charted 10/29/24 200 lbs, 11/9/24 182 lbs (BMI 26.89, Norml for age), -18 lb wt loss (9% wt change) x 10 days, re weigh for accuracy warranted. Unable to perform NFPE d/t RD remote, will perform at follow up if waranted. RD will continue to follow and monitor pt's nutritional status during admit.      Follow up on 11/15/24:   Patient continues on a renal diet with fair to good po intake.  Meal consumption noted at %.  Patient is receiving Waylon BID to promote wound healing.  Weight has remained stable since last RD visit.  Patient is awaiting placement.  Labs reviewed.  NKFA.  LBM: 11/13/2024.  RD recommends to continue with adequate oral intake and Waylon wound healing supplement.  RD to continue to monitor po intake and tolerance at follow up visits.     Nutrition Discharge Planning: Cardiac diet + Waylon BID + Suplena as warranted    Nutrition Risk Screen    Nutrition Risk Screen: large or nonhealing wound, burn or pressure injury    Nutrition Related Social Determinants of Health: SDOH: Adequate food in home environment and None Identified    Nutrition/Diet History    Spiritual, Cultural Beliefs, Holiness Practices, Values that Affect Care: no  Food Allergies: NKFA  Factors Affecting Nutritional Intake: None identified at this time    Anthropometrics    Temp: 97.5 °F (36.4 °C)  Height Method: Stated  Height: 5' 9" (175.3 cm)  Height (inches): 69 in  Weight Method: Bed Scale  Weight: 82.8 kg (182 lb 8.7 oz)  Weight (lb): 182.54 lb  Ideal Body Weight (IBW), Male: 160 lb  % Ideal Body Weight, Male (lb): 113.81 %  BMI (Calculated): 26.9  BMI Grade: 25 - 29.9 - overweight (Normal for age)   " "  Wt Readings from Last 15 Encounters:   11/15/24 82.8 kg (182 lb 8.7 oz)   10/29/24 90.7 kg (200 lb)     Lab/Procedures/Meds    Pertinent Labs Reviewed: reviewed    Pertinent Medications Reviewed: reviewed  Pertinent Medications Comments: senna-docusate, rivaroxaban, Toprol-XL, losartan, famotidine, Jardiance, budesonide, atorvastatin, asprin, arformeterol, amiodarone    BMP  Lab Results   Component Value Date     (L) 11/14/2024    K 4.3 11/14/2024     11/14/2024    CO2 18 (L) 11/14/2024    BUN 41 (H) 11/14/2024    CREATININE 1.3 11/14/2024    CALCIUM 8.9 11/14/2024    ANIONGAP 9 11/14/2024    EGFRNORACEVR >60 11/14/2024     Lab Results   Component Value Date    CALCIUM 8.9 11/14/2024    PHOS 3.1 11/14/2024     Lab Results   Component Value Date    ALBUMIN 3.4 (L) 11/14/2024     Lab Results   Component Value Date    ALT 19 11/14/2024    AST 20 11/14/2024    ALKPHOS 54 11/14/2024    BILITOT 0.3 11/14/2024     No results for input(s): "POCTGLUCOSE" in the last 24 hours.    No results found for: "LABA1C", "HGBA1C"    Lab Results   Component Value Date    WBC 7.30 11/14/2024    HGB 12.6 (L) 11/14/2024    HCT 39.8 (L) 11/14/2024    MCV 88 11/14/2024     11/14/2024       Scheduled Meds:   amiodarone  200 mg Oral Daily    arformoteroL  15 mcg Nebulization BID    aspirin  81 mg Oral Daily    atorvastatin  80 mg Oral QHS    budesonide  0.5 mg Nebulization Q12H    cholecalciferol (vitamin D3)  5,000 Units Oral Daily    empagliflozin  10 mg Oral Daily    famotidine  20 mg Oral BID    folic acid-vit B6-vit B12 2.5-25-2 mg  1 tablet Oral Daily    losartan  12.5 mg Oral Daily    metoprolol succinate  12.5 mg Oral Daily    multivitamin  1 tablet Oral Daily    rivaroxaban  20 mg Oral Daily with dinner    senna-docusate 8.6-50 mg  1 tablet Oral BID    thiamine  100 mg Oral Daily    tuberculin  5 Units Intradermal Once     Continuous Infusions:  PRN Meds:.  Current Facility-Administered Medications:     " acetaminophen, 650 mg, Oral, Q6H PRN    influenza (adjuvanted), 0.5 mL, Intramuscular, Prior to discharge    ondansetron, 8 mg, Intravenous, Q6H PRN    pneumoc 20-haylee conj-dip cr(PF), 0.5 mL, Intramuscular, vaccine x 1 dose      Physical Findings/Assessment         Estimated/Assessed Needs    Weight Used For Calorie Calculations: 82.6 kg (182 lb 1.6 oz)  Energy Calorie Requirements (kcal): 1817 kcals (22 kcals/kg ABW (CHF vs COPD)  Energy Need Method: Kcal/kg  Protein Requirements: 66-83 g (0.8-1.0 g/kg ABW (CARINA, no dialysis vs Pressure injury/Wounds)  Weight Used For Protein Calculations: 82.6 kg (182 lb 1.6 oz)  Fluid Requirements (mL): 500 mL + total output (CARINA)  Estimated Fluid Requirement Method: other (see comments)  RDA Method (mL): 1817  CHO Requirement: 227 g (1817 kcals/8)      Nutrition Prescription Ordered    Current Diet Order: Renal diet  Oral Nutrition Supplement: Waylon BID    Evaluation of Received Nutrient/Fluid Intake  I/O: (Net since admit):  11/10/24: -6458.9 mL  11/15/24: -9259 mL    Energy Calories Required: meeting needs  Protein Required: meeting needs  Fluid Required: meeting needs    Tolerance: tolerating  % Intake of Estimated Energy Needs: 100%  % Meal Intake: 100%    Nutrition Risk    Level of Risk/Frequency of Follow-up: low (F/u x 1 weekly)     Monitor and Evaluation    Food and Nutrient Intake: energy intake, food and beverage intake  Food and Nutrient Adminstration: diet order  Knowledge/Beliefs/Attitudes: food and nutrition knowledge/skill, beliefs and attitudes  Anthropometric Measurements: weight, weight change, body mass index  Biochemical Data, Medical Tests and Procedures: electrolyte and renal panel, inflammatory profile     Nutrition Follow-Up    RD Follow-up?: Yes  Ondina Sheriff, MS, RDN, LDN

## 2024-11-15 NOTE — NURSING
IV removed and pressure dressing applied.   Tele monitor removed and returned to monitor room.  TB skin test administered in LUE. Site marked.

## 2024-11-15 NOTE — PT/OT/SLP PROGRESS
Physical Therapy  Treatment    Layton Abrams   MRN: [id number]   Admitting Diagnosis: Atrial fibrillation with RVR    PT Received On: 11/15/24  PT Start Time: 1015     PT Stop Time: 1040    PT Total Time (min): 25 min       Billable Minutes:  Gait Training 15 and Therapeutic Activity 10    Treatment Type: Treatment  PT/PTA: PTA     Number of PTA visits since last PT visit: 1       General Precautions: Standard, fall  Orthopedic Precautions: N/A  Braces: N/A  Respiratory Status: Room air    Spiritual, Cultural Beliefs, Pentecostal Practices, Values that Affect Care: no    Subjective:  Communicated with patient's nurse, Jana, and completed Epic chart review prior to session.  Patient agreed to PT session with encouragement.     Pain/Comfort  Pain Rating 1: 5/10  Location - Side 1: Bilateral  Location 1: foot  Pain Addressed 1: Other (see comments) (ACTIVITY PACING)  Pain Rating Post-Intervention 1: 5/10    Objective:   Patient found with: peripheral IV, telemetry, bed alarm    Supine > sit EOB: CGA    Forward scoot towards EOB: CGA    Seated EOB x 5 min total focusing on increased tolerance to upright position, core stability, trunk control and CV endurance.   Maintained self supported sitting balance with SPV  Maintained unsupported sitting balance with SPV    STS from EOB > RW: Min A (VC for hand placement)    60ft w/ RW Min A (increased time to complete)    Stand pivot T/F to chair w/ RW: Min A (VC for safety w/ RW mgmt and sequencing of task)    Reviewed AROM TE to BLE including: hip flex/ext, knee flex/ext, ankle PF/DF  To be completed a minimum of 10 reps for each LE in order to promote return of function, strength and ROM.     Educated patient on importance of increased tolerance to upright position and direct impact on CV endurance and strength. Patient encouraged to sit up in chair/ EOB, for a minimum of 2 consecutive hours, 3x per day. Encouraged patient to perform AROM TE to BLE throughout the day within  all available planes of motion. Re enforced importance of utilizing call light to meet needs in room and not attempt to get up without staff assistance. Patient verbalized understanding and agreed to comply.       AM-PAC 6 CLICK MOBILITY  How much help from another person does this patient currently need?   1 = Unable, Total/Dependent Assistance  2 = A lot, Maximum/Moderate Assistance  3 = A little, Minimum/Contact Guard/Supervision  4 = None, Modified [place]/Independent    Turning over in bed (including adjusting bedclothes, sheets and blankets)?: 3  Sitting down on and standing up from a chair with arms (e.g., wheelchair, bedside commode, etc.): 3  Moving from lying on back to sitting on the side of the bed?: 3  Moving to and from a bed to a chair (including a wheelchair)?: 3  Need to walk in hospital room?: 3  Climbing 3-5 steps with a railing?: 1 (NT)  Basic Mobility Total Score: 16    AM-PAC Raw Score CMS G-Code Modifier Level of Impairment Assistance   6 % Total / Unable   7 - 9 CM 80 - 100% Maximal Assist   10 - 14 CL 60 - 80% Moderate Assist   15 - 19 CK 40 - 60% Moderate Assist   20 - 22 CJ 20 - 40% Minimal Assist   23 CI 1-20% SBA / CGA   24 CH 0% Independent/ Mod I     Patient left up in chair with call button in reach, chair alarm on, and aide present.    Assessment:  Layton Abrams is a 68 y.o. male with a medical diagnosis of Atrial fibrillation with RVR and presents with overall decline in functional mobility. Patient would continue to benefit from skilled PT to address functional limitations listed below in order to return to PLOF/decrease caregiver burden.     Rehab identified problem list/impairments: weakness, impaired endurance, impaired self care skills, impaired functional mobility, gait instability, impaired balance, pain, decreased safety awareness, decreased lower extremity function, decreased upper extremity function, decreased coordination, decreased ROM, impaired  cardiopulmonary response to activity    Rehab potential is fair.    Activity tolerance: Fair    Discharge recommendations: Moderate Intensity Therapy      Barriers to discharge:      Equipment recommendations: to be determined by next level of care     GOALS:   Multidisciplinary Problems       Physical Therapy Goals          Problem: Physical Therapy    Goal Priority Disciplines Outcome Interventions   Physical Therapy Goal     PT, PT/OT Progressing    Description: Goals to be met by 11/15/24.  1. Pt will complete bed mobility MIN A.  2. Pt will complete sit to stand MIN A.  3. Pt will ambulate 50ft MIN A using RW.  4. Pt will increase AMPAC score by 2 points to progress functional mobility.                       PLAN:    Patient to be seen 3 x/week to address the above listed problems via gait training, therapeutic activities, therapeutic exercises  Plan of Care expires: 11/15/24  Plan of Care reviewed with: patient         11/15/2024

## 2024-11-15 NOTE — PLAN OF CARE
[facility] - Telemetry (Hospital)  Discharge Final Note    Primary Care Provider: Nellie Cadena MD    Expected Discharge Date: 11/15/2024    Final Discharge Note (most recent)       Final Note - 11/15/24 1312          Final Note    Assessment Type Final Discharge Note     Anticipated Discharge Disposition Skilled Nursing Facility        Post-Acute Status    Post-Acute Authorization Placement     Post-Acute Placement Status Set-up Complete/Auth obtained     Coverage [employer] Managed Medicare     Discharge Delays None known at this time                     Important Message from Medicare  Important Message from Medicare regarding Discharge Appeal Rights: Explained to patient/caregiver     Date IMM was signed: 11/15/24  Time IMM was signed: 0926     Follow-up providers       Nellie Cadena MD   Specialty: Family Medicine   Relationship: PCP - General    [address] [place] [address]  [address]  [facility] [address]   Phone: [phone]       Next Steps: Follow up in 1 month(s)    Instructions: Hospital follow up              After-discharge care                Destination       [facility]   Service: Skilled Nursing    [address] [facility] [address]   Phone: [phone]                             [facility] Disposition: [facility]   Family Notified: Patient's daughter over the phone  Transportation: [facility], wheelchair van    Patient needed SNF placement upon DC. Patient and family decided on [facility] for [facility]. JANENE sent referral for review and insurance approval. Maldonado received approval and gave SW number for nurse report. JANENE messages nurse with number for report.

## 2024-11-15 NOTE — PT/OT/SLP PROGRESS
"Occupational Therapy   Re-Evaluation & Treatment    Name: Layton Abrams  MRN: [id number]  Admitting Diagnosis:  Atrial fibrillation with RVR       Recommendations:     Discharge Recommendations: Moderate Intensity Therapy  Discharge Equipment Recommendations:  to be determined by next level of care  Barriers to discharge:  Decreased caregiver support    Assessment:     Layton Abrams is a 68 y.o. male with a medical diagnosis of Atrial fibrillation with RVR.  He presents with the following performance deficits affecting function are weakness, impaired endurance, impaired self care skills, impaired functional mobility, gait instability, impaired balance, decreased coordination, decreased upper extremity function, decreased lower extremity function, decreased safety awareness, pain, decreased ROM, impaired cardiopulmonary response to activity.     Rehab Prognosis:  Good; patient would benefit from acute skilled OT services to address these deficits and reach maximum level of function.       Plan:     Patient to be seen 2 x/week to address the above listed problems via self-care/home management, therapeutic activities, therapeutic exercises  Plan of Care Expires: 11/29/24  Plan of Care Reviewed with: patient    Subjective     Chief Complaint: BLE pain; "I'm tired."  Patient/Family Comments/goals: get better  Pain/Comfort:  Pain Rating 1: 5/10  Location - Side 1: Bilateral  Location - Orientation 1: generalized  Location 1: leg  Pain Addressed 1: Reposition, Distraction  Pain Rating Post-Intervention 1: 5/10    Objective:     Communicated with: Nurse and epic chart review prior to session.  Patient found supine with peripheral IV, telemetry, bed alarm upon OT entry to room.    General Precautions: Standard, fall    Orthopedic Precautions:N/A  Braces: N/A  Respiratory Status: Room air     Occupational Performance:     Bed Mobility:    Patient completed Rolling/Turning to Left with  contact guard assistance  Patient " completed Scooting/Bridging with contact guard assistance  Patient completed Supine to Sit with contact guard assistance   Required increased time to complete all bed mobility.     Functional Mobility/Transfers:  Patient completed Sit <> Stand Transfer with minimum assistance  with  rolling walker   Patient completed Bed <> Chair Transfer using Stand Pivot technique with minimum assistance with rolling walker  Functional Mobility: Patient completed x60ft functional mobility with Min A and RW to increase dynamic standing balance and activity tolerance needed for ADL completion.   Required increased time to complete.    Activities of Daily Living:  Grooming: setup A. Brushed hair and washed face while in chair  Toileting: setup A. Used urinal while sitting EOB    Cognitive/Visual Perceptual:  Oriented to: Person, Place, Time, Situation  Follows Commands/attention: Follows two-step commands    Physical Exam:  Balance:    -     Sitting: Supervision  -     Standing: contact guard assist  Upper Extremity Range of Motion:     -       Right Upper Extremity: WFL  -       Left Upper Extremity: WFL  Upper Extremity Strength:    -       Right Upper Extremity: Deficits: grossly 4+/5  -       Left Upper Extremity: Deficits: grossly 4+/5   Strength:    -       Right Upper Extremity: WFL  -       Left Upper Extremity: WFL    [facility] 6 Click ADL: 18    Treatment & Education:  Pt required max encouragement to participate in OT session. Educated pt on importance of turning/repositioning in bed for pressure relief to prevent skin breakdown. Reviewed role of OT in acute setting and benefits of participation. Educated on techniques to use to increase independence and decrease fall risk with functional transfers. Educated on importance of OOB activity and calling for A to transfer and meet needs. Encouraged completion of B UE AROM therex throughout the day to tolerance to increase functional strength and activity tolerance. Educated  patient on importance of increased tolerance to upright position and direct impact on CV endurance and strength. Patient encouraged to sit up in chair/EOB for a minimum of 2 consecutive hours per day and for all meals. Patient stated understanding and in agreement with POC.     Patient left up in chair with all lines intact, call button in reach, and chair alarm on    GOALS:   Multidisciplinary Problems       Occupational Therapy Goals          Problem: Occupational Therapy    Goal Priority Disciplines Outcome Interventions   Occupational Therapy Goal     OT, PT/OT Progressing    Description: Goals to be met by: 11/29/24     Patient will increase functional independence with ADLs by performing:    Toileting from bedside commode with Supervision for hygiene and clothing management.   Toilet transfer to bedside commode with SBA using RW.  Increased functional strength in B UE to grossly WFL.                         Time Tracking:     OT Date of Treatment: 11/15/24  OT Start Time: 1015  OT Stop Time: 1040  OT Total Time (min): 25 min    Billable Minutes:Re-eval 15  Therapeutic Activity 10    OT/ALFREDO: OT      Eloina Álvarez OT     11/15/2024

## 2024-11-15 NOTE — PLAN OF CARE
11/15/24 0834   Post-Acute Status   Post-Acute Authorization Placement  (SNF)   Post-Acute Placement Status Set-up Complete/Auth obtained   Coverage [employer] Managed Medicare   Discharge Delays None known at this time   Discharge Plan   Discharge Plan A Skilled Nursing Facility       Per Maldonado, with [facility], Patient's SNF denial was over turned and Patient has authorization to admit to [facility]. Maldonado provided SW of list of information needed and JANENE inquired about if they have the [employer]. Maldonado stated that she has not received anything from [facility] in [place]. JANENE requested fax number to fax information to Maldonado. Maldonado provided fax number [phone].   JANENE sent information requested, that has resulted now and will fax additional information once it comes back and results.     9 54 JANENE emailed DC orders and AVS to Maldonado, with [facility] for reivew.    12 56 JANENE sent COVID Test results, TB read order ,and MAR for Maldonado, with [facility] to review. JANENE pending number for nurse report and  time.     JANENE will continue to follow and assist as needed.

## 2024-11-15 NOTE — PLAN OF CARE
11/15/24 0954   Medicare Message   Important Message from Medicare regarding Discharge Appeal Rights Explained to patient/caregiver   Date IMM was signed 11/15/24   Time IMM was signed 0926       SW spoke to Patient's daughter, Monica, to go over IMM.  Patient's daughter verbalized understanding and was in agreement to DC today.

## 2024-11-15 NOTE — PLAN OF CARE
OT re-eval completed. Continue OT POC.  CGA for bed mobility. Min A for t/fs and ambulation 60ft with RW.

## 2024-11-15 NOTE — PROGRESS NOTES
Pharmacist Renal Dose Adjustment Note    Layton Abrams is a 68 y.o. male being treated with the medication famotidine.     Patient Data:    Vital Signs (Most Recent):  Temp: 98.1 °F (36.7 °C) (11/14/24 1732)  Pulse: (!) 51 (11/14/24 1732)  Resp: 18 (11/14/24 1732)  BP: 120/60 (11/14/24 1732)  SpO2: 97 % (11/14/24 1732) Vital Signs (72h Range):  Temp:  [97.3 °F (36.3 °C)-98.9 °F (37.2 °C)]   Pulse:  [50-78]   Resp:  [16-20]   BP: ()/(52-77)   SpO2:  [93 %-100 %]      Recent Labs   Lab 11/12/24  0429 11/13/24  0432 11/14/24  0508   CREATININE 1.5* 1.5* 1.3     Serum creatinine: 1.3 mg/dL 11/14/24 0508  Estimated creatinine clearance: 55.1 mL/min     Medication: famotidine 20 mg PO daily will be changed to famotidine 20 mg PO BID per pharmacy renal dose adjustment protocol for patients with CrCl greater than 50 mL/min.    Pharmacist's Name: Kori Beyer PharmD  Pharmacist's Extension: [phone]     Thank you for allowing us to participate in this patient's care.     Kori Beyer PharmD 11/14/2024 6:58 PM

## 2024-11-15 NOTE — PLAN OF CARE
A232/A232 SANTOS Abrams is a 68 y.o.male admitted on 10/29/2024 for Atrial fibrillation with RVR   Code Status: Full Code MRN: [id number]   Review of patient's allergies indicates:  No Known Allergies  Past Medical History:   Diagnosis Date    Arthritis     CHF (congestive heart failure)     COPD (chronic obstructive pulmonary disease)     Hypertension     Liver disease     Stroke       PRN meds    acetaminophen, 650 mg, Q6H PRN  influenza (adjuvanted), 0.5 mL, Prior to discharge  ondansetron, 8 mg, Q6H PRN  pneumoc 20-haylee conj-dip cr(PF), 0.5 mL, vaccine x 1 dose           Pt oriented x4.  VSS.  Pt remained afebrile throughout this shift.   All meds administered per order.   Pt remained free of falls this shift.   Plan of care reviewed. Patient verbalizes understanding.   Pt moving/turning independently.   Bed low, side rails up x 2, wheels locked, call light in reach.   Patient instructed to call for assistance.  Patient education provided      IV removed, cardiac monitor returned. Report given. AVS given.              Orientation: oriented x 4  Rashad Coma Scale Score: 15     Lead Monitored: Lead II Rhythm: sinus arrhythmia Frequency/Ectopy: PVCs  Cardiac/Telemetry Box Number: [id number]  VTE Core Measure: Pharmacological prophylaxis initiated/maintained Last Bowel Movement: 11/13/24  Diet Renal  Diet Cardiac  Diet diabetic  Voiding Characteristics: voids spontaneously without difficulty  Chris Score: 17  Fall Risk Score: 11  Accucheck []   Freq?      Lines/Drains/Airways       None

## 2024-11-16 NOTE — DISCHARGE SUMMARY
[facility] - Telemetry ([facility] Medicine  Discharge Summary      Patient Name: Layton Abrams  MRN: [id number]  [facility]: [id number]  Patient Class: IP- Inpatient  Admission Date: 10/29/2024  Hospital Length of Stay: 17 days  Discharge Date and Time: 11/15/2024  2:44 PM  Attending Physician: Mary att. providers found   Discharging Provider: Josefina Jeter MD  Primary Care Provider: Nellie Cadena MD    Primary Care Team: [facility] MEDICINE C    HPI:   The patient is 68-year-old male with a history of atrial fibrillation, coronary artery disease with stenting, hypertension, stroke with right-sided deficits, COPD, HCV, left peroneal DVT, Xarelto use, and lower extremity cellulitis presented to [facility] Emergency Department on October 29 with weakness that began on the morning of presentation.  By report he had been feeling well the day prior but did not feel well on October 29.  In the emergency department he had AFib with RVR and hypotension, but oxygenation was stable on room air.  Patient was drowsy but would wake and speak without difficulty.  He reported no chest pain or shortness of breath.  Labs were concerning for CARINA with  and creatinine 5.38.  Potassium was 7.  High sensitivity troponin was in the normal range at 40.  Chest x-ray had clear lungs but did show marked cardiomegaly.  ED attempted cardioversion with the AFib with RVR, but the patient did not cardiovert.  By report, EKGs did not show evidence of significant peak T-waves. He received calcium, dextrose/insulin, lactated Ringer's, IV metoprolol, IV bicarbonate, and Lokelma.  Levophed infusion was initiated.  Blood pressure improved with the Levophed, but tachycardia persisted.  Patient has a PICC line in place.  He also had a Wilcox catheter placed and had a small amount of urine with catheter placement.  Case discussed with the ED doctor and with Critical Care Medicine at [facility].  Patient transfer to  [facility] and admitted to the ICU. He was placed on bicarb and amiodarone infusions.        * No surgery found *      Hospital Course:   The patient presented with weakness that began on the morning of presentation. By report he had been feeling well the day prior but did not feel well on October 29. In the emergency department he had AFib with RVR and hypotension, but oxygenation was stable on room air. Patient was drowsy but would wake and speak without difficulty. He reported no chest pain or shortness of breath. Labs were concerning for CARINA with  and creatinine 5.38. Potassium was 7. High sensitivity troponin was in the normal range at 40. Chest x-ray had clear lungs but did show marked cardiomegaly. ED attempted cardioversion with the AFib with RVR, but the patient did not cardiovert. By report, EKGs did not show evidence of significant peak T-waves. He received calcium, dextrose/insulin, lactated Ringer's, IV metoprolol, IV bicarbonate, and Lokelma. Levophed infusion was initiated. Blood pressure improved with the Levophed, but tachycardia persisted. Patient has a PICC line in place. He also had a Wilcox catheter placed and had a small amount of urine with catheter placement. Case discussed with the ED doctor and with Critical Care Medicine at [facility]. Patient transfer to [facility] and admitted to the ICU. He was placed on bicarb and amiodarone infusions. He was weaned off of levophed.  His renal function improved. Arterial US revealed PAD and occluded sfa. Vascular surgery was consulted, and evaluation noted plans for outpatient follow up with left leg angiogram for possible intervention and right leg intervention in the near future as well.  Plan to follow up in 2-3 weeks after medical stabilization.  Vitals concerning for bradycardia at rest, but patient continues to remain asymptomatic.  Metoprolol dosage decreased.  Pending SNF placement    11/13- resting comfortably  in bed after he participated with PT/OT today. VSS afeb, appears weak and frail. SW arranging for sending him to SNF but Insurance denies. Labs stable, H/H 12.7/40, Porfirio/Cr 34/1.5- cont present care.     11/14- comfortable in bed, on RA, VSS Afeb, AAox3, speech clear. Better participation in PT/OT. Also getting wound care for the heels. SW working on SNF placement, appealing the denial to the insurance.     11/15- resting comfortably in bed on RA, AAOx4, eating drinking well, doing well with PT/OT. He has been accepted at [facility] and is ready to go. He was seen and examined and deemed stable for discharge to the SNF today.      Goals of Care Treatment Preferences:  Code Status: Full Code      SDOH Screening:  The patient was screened for utility difficulties, food insecurity, transport difficulties, housing insecurity, and interpersonal safety and there were no concerns identified this admission.     Consults:   Consults (From admission, onward)          Status Ordering Provider     Inpatient consult to Vascular Surgery  Once        Provider:  Emily Leavitt MD    Completed LORENZO HO.     Inpatient consult to Cardiology  Once        Provider:  Yosef Ocasio MD    Completed REMY CASEY.     Inpatient consult to Nephrology  Once        Provider:  Justin Maradiaga MD    Completed REMY CASEY.            No new Assessment & Plan notes have been filed under this hospital service since the last note was generated.  Service: Hospital Medicine    Final Active Diagnoses:    Diagnosis Date Noted POA    PAD (peripheral artery disease) [I73.9] 11/03/2024 Yes    Panlobular emphysema [J43.1] 10/29/2024 Yes    HFrEF (heart failure with reduced ejection fraction) [I50.20] 10/29/2024 Yes      Problems Resolved During this Admission:    Diagnosis Date Noted Date Resolved POA    PRINCIPAL PROBLEM:  Atrial fibrillation with RVR [I48.91] 10/29/2024 11/15/2024 Yes    CARINA (acute kidney injury) [N17.9]  "10/29/2024 11/15/2024 Yes    Hyperkalemia [E87.5] 10/29/2024 11/14/2024 Yes    Hypotension [I95.9] 10/29/2024 11/04/2024 Yes       Discharged Condition: stable    Disposition: Skilled Nursing Facility    Follow Up:   Contact information for follow-up providers       Nellie Cadena MD Follow up in 1 month(s).    Specialty: Family Medicine  Why: Hospital follow up  Contact information:  [address]  [address]  [place] [id number] [phone]                       Contact information for after-discharge care       Destination       [facility] .    Service: Skilled Nursing  Contact information:  [address] [address] [phone]                                 Patient Instructions:      Ambulatory referral/consult to Vascular Surgery   Standing Status: Future   Referral Priority: Routine Referral Type: Consultation   Referral Reason: Specialty Services Required   Requested Specialty: Vascular Surgery   Number of Visits Requested: 1     Diet Cardiac     Diet diabetic     Activity as tolerated       Significant Diagnostic Studies: Labs: All labs within the past 24 hours have been reviewed  Microbiology: Blood Culture   Lab Results   Component Value Date    LABBLOO No growth after 5 days. 10/30/2024    and Urine Culture  No results found for: "LABURIN"  Radiology:      Pending Diagnostic Studies:       None           Medications:  Reconciled Home Medications:      Medication List        START taking these medications      amiodarone 200 MG Tab  Commonly known as: PACERONE  Take 1 tablet (200 mg total) by mouth once daily.     aspirin 81 MG EC tablet  Commonly known as: ECOTRIN  Take 1 tablet (81 mg total) by mouth once daily.     atorvastatin 80 MG tablet  Commonly known as: LIPITOR  Take 1 tablet (80 mg total) by mouth every evening.     cholecalciferol (vitamin D3) 125 mcg (5,000 unit) Tab  Take 1 tablet (5,000 Units total) by mouth once daily.   "   empagliflozin 10 mg tablet  Commonly known as: Jardiance  Take 1 tablet (10 mg total) by mouth once daily.     famotidine 20 MG tablet  Commonly known as: PEPCID  Take 1 tablet (20 mg total) by mouth 2 (two) times daily.     folic acid-vit B6-vit B12 2.5-25-2 mg 2.5-25-2 mg Tab  Commonly known as: FOLBIC or Equiv  Take 1 tablet by mouth once daily.     losartan 25 MG tablet  Commonly known as: COZAAR  Take 0.5 tablets (12.5 mg total) by mouth once daily.     metoprolol succinate 25 MG 24 hr tablet  Commonly known as: TOPROL-XL  Take 0.5 tablets (12.5 mg total) by mouth once daily.     multivitamin Tab  Take 1 tablet by mouth once daily.     rivaroxaban 20 mg Tab  Commonly known as: XARELTO  Take 1 tablet (20 mg total) by mouth daily with dinner or evening meal.     senna-docusate 8.6-50 mg 8.6-50 mg per tablet  Commonly known as: PERICOLACE  Take 1 tablet by mouth 2 (two) times daily.     thiamine 100 MG tablet  Take 1 tablet (100 mg total) by mouth once daily.              Indwelling Lines/Drains at time of discharge:   Lines/Drains/Airways       None                   Time spent on the discharge of patient: 37 minutes         Josefina Jeter MD  Department of Hospital Medicine  '[place] - Telemetry ([facility])